# Patient Record
Sex: MALE | Race: WHITE | NOT HISPANIC OR LATINO | Employment: OTHER | URBAN - METROPOLITAN AREA
[De-identification: names, ages, dates, MRNs, and addresses within clinical notes are randomized per-mention and may not be internally consistent; named-entity substitution may affect disease eponyms.]

---

## 2017-01-04 ENCOUNTER — HOSPITAL ENCOUNTER (OUTPATIENT)
Dept: LAB | Facility: MEDICAL CENTER | Age: 72
End: 2017-01-04
Attending: FAMILY MEDICINE
Payer: MEDICARE

## 2017-01-04 LAB
INR PPP: 1.13 (ref 0.87–1.13)
PROTHROMBIN TIME: 14.9 SEC (ref 12–14.6)

## 2017-01-04 PROCEDURE — 36415 COLL VENOUS BLD VENIPUNCTURE: CPT

## 2017-01-04 PROCEDURE — 85610 PROTHROMBIN TIME: CPT

## 2017-01-18 ENCOUNTER — HOSPITAL ENCOUNTER (OUTPATIENT)
Dept: LAB | Facility: MEDICAL CENTER | Age: 72
End: 2017-01-18
Attending: FAMILY MEDICINE
Payer: MEDICARE

## 2017-01-18 LAB
ALBUMIN SERPL BCP-MCNC: 4 G/DL (ref 3.2–4.9)
ALBUMIN/GLOB SERPL: 1.1 G/DL
ALP SERPL-CCNC: 111 U/L (ref 30–99)
ALT SERPL-CCNC: 11 U/L (ref 2–50)
ANION GAP SERPL CALC-SCNC: 7 MMOL/L (ref 0–11.9)
APPEARANCE UR: CLEAR
AST SERPL-CCNC: 17 U/L (ref 12–45)
BASOPHILS # BLD AUTO: 0.04 K/UL (ref 0–0.12)
BASOPHILS NFR BLD AUTO: 0.7 % (ref 0–1.8)
BILIRUB SERPL-MCNC: 0.4 MG/DL (ref 0.1–1.5)
BILIRUB UR QL STRIP.AUTO: NEGATIVE
BUN SERPL-MCNC: 14 MG/DL (ref 8–22)
CALCIUM SERPL-MCNC: 9.3 MG/DL (ref 8.5–10.5)
CHLORIDE SERPL-SCNC: 102 MMOL/L (ref 96–112)
CHOLEST SERPL-MCNC: 140 MG/DL (ref 100–199)
CO2 SERPL-SCNC: 26 MMOL/L (ref 20–33)
COLOR UR AUTO: COLORLESS
CREAT SERPL-MCNC: 0.68 MG/DL (ref 0.5–1.4)
EOSINOPHIL # BLD: 0.3 K/UL (ref 0–0.51)
EOSINOPHIL NFR BLD AUTO: 5 % (ref 0–6.9)
ERYTHROCYTE [DISTWIDTH] IN BLOOD BY AUTOMATED COUNT: 49.8 FL (ref 35.9–50)
FERRITIN SERPL-MCNC: 121.6 NG/ML (ref 22–322)
GLOBULIN SER CALC-MCNC: 3.8 G/DL (ref 1.9–3.5)
GLUCOSE SERPL-MCNC: 87 MG/DL (ref 65–99)
GLUCOSE UR STRIP.AUTO-MCNC: NEGATIVE MG/DL
HCT VFR BLD AUTO: 38.9 % (ref 42–52)
HDLC SERPL-MCNC: 79 MG/DL
HGB BLD-MCNC: 12.5 G/DL (ref 14–18)
IMM GRANULOCYTES # BLD AUTO: 0.02 K/UL (ref 0–0.11)
IMM GRANULOCYTES NFR BLD AUTO: 0.3 % (ref 0–0.9)
INR PPP: 2.07 (ref 0.87–1.13)
IRON SATN MFR SERPL: 10 % (ref 15–55)
IRON SERPL-MCNC: 28 UG/DL (ref 50–180)
KETONES UR STRIP.AUTO-MCNC: NEGATIVE MG/DL
LDLC SERPL CALC-MCNC: 49 MG/DL
LEUKOCYTE ESTERASE UR QL STRIP.AUTO: NEGATIVE
LYMPHOCYTES # BLD: 1.62 K/UL (ref 1–4.8)
LYMPHOCYTES NFR BLD AUTO: 27 % (ref 22–41)
MCH RBC QN AUTO: 30.3 PG (ref 27–33)
MCHC RBC AUTO-ENTMCNC: 32.1 G/DL (ref 33.7–35.3)
MCV RBC AUTO: 94.4 FL (ref 81.4–97.8)
MICRO URNS: NORMAL
MONOCYTES # BLD: 0.7 K/UL (ref 0–0.85)
MONOCYTES NFR BLD AUTO: 11.7 % (ref 0–13.4)
NEUTROPHILS # BLD: 3.32 K/UL (ref 1.82–7.42)
NEUTROPHILS NFR BLD AUTO: 55.3 % (ref 44–72)
NITRITE UR QL STRIP.AUTO: NEGATIVE
NRBC # BLD AUTO: 0 K/UL
NRBC BLD-RTO: 0 /100 WBC
PH UR: 6 [PH]
PLATELET # BLD AUTO: 366 K/UL (ref 164–446)
PMV BLD AUTO: 8.6 FL (ref 9–12.9)
POTASSIUM SERPL-SCNC: 4.5 MMOL/L (ref 3.6–5.5)
PROT SERPL-MCNC: 7.8 G/DL (ref 6–8.2)
PROT UR QL STRIP: NEGATIVE MG/DL
PROTHROMBIN TIME: 23.9 SEC (ref 12–14.6)
PSA SERPL DL<=0.01 NG/ML-MCNC: 0.64 NG/ML (ref 0–4)
RBC # BLD AUTO: 4.12 M/UL (ref 4.7–6.1)
RBC UR QL AUTO: NEGATIVE
SODIUM SERPL-SCNC: 135 MMOL/L (ref 135–145)
SP GR UR STRIP.AUTO: 1
TIBC SERPL-MCNC: 274 UG/DL (ref 250–450)
TRIGL SERPL-MCNC: 59 MG/DL (ref 0–149)
VIT B12 SERPL-MCNC: 172 PG/ML (ref 211–911)
WBC # BLD AUTO: 6 K/UL (ref 4.8–10.8)

## 2017-01-18 PROCEDURE — 84153 ASSAY OF PSA TOTAL: CPT | Mod: GA

## 2017-01-18 PROCEDURE — 80061 LIPID PANEL: CPT | Mod: GA

## 2017-01-18 PROCEDURE — 83550 IRON BINDING TEST: CPT

## 2017-01-18 PROCEDURE — 85610 PROTHROMBIN TIME: CPT

## 2017-01-18 PROCEDURE — 80053 COMPREHEN METABOLIC PANEL: CPT

## 2017-01-18 PROCEDURE — 81003 URINALYSIS AUTO W/O SCOPE: CPT

## 2017-01-18 PROCEDURE — 82607 VITAMIN B-12: CPT

## 2017-01-18 PROCEDURE — 83540 ASSAY OF IRON: CPT

## 2017-01-18 PROCEDURE — 36415 COLL VENOUS BLD VENIPUNCTURE: CPT

## 2017-01-18 PROCEDURE — 85025 COMPLETE CBC W/AUTO DIFF WBC: CPT

## 2017-01-18 PROCEDURE — 82728 ASSAY OF FERRITIN: CPT

## 2017-04-05 ENCOUNTER — HOSPITAL ENCOUNTER (OUTPATIENT)
Dept: HOSPITAL 8 - CVU | Age: 72
End: 2017-04-05
Attending: INTERNAL MEDICINE
Payer: MEDICARE

## 2017-04-05 DIAGNOSIS — I35.0: ICD-10-CM

## 2017-04-05 DIAGNOSIS — I82.813: Primary | ICD-10-CM

## 2017-04-05 PROCEDURE — 93922 UPR/L XTREMITY ART 2 LEVELS: CPT

## 2017-04-13 ENCOUNTER — HOSPITAL ENCOUNTER (OUTPATIENT)
Facility: MEDICAL CENTER | Age: 72
End: 2017-04-13
Attending: FAMILY MEDICINE
Payer: MEDICARE

## 2017-04-13 PROCEDURE — 87070 CULTURE OTHR SPECIMN AEROBIC: CPT

## 2017-04-16 LAB
BACTERIA WND AEROBE CULT: NORMAL
SIGNIFICANT IND 70042: NORMAL
SITE SITE: NORMAL
SOURCE SOURCE: NORMAL

## 2017-04-17 ENCOUNTER — HOSPITAL ENCOUNTER (OUTPATIENT)
Dept: LAB | Facility: MEDICAL CENTER | Age: 72
End: 2017-04-17
Attending: FAMILY MEDICINE
Payer: MEDICARE

## 2017-04-17 LAB
BASOPHILS # BLD AUTO: 0.7 % (ref 0–1.8)
BASOPHILS # BLD: 0.05 K/UL (ref 0–0.12)
EOSINOPHIL # BLD AUTO: 0.29 K/UL (ref 0–0.51)
EOSINOPHIL NFR BLD: 3.9 % (ref 0–6.9)
ERYTHROCYTE [DISTWIDTH] IN BLOOD BY AUTOMATED COUNT: 53 FL (ref 35.9–50)
FERRITIN SERPL-MCNC: 132.5 NG/ML (ref 22–322)
HCT VFR BLD AUTO: 38.8 % (ref 42–52)
HGB BLD-MCNC: 12.5 G/DL (ref 14–18)
IMM GRANULOCYTES # BLD AUTO: 0.03 K/UL (ref 0–0.11)
IMM GRANULOCYTES NFR BLD AUTO: 0.4 % (ref 0–0.9)
INR PPP: 2.53 (ref 0.87–1.13)
IRON SATN MFR SERPL: 11 % (ref 15–55)
IRON SERPL-MCNC: 27 UG/DL (ref 50–180)
LYMPHOCYTES # BLD AUTO: 1.33 K/UL (ref 1–4.8)
LYMPHOCYTES NFR BLD: 17.7 % (ref 22–41)
MCH RBC QN AUTO: 30.9 PG (ref 27–33)
MCHC RBC AUTO-ENTMCNC: 32.2 G/DL (ref 33.7–35.3)
MCV RBC AUTO: 96 FL (ref 81.4–97.8)
MONOCYTES # BLD AUTO: 0.71 K/UL (ref 0–0.85)
MONOCYTES NFR BLD AUTO: 9.4 % (ref 0–13.4)
NEUTROPHILS # BLD AUTO: 5.11 K/UL (ref 1.82–7.42)
NEUTROPHILS NFR BLD: 67.9 % (ref 44–72)
NRBC # BLD AUTO: 0 K/UL
NRBC BLD AUTO-RTO: 0 /100 WBC
PLATELET # BLD AUTO: 324 K/UL (ref 164–446)
PMV BLD AUTO: 9 FL (ref 9–12.9)
PROTHROMBIN TIME: 28 SEC (ref 12–14.6)
RBC # BLD AUTO: 4.04 M/UL (ref 4.7–6.1)
TIBC SERPL-MCNC: 256 UG/DL (ref 250–450)
VIT B12 SERPL-MCNC: 529 PG/ML (ref 211–911)
WBC # BLD AUTO: 7.5 K/UL (ref 4.8–10.8)

## 2017-04-17 PROCEDURE — 36415 COLL VENOUS BLD VENIPUNCTURE: CPT

## 2017-04-17 PROCEDURE — 85610 PROTHROMBIN TIME: CPT

## 2017-05-15 ENCOUNTER — HOSPITAL ENCOUNTER (OUTPATIENT)
Dept: LAB | Facility: MEDICAL CENTER | Age: 72
End: 2017-05-15
Attending: FAMILY MEDICINE
Payer: MEDICARE

## 2017-05-15 LAB
INR PPP: 1.19 (ref 0.87–1.13)
PROTHROMBIN TIME: 15.5 SEC (ref 12–14.6)

## 2017-05-15 PROCEDURE — 85610 PROTHROMBIN TIME: CPT

## 2017-05-15 PROCEDURE — 36415 COLL VENOUS BLD VENIPUNCTURE: CPT

## 2017-06-05 ENCOUNTER — HOSPITAL ENCOUNTER (OUTPATIENT)
Dept: LAB | Facility: MEDICAL CENTER | Age: 72
End: 2017-06-05
Attending: FAMILY MEDICINE
Payer: MEDICARE

## 2017-06-05 LAB
INR PPP: 2.46 (ref 0.87–1.13)
PROTHROMBIN TIME: 27.4 SEC (ref 12–14.6)

## 2017-06-05 PROCEDURE — 36415 COLL VENOUS BLD VENIPUNCTURE: CPT

## 2017-06-05 PROCEDURE — 85610 PROTHROMBIN TIME: CPT

## 2017-06-13 ENCOUNTER — HOSPITAL ENCOUNTER (OUTPATIENT)
Dept: LAB | Facility: MEDICAL CENTER | Age: 72
End: 2017-06-13
Attending: FAMILY MEDICINE
Payer: MEDICARE

## 2017-06-13 LAB
INR PPP: 2.03 (ref 0.87–1.13)
PROTHROMBIN TIME: 23.6 SEC (ref 12–14.6)

## 2017-06-13 PROCEDURE — 36415 COLL VENOUS BLD VENIPUNCTURE: CPT

## 2017-06-13 PROCEDURE — 85610 PROTHROMBIN TIME: CPT

## 2017-07-10 ENCOUNTER — HOSPITAL ENCOUNTER (INPATIENT)
Dept: HOSPITAL 8 - ORIP | Age: 72
LOS: 8 days | Discharge: NURSING, REHAB OR LONG-TERM CARE | DRG: 468 | End: 2017-07-18
Attending: ORTHOPAEDIC SURGERY | Admitting: ORTHOPAEDIC SURGERY
Payer: MEDICARE

## 2017-07-10 VITALS — DIASTOLIC BLOOD PRESSURE: 70 MMHG | SYSTOLIC BLOOD PRESSURE: 116 MMHG

## 2017-07-10 VITALS — WEIGHT: 195.55 LBS | BODY MASS INDEX: 25.92 KG/M2 | HEIGHT: 73 IN

## 2017-07-10 VITALS — SYSTOLIC BLOOD PRESSURE: 130 MMHG | DIASTOLIC BLOOD PRESSURE: 80 MMHG

## 2017-07-10 DIAGNOSIS — B95.7: ICD-10-CM

## 2017-07-10 DIAGNOSIS — I05.0: ICD-10-CM

## 2017-07-10 DIAGNOSIS — Z88.6: ICD-10-CM

## 2017-07-10 DIAGNOSIS — I10: ICD-10-CM

## 2017-07-10 DIAGNOSIS — Z86.718: ICD-10-CM

## 2017-07-10 DIAGNOSIS — T84.54XA: Primary | ICD-10-CM

## 2017-07-10 DIAGNOSIS — Z86.14: ICD-10-CM

## 2017-07-10 DIAGNOSIS — B96.89: ICD-10-CM

## 2017-07-10 DIAGNOSIS — J44.9: ICD-10-CM

## 2017-07-10 DIAGNOSIS — I35.0: ICD-10-CM
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
## 2017-07-10 PROCEDURE — 87176 TISSUE HOMOGENIZATION CULTR: CPT

## 2017-07-10 PROCEDURE — 87077 CULTURE AEROBIC IDENTIFY: CPT

## 2017-07-10 PROCEDURE — 77001 FLUOROGUIDE FOR VEIN DEVICE: CPT

## 2017-07-10 PROCEDURE — 86140 C-REACTIVE PROTEIN: CPT

## 2017-07-10 PROCEDURE — 87070 CULTURE OTHR SPECIMN AEROBIC: CPT

## 2017-07-10 PROCEDURE — 36415 COLL VENOUS BLD VENIPUNCTURE: CPT

## 2017-07-10 PROCEDURE — C1751 CATH, INF, PER/CENT/MIDLINE: HCPCS

## 2017-07-10 PROCEDURE — 80048 BASIC METABOLIC PNL TOTAL CA: CPT

## 2017-07-10 PROCEDURE — 85014 HEMATOCRIT: CPT

## 2017-07-10 PROCEDURE — 0SPD0JZ REMOVAL OF SYNTHETIC SUBSTITUTE FROM LEFT KNEE JOINT, OPEN APPROACH: ICD-10-PCS | Performed by: ORTHOPAEDIC SURGERY

## 2017-07-10 PROCEDURE — 87040 BLOOD CULTURE FOR BACTERIA: CPT

## 2017-07-10 PROCEDURE — 87075 CULTR BACTERIA EXCEPT BLOOD: CPT

## 2017-07-10 PROCEDURE — 85025 COMPLETE CBC W/AUTO DIFF WBC: CPT

## 2017-07-10 PROCEDURE — 85730 THROMBOPLASTIN TIME PARTIAL: CPT

## 2017-07-10 PROCEDURE — 85018 HEMOGLOBIN: CPT

## 2017-07-10 PROCEDURE — C1713 ANCHOR/SCREW BN/BN,TIS/BN: HCPCS

## 2017-07-10 PROCEDURE — 0SRD0J9 REPLACEMENT OF LEFT KNEE JOINT WITH SYNTHETIC SUBSTITUTE, CEMENTED, OPEN APPROACH: ICD-10-PCS | Performed by: ORTHOPAEDIC SURGERY

## 2017-07-10 PROCEDURE — 85610 PROTHROMBIN TIME: CPT

## 2017-07-10 PROCEDURE — 82550 ASSAY OF CK (CPK): CPT

## 2017-07-10 PROCEDURE — 87205 SMEAR GRAM STAIN: CPT

## 2017-07-10 PROCEDURE — 87186 SC STD MICRODIL/AGAR DIL: CPT

## 2017-07-10 PROCEDURE — 80202 ASSAY OF VANCOMYCIN: CPT

## 2017-07-10 PROCEDURE — 82962 GLUCOSE BLOOD TEST: CPT

## 2017-07-10 PROCEDURE — 76937 US GUIDE VASCULAR ACCESS: CPT

## 2017-07-10 PROCEDURE — 80053 COMPREHEN METABOLIC PANEL: CPT

## 2017-07-10 PROCEDURE — 85651 RBC SED RATE NONAUTOMATED: CPT

## 2017-07-10 PROCEDURE — 36569 INSJ PICC 5 YR+ W/O IMAGING: CPT

## 2017-07-10 RX ADMIN — OXYCODONE HYDROCHLORIDE PRN MG: 5 TABLET ORAL at 22:41

## 2017-07-10 RX ADMIN — MORPHINE SULFATE PRN MG: 10 INJECTION INTRAVENOUS at 22:15

## 2017-07-10 RX ADMIN — DIAZEPAM PRN MG: 5 TABLET ORAL at 22:41

## 2017-07-10 RX ADMIN — FENTANYL CITRATE PRN MCG: 50 INJECTION INTRAMUSCULAR; INTRAVENOUS at 20:21

## 2017-07-10 RX ADMIN — FENTANYL CITRATE PRN MCG: 50 INJECTION INTRAMUSCULAR; INTRAVENOUS at 20:59

## 2017-07-10 RX ADMIN — INSULIN LISPRO SCH NOTE: 100 INJECTION, SOLUTION INTRAVENOUS; SUBCUTANEOUS at 23:30

## 2017-07-10 RX ADMIN — SCOPOLAMINE SCH PATCH: 1 PATCH, EXTENDED RELEASE TRANSDERMAL at 22:32

## 2017-07-10 RX ADMIN — FENTANYL CITRATE PRN MCG: 50 INJECTION INTRAMUSCULAR; INTRAVENOUS at 21:16

## 2017-07-10 RX ADMIN — DOCUSATE SODIUM SCH MG: 100 CAPSULE, LIQUID FILLED ORAL at 21:00

## 2017-07-10 RX ADMIN — FENTANYL CITRATE PRN MCG: 50 INJECTION INTRAMUSCULAR; INTRAVENOUS at 20:26

## 2017-07-10 RX ADMIN — ACETAMINOPHEN SCH MG: 160 SOLUTION ORAL at 22:30

## 2017-07-11 VITALS — SYSTOLIC BLOOD PRESSURE: 99 MMHG | DIASTOLIC BLOOD PRESSURE: 61 MMHG

## 2017-07-11 VITALS — DIASTOLIC BLOOD PRESSURE: 80 MMHG | SYSTOLIC BLOOD PRESSURE: 134 MMHG

## 2017-07-11 VITALS — SYSTOLIC BLOOD PRESSURE: 121 MMHG | DIASTOLIC BLOOD PRESSURE: 70 MMHG

## 2017-07-11 VITALS — SYSTOLIC BLOOD PRESSURE: 111 MMHG | DIASTOLIC BLOOD PRESSURE: 65 MMHG

## 2017-07-11 VITALS — DIASTOLIC BLOOD PRESSURE: 75 MMHG | SYSTOLIC BLOOD PRESSURE: 114 MMHG

## 2017-07-11 RX ADMIN — INSULIN LISPRO SCH NOTE: 100 INJECTION, SOLUTION INTRAVENOUS; SUBCUTANEOUS at 06:30

## 2017-07-11 RX ADMIN — MORPHINE SULFATE PRN MG: 10 INJECTION INTRAVENOUS at 05:15

## 2017-07-11 RX ADMIN — BENAZEPRIL HYDROCHLORIDE SCH MG: 20 TABLET, COATED ORAL at 09:22

## 2017-07-11 RX ADMIN — OXYCODONE HYDROCHLORIDE PRN MG: 5 TABLET ORAL at 12:21

## 2017-07-11 RX ADMIN — AMPICILLIN SODIUM AND SULBACTAM SODIUM SCH MLS/HR: 2; 1 INJECTION, POWDER, FOR SOLUTION INTRAMUSCULAR; INTRAVENOUS at 00:47

## 2017-07-11 RX ADMIN — VANCOMYCIN HYDROCHLORIDE SCH MLS/HR: 1 INJECTION, POWDER, LYOPHILIZED, FOR SOLUTION INTRAVENOUS at 02:49

## 2017-07-11 RX ADMIN — AMPICILLIN SODIUM AND SULBACTAM SODIUM SCH MLS/HR: 2; 1 INJECTION, POWDER, FOR SOLUTION INTRAMUSCULAR; INTRAVENOUS at 10:28

## 2017-07-11 RX ADMIN — OXYCODONE HYDROCHLORIDE PRN MG: 5 TABLET ORAL at 02:49

## 2017-07-11 RX ADMIN — WARFARIN SODIUM SCH MG: 7.5 TABLET ORAL at 00:50

## 2017-07-11 RX ADMIN — Medication SCH TAB: at 09:29

## 2017-07-11 RX ADMIN — DEXTROSE, SODIUM CHLORIDE, AND POTASSIUM CHLORIDE SCH MLS/HR: 5; .45; .15 INJECTION INTRAVENOUS at 07:51

## 2017-07-11 RX ADMIN — DEXTROSE, SODIUM CHLORIDE, AND POTASSIUM CHLORIDE SCH MLS/HR: 5; .45; .15 INJECTION INTRAVENOUS at 16:18

## 2017-07-11 RX ADMIN — MEPERIDINE HYDROCHLORIDE PRN MG: 25 INJECTION, SOLUTION INTRAMUSCULAR; INTRAVENOUS; SUBCUTANEOUS at 17:18

## 2017-07-11 RX ADMIN — MEPERIDINE HYDROCHLORIDE PRN MG: 25 INJECTION, SOLUTION INTRAMUSCULAR; INTRAVENOUS; SUBCUTANEOUS at 12:21

## 2017-07-11 RX ADMIN — DEXTROSE, SODIUM CHLORIDE, AND POTASSIUM CHLORIDE SCH MLS/HR: 5; .45; .15 INJECTION INTRAVENOUS at 00:47

## 2017-07-11 RX ADMIN — INSULIN LISPRO SCH NOTE: 100 INJECTION, SOLUTION INTRAVENOUS; SUBCUTANEOUS at 05:30

## 2017-07-11 RX ADMIN — MORPHINE SULFATE PRN MG: 10 INJECTION INTRAVENOUS at 01:07

## 2017-07-11 RX ADMIN — TAMSULOSIN HYDROCHLORIDE SCH MG: 0.4 CAPSULE ORAL at 09:30

## 2017-07-11 RX ADMIN — INSULIN LISPRO SCH NOTE: 100 INJECTION, SOLUTION INTRAVENOUS; SUBCUTANEOUS at 01:30

## 2017-07-11 RX ADMIN — ACETAMINOPHEN SCH MG: 160 SOLUTION ORAL at 22:30

## 2017-07-11 RX ADMIN — AMLODIPINE BESYLATE SCH MG: 5 TABLET ORAL at 09:22

## 2017-07-11 RX ADMIN — INSULIN LISPRO SCH NOTE: 100 INJECTION, SOLUTION INTRAVENOUS; SUBCUTANEOUS at 03:30

## 2017-07-11 RX ADMIN — AMPICILLIN SODIUM AND SULBACTAM SODIUM SCH MLS/HR: 2; 1 INJECTION, POWDER, FOR SOLUTION INTRAMUSCULAR; INTRAVENOUS at 18:29

## 2017-07-11 RX ADMIN — INSULIN LISPRO SCH NOTE: 100 INJECTION, SOLUTION INTRAVENOUS; SUBCUTANEOUS at 04:30

## 2017-07-11 RX ADMIN — DEXTROSE, SODIUM CHLORIDE, AND POTASSIUM CHLORIDE SCH MLS/HR: 5; .45; .15 INJECTION INTRAVENOUS at 00:01

## 2017-07-11 RX ADMIN — OXYCODONE HYDROCHLORIDE PRN MG: 5 TABLET ORAL at 16:29

## 2017-07-11 RX ADMIN — ACETAMINOPHEN SCH MG: 160 SOLUTION ORAL at 01:13

## 2017-07-11 RX ADMIN — ACETAMINOPHEN SCH MG: 160 SOLUTION ORAL at 10:33

## 2017-07-11 RX ADMIN — ALLOPURINOL SCH MG: 300 TABLET ORAL at 09:29

## 2017-07-11 RX ADMIN — ACETAMINOPHEN SCH MG: 160 SOLUTION ORAL at 03:55

## 2017-07-11 RX ADMIN — GABAPENTIN SCH MG: 300 CAPSULE ORAL at 20:06

## 2017-07-11 RX ADMIN — MEPERIDINE HYDROCHLORIDE PRN MG: 25 INJECTION, SOLUTION INTRAMUSCULAR; INTRAVENOUS; SUBCUTANEOUS at 00:05

## 2017-07-11 RX ADMIN — DOCUSATE SODIUM SCH MG: 100 CAPSULE, LIQUID FILLED ORAL at 20:06

## 2017-07-11 RX ADMIN — ACETAMINOPHEN SCH MG: 160 SOLUTION ORAL at 16:19

## 2017-07-11 RX ADMIN — AMPICILLIN SODIUM AND SULBACTAM SODIUM SCH MLS/HR: 2; 1 INJECTION, POWDER, FOR SOLUTION INTRAMUSCULAR; INTRAVENOUS at 00:30

## 2017-07-11 RX ADMIN — WARFARIN SODIUM SCH MG: 7.5 TABLET ORAL at 18:29

## 2017-07-11 RX ADMIN — MORPHINE SULFATE PRN MG: 10 INJECTION INTRAVENOUS at 09:29

## 2017-07-11 RX ADMIN — MEPERIDINE HYDROCHLORIDE PRN MG: 25 INJECTION, SOLUTION INTRAMUSCULAR; INTRAVENOUS; SUBCUTANEOUS at 04:00

## 2017-07-11 RX ADMIN — NICOTINE SCH PATCH: 21 PATCH, EXTENDED RELEASE TRANSDERMAL at 20:06

## 2017-07-11 RX ADMIN — OXYCODONE HYDROCHLORIDE PRN MG: 5 TABLET ORAL at 07:55

## 2017-07-11 RX ADMIN — INSULIN LISPRO SCH NOTE: 100 INJECTION, SOLUTION INTRAVENOUS; SUBCUTANEOUS at 07:30

## 2017-07-11 RX ADMIN — CYCLOBENZAPRINE HYDROCHLORIDE PRN MG: 10 TABLET, FILM COATED ORAL at 01:13

## 2017-07-11 RX ADMIN — VANCOMYCIN HYDROCHLORIDE SCH MLS/HR: 1 INJECTION, POWDER, LYOPHILIZED, FOR SOLUTION INTRAVENOUS at 15:16

## 2017-07-11 RX ADMIN — DOCUSATE SODIUM SCH MG: 100 CAPSULE, LIQUID FILLED ORAL at 09:29

## 2017-07-11 RX ADMIN — GABAPENTIN SCH MG: 300 CAPSULE ORAL at 00:47

## 2017-07-11 RX ADMIN — OXYCODONE HYDROCHLORIDE PRN MG: 5 TABLET ORAL at 20:39

## 2017-07-11 RX ADMIN — INSULIN LISPRO SCH NOTE: 100 INJECTION, SOLUTION INTRAVENOUS; SUBCUTANEOUS at 02:30

## 2017-07-11 RX ADMIN — INSULIN LISPRO SCH NOTE: 100 INJECTION, SOLUTION INTRAVENOUS; SUBCUTANEOUS at 00:30

## 2017-07-12 VITALS — DIASTOLIC BLOOD PRESSURE: 65 MMHG | SYSTOLIC BLOOD PRESSURE: 123 MMHG

## 2017-07-12 VITALS — SYSTOLIC BLOOD PRESSURE: 125 MMHG | DIASTOLIC BLOOD PRESSURE: 75 MMHG

## 2017-07-12 VITALS — DIASTOLIC BLOOD PRESSURE: 62 MMHG | SYSTOLIC BLOOD PRESSURE: 109 MMHG

## 2017-07-12 VITALS — SYSTOLIC BLOOD PRESSURE: 133 MMHG | DIASTOLIC BLOOD PRESSURE: 76 MMHG

## 2017-07-12 LAB — BUN SERPL-MCNC: 10 MG/DL (ref 7–18)

## 2017-07-12 RX ADMIN — DOCUSATE SODIUM SCH MG: 100 CAPSULE, LIQUID FILLED ORAL at 19:59

## 2017-07-12 RX ADMIN — AMPICILLIN SODIUM AND SULBACTAM SODIUM SCH MLS/HR: 2; 1 INJECTION, POWDER, FOR SOLUTION INTRAMUSCULAR; INTRAVENOUS at 02:44

## 2017-07-12 RX ADMIN — ACETAMINOPHEN SCH MG: 160 SOLUTION ORAL at 04:30

## 2017-07-12 RX ADMIN — VANCOMYCIN HYDROCHLORIDE SCH MLS/HR: 1 INJECTION, POWDER, LYOPHILIZED, FOR SOLUTION INTRAVENOUS at 17:26

## 2017-07-12 RX ADMIN — NICOTINE SCH PATCH: 21 PATCH, EXTENDED RELEASE TRANSDERMAL at 19:59

## 2017-07-12 RX ADMIN — OXYCODONE HYDROCHLORIDE PRN MG: 5 TABLET ORAL at 17:46

## 2017-07-12 RX ADMIN — BENAZEPRIL HYDROCHLORIDE SCH MG: 20 TABLET, COATED ORAL at 09:40

## 2017-07-12 RX ADMIN — AMLODIPINE BESYLATE SCH MG: 5 TABLET ORAL at 09:40

## 2017-07-12 RX ADMIN — AMPICILLIN SODIUM AND SULBACTAM SODIUM SCH MLS/HR: 2; 1 INJECTION, POWDER, FOR SOLUTION INTRAMUSCULAR; INTRAVENOUS at 19:59

## 2017-07-12 RX ADMIN — OXYCODONE HYDROCHLORIDE PRN MG: 5 TABLET ORAL at 09:40

## 2017-07-12 RX ADMIN — GABAPENTIN SCH MG: 300 CAPSULE ORAL at 19:59

## 2017-07-12 RX ADMIN — GABAPENTIN SCH MG: 300 CAPSULE ORAL at 09:40

## 2017-07-12 RX ADMIN — OXYCODONE HYDROCHLORIDE PRN MG: 5 TABLET ORAL at 00:59

## 2017-07-12 RX ADMIN — DOCUSATE SODIUM SCH MG: 100 CAPSULE, LIQUID FILLED ORAL at 09:40

## 2017-07-12 RX ADMIN — ACETAMINOPHEN SCH MG: 160 SOLUTION ORAL at 09:39

## 2017-07-12 RX ADMIN — ALLOPURINOL SCH MG: 300 TABLET ORAL at 09:41

## 2017-07-12 RX ADMIN — DEXTROSE, SODIUM CHLORIDE, AND POTASSIUM CHLORIDE SCH MLS/HR: 5; .45; .15 INJECTION INTRAVENOUS at 00:01

## 2017-07-12 RX ADMIN — AMPICILLIN SODIUM AND SULBACTAM SODIUM SCH MLS/HR: 2; 1 INJECTION, POWDER, FOR SOLUTION INTRAMUSCULAR; INTRAVENOUS at 11:02

## 2017-07-12 RX ADMIN — VANCOMYCIN HYDROCHLORIDE SCH MLS/HR: 1 INJECTION, POWDER, LYOPHILIZED, FOR SOLUTION INTRAVENOUS at 04:10

## 2017-07-12 RX ADMIN — OXYCODONE HYDROCHLORIDE PRN MG: 5 TABLET ORAL at 21:47

## 2017-07-12 RX ADMIN — DEXTROSE, SODIUM CHLORIDE, AND POTASSIUM CHLORIDE SCH MLS/HR: 5; .45; .15 INJECTION INTRAVENOUS at 17:27

## 2017-07-12 RX ADMIN — WARFARIN SODIUM SCH MG: 7.5 TABLET ORAL at 17:46

## 2017-07-12 RX ADMIN — OXYCODONE HYDROCHLORIDE PRN MG: 5 TABLET ORAL at 13:55

## 2017-07-12 RX ADMIN — DEXTROSE, SODIUM CHLORIDE, AND POTASSIUM CHLORIDE SCH MLS/HR: 5; .45; .15 INJECTION INTRAVENOUS at 05:00

## 2017-07-12 RX ADMIN — TAMSULOSIN HYDROCHLORIDE SCH MG: 0.4 CAPSULE ORAL at 09:41

## 2017-07-12 RX ADMIN — Medication SCH TAB: at 09:41

## 2017-07-12 RX ADMIN — ACETAMINOPHEN SCH MG: 160 SOLUTION ORAL at 17:46

## 2017-07-12 RX ADMIN — OXYCODONE HYDROCHLORIDE PRN MG: 5 TABLET ORAL at 05:07

## 2017-07-13 VITALS — DIASTOLIC BLOOD PRESSURE: 64 MMHG | SYSTOLIC BLOOD PRESSURE: 102 MMHG

## 2017-07-13 VITALS — DIASTOLIC BLOOD PRESSURE: 61 MMHG | SYSTOLIC BLOOD PRESSURE: 101 MMHG

## 2017-07-13 VITALS — DIASTOLIC BLOOD PRESSURE: 67 MMHG | SYSTOLIC BLOOD PRESSURE: 117 MMHG

## 2017-07-13 VITALS — DIASTOLIC BLOOD PRESSURE: 69 MMHG | SYSTOLIC BLOOD PRESSURE: 120 MMHG

## 2017-07-13 RX ADMIN — ACETAMINOPHEN SCH MG: 160 SOLUTION ORAL at 23:41

## 2017-07-13 RX ADMIN — ALLOPURINOL SCH MG: 300 TABLET ORAL at 10:33

## 2017-07-13 RX ADMIN — AMPICILLIN SODIUM AND SULBACTAM SODIUM SCH MLS/HR: 2; 1 INJECTION, POWDER, FOR SOLUTION INTRAMUSCULAR; INTRAVENOUS at 11:31

## 2017-07-13 RX ADMIN — OXYCODONE HYDROCHLORIDE PRN MG: 5 TABLET ORAL at 06:18

## 2017-07-13 RX ADMIN — ACETAMINOPHEN SCH MG: 160 SOLUTION ORAL at 16:30

## 2017-07-13 RX ADMIN — GABAPENTIN SCH MG: 300 CAPSULE ORAL at 20:07

## 2017-07-13 RX ADMIN — DOCUSATE SODIUM SCH MG: 100 CAPSULE, LIQUID FILLED ORAL at 20:07

## 2017-07-13 RX ADMIN — OXYCODONE HYDROCHLORIDE PRN MG: 5 TABLET ORAL at 02:06

## 2017-07-13 RX ADMIN — Medication SCH TAB: at 10:32

## 2017-07-13 RX ADMIN — DIAZEPAM PRN MG: 5 TABLET ORAL at 10:32

## 2017-07-13 RX ADMIN — DEXTROSE, SODIUM CHLORIDE, AND POTASSIUM CHLORIDE SCH MLS/HR: 5; .45; .15 INJECTION INTRAVENOUS at 20:17

## 2017-07-13 RX ADMIN — BENAZEPRIL HYDROCHLORIDE SCH MG: 20 TABLET, COATED ORAL at 10:09

## 2017-07-13 RX ADMIN — AMPICILLIN SODIUM AND SULBACTAM SODIUM SCH MLS/HR: 2; 1 INJECTION, POWDER, FOR SOLUTION INTRAMUSCULAR; INTRAVENOUS at 03:28

## 2017-07-13 RX ADMIN — ACETAMINOPHEN SCH MG: 160 SOLUTION ORAL at 11:46

## 2017-07-13 RX ADMIN — NICOTINE SCH PATCH: 21 PATCH, EXTENDED RELEASE TRANSDERMAL at 20:18

## 2017-07-13 RX ADMIN — DEXTROSE, SODIUM CHLORIDE, AND POTASSIUM CHLORIDE SCH MLS/HR: 5; .45; .15 INJECTION INTRAVENOUS at 16:30

## 2017-07-13 RX ADMIN — VANCOMYCIN HYDROCHLORIDE SCH MLS/HR: 1 INJECTION, POWDER, LYOPHILIZED, FOR SOLUTION INTRAVENOUS at 04:41

## 2017-07-13 RX ADMIN — DOCUSATE SODIUM SCH MG: 100 CAPSULE, LIQUID FILLED ORAL at 10:32

## 2017-07-13 RX ADMIN — ACETAMINOPHEN SCH MG: 160 SOLUTION ORAL at 00:26

## 2017-07-13 RX ADMIN — OXYCODONE HYDROCHLORIDE PRN MG: 5 TABLET ORAL at 20:07

## 2017-07-13 RX ADMIN — OXYCODONE HYDROCHLORIDE PRN MG: 5 TABLET ORAL at 10:32

## 2017-07-13 RX ADMIN — NICOTINE SCH PATCH: 21 PATCH, EXTENDED RELEASE TRANSDERMAL at 20:13

## 2017-07-13 RX ADMIN — OXYCODONE HYDROCHLORIDE PRN MG: 5 TABLET ORAL at 14:57

## 2017-07-13 RX ADMIN — AMPICILLIN SODIUM AND SULBACTAM SODIUM SCH MLS/HR: 2; 1 INJECTION, POWDER, FOR SOLUTION INTRAMUSCULAR; INTRAVENOUS at 20:07

## 2017-07-13 RX ADMIN — WARFARIN SODIUM SCH MG: 7.5 TABLET ORAL at 17:53

## 2017-07-13 RX ADMIN — GABAPENTIN SCH MG: 300 CAPSULE ORAL at 10:33

## 2017-07-13 RX ADMIN — DEXTROSE, SODIUM CHLORIDE, AND POTASSIUM CHLORIDE SCH MLS/HR: 5; .45; .15 INJECTION INTRAVENOUS at 00:01

## 2017-07-13 RX ADMIN — VANCOMYCIN HYDROCHLORIDE SCH MLS/HR: 1 INJECTION, POWDER, LYOPHILIZED, FOR SOLUTION INTRAVENOUS at 14:57

## 2017-07-13 RX ADMIN — SCOPOLAMINE SCH PATCH: 1 PATCH, EXTENDED RELEASE TRANSDERMAL at 16:30

## 2017-07-13 RX ADMIN — ACETAMINOPHEN SCH MG: 160 SOLUTION ORAL at 06:18

## 2017-07-13 RX ADMIN — DEXTROSE, SODIUM CHLORIDE, AND POTASSIUM CHLORIDE SCH MLS/HR: 5; .45; .15 INJECTION INTRAVENOUS at 07:33

## 2017-07-13 RX ADMIN — AMLODIPINE BESYLATE SCH MG: 5 TABLET ORAL at 09:00

## 2017-07-13 RX ADMIN — TAMSULOSIN HYDROCHLORIDE SCH MG: 0.4 CAPSULE ORAL at 10:33

## 2017-07-14 VITALS — DIASTOLIC BLOOD PRESSURE: 70 MMHG | SYSTOLIC BLOOD PRESSURE: 120 MMHG

## 2017-07-14 VITALS — SYSTOLIC BLOOD PRESSURE: 107 MMHG | DIASTOLIC BLOOD PRESSURE: 64 MMHG

## 2017-07-14 VITALS — SYSTOLIC BLOOD PRESSURE: 104 MMHG | DIASTOLIC BLOOD PRESSURE: 60 MMHG

## 2017-07-14 VITALS — DIASTOLIC BLOOD PRESSURE: 72 MMHG | SYSTOLIC BLOOD PRESSURE: 113 MMHG

## 2017-07-14 RX ADMIN — ACETAMINOPHEN SCH MG: 160 SOLUTION ORAL at 05:21

## 2017-07-14 RX ADMIN — WARFARIN SODIUM SCH MG: 7.5 TABLET ORAL at 17:38

## 2017-07-14 RX ADMIN — DOCUSATE SODIUM SCH MG: 100 CAPSULE, LIQUID FILLED ORAL at 08:08

## 2017-07-14 RX ADMIN — OXYCODONE HYDROCHLORIDE PRN MG: 5 TABLET ORAL at 04:08

## 2017-07-14 RX ADMIN — GABAPENTIN SCH MG: 300 CAPSULE ORAL at 20:33

## 2017-07-14 RX ADMIN — TAMSULOSIN HYDROCHLORIDE SCH MG: 0.4 CAPSULE ORAL at 08:08

## 2017-07-14 RX ADMIN — OXYCODONE HYDROCHLORIDE PRN MG: 5 TABLET ORAL at 08:09

## 2017-07-14 RX ADMIN — ALLOPURINOL SCH MG: 300 TABLET ORAL at 08:09

## 2017-07-14 RX ADMIN — NICOTINE SCH PATCH: 21 PATCH, EXTENDED RELEASE TRANSDERMAL at 12:52

## 2017-07-14 RX ADMIN — CYCLOBENZAPRINE HYDROCHLORIDE PRN MG: 10 TABLET, FILM COATED ORAL at 00:20

## 2017-07-14 RX ADMIN — VANCOMYCIN HYDROCHLORIDE SCH MLS/HR: 1 INJECTION, POWDER, LYOPHILIZED, FOR SOLUTION INTRAVENOUS at 15:18

## 2017-07-14 RX ADMIN — AMPICILLIN SODIUM AND SULBACTAM SODIUM SCH MLS/HR: 2; 1 INJECTION, POWDER, FOR SOLUTION INTRAMUSCULAR; INTRAVENOUS at 03:06

## 2017-07-14 RX ADMIN — AMLODIPINE BESYLATE SCH MG: 5 TABLET ORAL at 08:09

## 2017-07-14 RX ADMIN — OXYCODONE HYDROCHLORIDE PRN MG: 5 TABLET ORAL at 00:16

## 2017-07-14 RX ADMIN — AMPICILLIN SODIUM AND SULBACTAM SODIUM SCH MLS/HR: 2; 1 INJECTION, POWDER, FOR SOLUTION INTRAMUSCULAR; INTRAVENOUS at 11:09

## 2017-07-14 RX ADMIN — OXYCODONE HYDROCHLORIDE PRN MG: 5 TABLET ORAL at 21:53

## 2017-07-14 RX ADMIN — AMPICILLIN SODIUM AND SULBACTAM SODIUM SCH MLS/HR: 2; 1 INJECTION, POWDER, FOR SOLUTION INTRAMUSCULAR; INTRAVENOUS at 20:33

## 2017-07-14 RX ADMIN — ACETAMINOPHEN SCH MG: 160 SOLUTION ORAL at 12:52

## 2017-07-14 RX ADMIN — OXYCODONE HYDROCHLORIDE PRN MG: 5 TABLET ORAL at 12:52

## 2017-07-14 RX ADMIN — DIAZEPAM PRN MG: 5 TABLET ORAL at 00:16

## 2017-07-14 RX ADMIN — BENAZEPRIL HYDROCHLORIDE SCH MG: 20 TABLET, COATED ORAL at 08:09

## 2017-07-14 RX ADMIN — Medication SCH TAB: at 08:09

## 2017-07-14 RX ADMIN — ACETAMINOPHEN SCH MG: 160 SOLUTION ORAL at 18:43

## 2017-07-14 RX ADMIN — GABAPENTIN SCH MG: 300 CAPSULE ORAL at 08:09

## 2017-07-14 RX ADMIN — DOCUSATE SODIUM SCH MG: 100 CAPSULE, LIQUID FILLED ORAL at 20:33

## 2017-07-14 RX ADMIN — VANCOMYCIN HYDROCHLORIDE SCH MLS/HR: 1 INJECTION, POWDER, LYOPHILIZED, FOR SOLUTION INTRAVENOUS at 03:47

## 2017-07-14 RX ADMIN — OXYCODONE HYDROCHLORIDE PRN MG: 5 TABLET ORAL at 17:37

## 2017-07-15 VITALS — SYSTOLIC BLOOD PRESSURE: 108 MMHG | DIASTOLIC BLOOD PRESSURE: 61 MMHG

## 2017-07-15 VITALS — SYSTOLIC BLOOD PRESSURE: 127 MMHG | DIASTOLIC BLOOD PRESSURE: 72 MMHG

## 2017-07-15 VITALS — SYSTOLIC BLOOD PRESSURE: 119 MMHG | DIASTOLIC BLOOD PRESSURE: 69 MMHG

## 2017-07-15 VITALS — SYSTOLIC BLOOD PRESSURE: 126 MMHG | DIASTOLIC BLOOD PRESSURE: 73 MMHG

## 2017-07-15 PROCEDURE — B548ZZA ULTRASONOGRAPHY OF SUPERIOR VENA CAVA, GUIDANCE: ICD-10-PCS | Performed by: RADIOLOGY

## 2017-07-15 PROCEDURE — 02HV33Z INSERTION OF INFUSION DEVICE INTO SUPERIOR VENA CAVA, PERCUTANEOUS APPROACH: ICD-10-PCS | Performed by: RADIOLOGY

## 2017-07-15 PROCEDURE — B5181ZA FLUOROSCOPY OF SUPERIOR VENA CAVA USING LOW OSMOLAR CONTRAST, GUIDANCE: ICD-10-PCS | Performed by: RADIOLOGY

## 2017-07-15 RX ADMIN — OXYCODONE HYDROCHLORIDE PRN MG: 5 TABLET ORAL at 06:15

## 2017-07-15 RX ADMIN — WARFARIN SODIUM SCH MG: 7.5 TABLET ORAL at 17:34

## 2017-07-15 RX ADMIN — TAMSULOSIN HYDROCHLORIDE SCH MG: 0.4 CAPSULE ORAL at 10:13

## 2017-07-15 RX ADMIN — OXYCODONE HYDROCHLORIDE PRN MG: 5 TABLET ORAL at 02:01

## 2017-07-15 RX ADMIN — BENAZEPRIL HYDROCHLORIDE SCH MG: 20 TABLET, COATED ORAL at 10:13

## 2017-07-15 RX ADMIN — OXYCODONE HYDROCHLORIDE PRN MG: 5 TABLET ORAL at 22:57

## 2017-07-15 RX ADMIN — ALLOPURINOL SCH MG: 300 TABLET ORAL at 10:13

## 2017-07-15 RX ADMIN — DAPTOMYCIN SCH MLS/HR: 500 INJECTION, POWDER, LYOPHILIZED, FOR SOLUTION INTRAVENOUS at 14:29

## 2017-07-15 RX ADMIN — ACETAMINOPHEN SCH MG: 160 SOLUTION ORAL at 02:00

## 2017-07-15 RX ADMIN — NICOTINE SCH PATCH: 21 PATCH, EXTENDED RELEASE TRANSDERMAL at 14:29

## 2017-07-15 RX ADMIN — VANCOMYCIN HYDROCHLORIDE SCH MLS/HR: 1 INJECTION, POWDER, LYOPHILIZED, FOR SOLUTION INTRAVENOUS at 03:30

## 2017-07-15 RX ADMIN — ACETAMINOPHEN SCH MG: 160 SOLUTION ORAL at 17:34

## 2017-07-15 RX ADMIN — DOCUSATE SODIUM SCH MG: 100 CAPSULE, LIQUID FILLED ORAL at 10:12

## 2017-07-15 RX ADMIN — Medication SCH TAB: at 10:13

## 2017-07-15 RX ADMIN — AMPICILLIN SODIUM AND SULBACTAM SODIUM SCH MLS/HR: 2; 1 INJECTION, POWDER, FOR SOLUTION INTRAMUSCULAR; INTRAVENOUS at 10:11

## 2017-07-15 RX ADMIN — GABAPENTIN SCH MG: 300 CAPSULE ORAL at 22:02

## 2017-07-15 RX ADMIN — ACETAMINOPHEN SCH MG: 160 SOLUTION ORAL at 14:00

## 2017-07-15 RX ADMIN — OXYCODONE HYDROCHLORIDE PRN MG: 5 TABLET ORAL at 18:25

## 2017-07-15 RX ADMIN — AMLODIPINE BESYLATE SCH MG: 5 TABLET ORAL at 10:13

## 2017-07-15 RX ADMIN — ACETAMINOPHEN SCH MG: 160 SOLUTION ORAL at 08:00

## 2017-07-15 RX ADMIN — AMPICILLIN SODIUM AND SULBACTAM SODIUM SCH MLS/HR: 2; 1 INJECTION, POWDER, FOR SOLUTION INTRAMUSCULAR; INTRAVENOUS at 02:00

## 2017-07-15 RX ADMIN — OXYCODONE HYDROCHLORIDE PRN MG: 5 TABLET ORAL at 10:13

## 2017-07-15 RX ADMIN — GABAPENTIN SCH MG: 300 CAPSULE ORAL at 10:13

## 2017-07-15 RX ADMIN — DOCUSATE SODIUM SCH MG: 100 CAPSULE, LIQUID FILLED ORAL at 22:02

## 2017-07-15 RX ADMIN — OXYCODONE HYDROCHLORIDE PRN MG: 5 TABLET ORAL at 14:29

## 2017-07-16 VITALS — SYSTOLIC BLOOD PRESSURE: 111 MMHG | DIASTOLIC BLOOD PRESSURE: 56 MMHG

## 2017-07-16 VITALS — SYSTOLIC BLOOD PRESSURE: 105 MMHG | DIASTOLIC BLOOD PRESSURE: 64 MMHG

## 2017-07-16 VITALS — SYSTOLIC BLOOD PRESSURE: 118 MMHG | DIASTOLIC BLOOD PRESSURE: 73 MMHG

## 2017-07-16 RX ADMIN — OXYCODONE HYDROCHLORIDE PRN MG: 5 TABLET ORAL at 06:59

## 2017-07-16 RX ADMIN — ACETAMINOPHEN SCH MG: 160 SOLUTION ORAL at 14:37

## 2017-07-16 RX ADMIN — GABAPENTIN SCH MG: 300 CAPSULE ORAL at 20:04

## 2017-07-16 RX ADMIN — BENAZEPRIL HYDROCHLORIDE SCH MG: 20 TABLET, COATED ORAL at 08:13

## 2017-07-16 RX ADMIN — GABAPENTIN SCH MG: 300 CAPSULE ORAL at 08:13

## 2017-07-16 RX ADMIN — OXYCODONE HYDROCHLORIDE PRN MG: 5 TABLET ORAL at 15:54

## 2017-07-16 RX ADMIN — DAPTOMYCIN SCH MLS/HR: 500 INJECTION, POWDER, LYOPHILIZED, FOR SOLUTION INTRAVENOUS at 14:37

## 2017-07-16 RX ADMIN — TAMSULOSIN HYDROCHLORIDE SCH MG: 0.4 CAPSULE ORAL at 08:12

## 2017-07-16 RX ADMIN — OXYCODONE HYDROCHLORIDE PRN MG: 5 TABLET ORAL at 03:02

## 2017-07-16 RX ADMIN — SCOPOLAMINE SCH PATCH: 1 PATCH, EXTENDED RELEASE TRANSDERMAL at 14:38

## 2017-07-16 RX ADMIN — OXYCODONE HYDROCHLORIDE PRN MG: 5 TABLET ORAL at 11:18

## 2017-07-16 RX ADMIN — ACETAMINOPHEN SCH MG: 160 SOLUTION ORAL at 06:59

## 2017-07-16 RX ADMIN — ACETAMINOPHEN SCH MG: 160 SOLUTION ORAL at 01:03

## 2017-07-16 RX ADMIN — BENAZEPRIL HYDROCHLORIDE SCH MG: 20 TABLET, COATED ORAL at 08:15

## 2017-07-16 RX ADMIN — NICOTINE SCH PATCH: 21 PATCH, EXTENDED RELEASE TRANSDERMAL at 14:37

## 2017-07-16 RX ADMIN — DOCUSATE SODIUM SCH MG: 100 CAPSULE, LIQUID FILLED ORAL at 08:12

## 2017-07-16 RX ADMIN — ALLOPURINOL SCH MG: 300 TABLET ORAL at 08:13

## 2017-07-16 RX ADMIN — DOCUSATE SODIUM SCH MG: 100 CAPSULE, LIQUID FILLED ORAL at 20:04

## 2017-07-16 RX ADMIN — AMLODIPINE BESYLATE SCH MG: 5 TABLET ORAL at 08:13

## 2017-07-16 RX ADMIN — WARFARIN SODIUM SCH MG: 7.5 TABLET ORAL at 17:36

## 2017-07-16 RX ADMIN — ACETAMINOPHEN SCH MG: 160 SOLUTION ORAL at 20:04

## 2017-07-16 RX ADMIN — Medication SCH TAB: at 08:13

## 2017-07-16 RX ADMIN — OXYCODONE HYDROCHLORIDE PRN MG: 5 TABLET ORAL at 20:04

## 2017-07-17 VITALS — DIASTOLIC BLOOD PRESSURE: 60 MMHG | SYSTOLIC BLOOD PRESSURE: 105 MMHG

## 2017-07-17 VITALS — SYSTOLIC BLOOD PRESSURE: 106 MMHG | DIASTOLIC BLOOD PRESSURE: 62 MMHG

## 2017-07-17 VITALS — SYSTOLIC BLOOD PRESSURE: 108 MMHG | DIASTOLIC BLOOD PRESSURE: 61 MMHG

## 2017-07-17 VITALS — DIASTOLIC BLOOD PRESSURE: 58 MMHG | SYSTOLIC BLOOD PRESSURE: 104 MMHG

## 2017-07-17 LAB
AST SERPL-CCNC: 11 U/L (ref 15–37)
BUN SERPL-MCNC: 9 MG/DL (ref 7–18)
CRP SERPL-MCNC: 18 MG/DL (ref 0.02–0.49)

## 2017-07-17 RX ADMIN — OXYCODONE HYDROCHLORIDE PRN MG: 5 TABLET ORAL at 09:17

## 2017-07-17 RX ADMIN — ACETAMINOPHEN SCH MG: 160 SOLUTION ORAL at 02:08

## 2017-07-17 RX ADMIN — OXYCODONE HYDROCHLORIDE PRN MG: 5 TABLET ORAL at 00:22

## 2017-07-17 RX ADMIN — ACETAMINOPHEN SCH MG: 160 SOLUTION ORAL at 21:00

## 2017-07-17 RX ADMIN — DOCUSATE SODIUM SCH MG: 100 CAPSULE, LIQUID FILLED ORAL at 09:18

## 2017-07-17 RX ADMIN — OXYCODONE HYDROCHLORIDE PRN MG: 5 TABLET ORAL at 17:36

## 2017-07-17 RX ADMIN — ACETAMINOPHEN SCH MG: 160 SOLUTION ORAL at 09:18

## 2017-07-17 RX ADMIN — AMLODIPINE BESYLATE SCH MG: 5 TABLET ORAL at 09:07

## 2017-07-17 RX ADMIN — DAPTOMYCIN SCH MLS/HR: 500 INJECTION, POWDER, LYOPHILIZED, FOR SOLUTION INTRAVENOUS at 16:01

## 2017-07-17 RX ADMIN — Medication SCH TAB: at 09:18

## 2017-07-17 RX ADMIN — TAMSULOSIN HYDROCHLORIDE SCH MG: 0.4 CAPSULE ORAL at 09:17

## 2017-07-17 RX ADMIN — OXYCODONE HYDROCHLORIDE PRN MG: 5 TABLET ORAL at 21:38

## 2017-07-17 RX ADMIN — NICOTINE SCH PATCH: 21 PATCH, EXTENDED RELEASE TRANSDERMAL at 14:32

## 2017-07-17 RX ADMIN — ACETAMINOPHEN SCH MG: 160 SOLUTION ORAL at 14:49

## 2017-07-17 RX ADMIN — BENAZEPRIL HYDROCHLORIDE SCH MG: 20 TABLET, COATED ORAL at 09:07

## 2017-07-17 RX ADMIN — OXYCODONE HYDROCHLORIDE PRN MG: 5 TABLET ORAL at 13:29

## 2017-07-17 RX ADMIN — ALLOPURINOL SCH MG: 300 TABLET ORAL at 09:18

## 2017-07-17 RX ADMIN — DOCUSATE SODIUM SCH MG: 100 CAPSULE, LIQUID FILLED ORAL at 21:38

## 2017-07-17 RX ADMIN — GABAPENTIN SCH MG: 300 CAPSULE ORAL at 09:18

## 2017-07-17 RX ADMIN — OXYCODONE HYDROCHLORIDE PRN MG: 5 TABLET ORAL at 04:52

## 2017-07-17 RX ADMIN — Medication SCH NOTE: at 09:06

## 2017-07-17 RX ADMIN — GABAPENTIN SCH MG: 300 CAPSULE ORAL at 21:38

## 2017-07-18 VITALS — DIASTOLIC BLOOD PRESSURE: 63 MMHG | SYSTOLIC BLOOD PRESSURE: 106 MMHG

## 2017-07-18 VITALS — SYSTOLIC BLOOD PRESSURE: 106 MMHG | DIASTOLIC BLOOD PRESSURE: 66 MMHG

## 2017-07-18 VITALS — DIASTOLIC BLOOD PRESSURE: 64 MMHG | SYSTOLIC BLOOD PRESSURE: 104 MMHG

## 2017-07-18 RX ADMIN — OXYCODONE HYDROCHLORIDE PRN MG: 5 TABLET ORAL at 14:29

## 2017-07-18 RX ADMIN — ACETAMINOPHEN SCH MG: 160 SOLUTION ORAL at 15:17

## 2017-07-18 RX ADMIN — GABAPENTIN SCH MG: 300 CAPSULE ORAL at 10:46

## 2017-07-18 RX ADMIN — TAMSULOSIN HYDROCHLORIDE SCH MG: 0.4 CAPSULE ORAL at 10:46

## 2017-07-18 RX ADMIN — ACETAMINOPHEN SCH MG: 160 SOLUTION ORAL at 03:00

## 2017-07-18 RX ADMIN — Medication SCH NOTE: at 10:39

## 2017-07-18 RX ADMIN — NICOTINE SCH PATCH: 21 PATCH, EXTENDED RELEASE TRANSDERMAL at 14:54

## 2017-07-18 RX ADMIN — OXYCODONE HYDROCHLORIDE PRN MG: 5 TABLET ORAL at 01:46

## 2017-07-18 RX ADMIN — ALLOPURINOL SCH MG: 300 TABLET ORAL at 10:46

## 2017-07-18 RX ADMIN — DOCUSATE SODIUM SCH MG: 100 CAPSULE, LIQUID FILLED ORAL at 10:46

## 2017-07-18 RX ADMIN — OXYCODONE HYDROCHLORIDE PRN MG: 5 TABLET ORAL at 09:56

## 2017-07-18 RX ADMIN — DAPTOMYCIN SCH MLS/HR: 500 INJECTION, POWDER, LYOPHILIZED, FOR SOLUTION INTRAVENOUS at 14:54

## 2017-07-18 RX ADMIN — AMLODIPINE BESYLATE SCH MG: 5 TABLET ORAL at 10:38

## 2017-07-18 RX ADMIN — ACETAMINOPHEN SCH MG: 160 SOLUTION ORAL at 10:39

## 2017-07-18 RX ADMIN — BENAZEPRIL HYDROCHLORIDE SCH MG: 20 TABLET, COATED ORAL at 10:38

## 2017-07-18 RX ADMIN — Medication SCH TAB: at 10:46

## 2017-09-12 ENCOUNTER — HOSPITAL ENCOUNTER (OUTPATIENT)
Dept: LAB | Facility: MEDICAL CENTER | Age: 72
End: 2017-09-12
Attending: FAMILY MEDICINE
Payer: MEDICARE

## 2017-09-12 LAB
INR PPP: 1.3 (ref 0.87–1.13)
PROTHROMBIN TIME: 16.6 SEC (ref 12–14.6)

## 2017-09-12 PROCEDURE — 85610 PROTHROMBIN TIME: CPT

## 2017-09-12 PROCEDURE — 36415 COLL VENOUS BLD VENIPUNCTURE: CPT

## 2017-09-19 ENCOUNTER — HOSPITAL ENCOUNTER (OUTPATIENT)
Dept: LAB | Facility: MEDICAL CENTER | Age: 72
End: 2017-09-19
Attending: FAMILY MEDICINE
Payer: MEDICARE

## 2017-09-19 LAB
INR PPP: 2.05 (ref 0.87–1.13)
PROTHROMBIN TIME: 23.8 SEC (ref 12–14.6)

## 2017-09-19 PROCEDURE — 85610 PROTHROMBIN TIME: CPT

## 2017-09-19 PROCEDURE — 36415 COLL VENOUS BLD VENIPUNCTURE: CPT

## 2017-11-15 ENCOUNTER — TELEPHONE (OUTPATIENT)
Dept: VASCULAR LAB | Facility: MEDICAL CENTER | Age: 72
End: 2017-11-15

## 2017-11-15 NOTE — TELEPHONE ENCOUNTER
[facility]     for pt to call clinic and establish care.    Sina Alcantara, PharmD

## 2017-12-04 ENCOUNTER — ANTICOAGULATION VISIT (OUTPATIENT)
Dept: MEDICAL GROUP | Facility: PHYSICIAN GROUP | Age: 72
End: 2017-12-04
Payer: MEDICARE

## 2017-12-04 VITALS
DIASTOLIC BLOOD PRESSURE: 56 MMHG | BODY MASS INDEX: 24.73 KG/M2 | SYSTOLIC BLOOD PRESSURE: 112 MMHG | HEART RATE: 132 BPM | WEIGHT: 185 LBS

## 2017-12-04 DIAGNOSIS — I48.91 ATRIAL FIBRILLATION, UNSPECIFIED TYPE (HCC): ICD-10-CM

## 2017-12-04 PROBLEM — I26.99 PULMONARY EMBOLISM (HCC): Status: ACTIVE | Noted: 2017-12-04

## 2017-12-04 PROBLEM — I82.409 DEEP VEIN THROMBOSIS (HCC): Status: ACTIVE | Noted: 2017-12-04

## 2017-12-04 LAB — INR PPP: 6.7 (ref 2–3.5)

## 2017-12-04 PROCEDURE — 85610 PROTHROMBIN TIME: CPT | Performed by: FAMILY MEDICINE

## 2017-12-04 PROCEDURE — 99211 OFF/OP EST MAY X REQ PHY/QHP: CPT | Performed by: FAMILY MEDICINE

## 2017-12-04 NOTE — PROGRESS NOTES
Anticoagulation Summary  As of 12/4/2017    INR goal:   2.0-3.0   TTR:   --   Today's INR:   6.7!   Maintenance plan:   7.5 mg (5 mg x 1.5) every day   Weekly total:   52.5 mg   Plan last modified:   Rai Ferrell, PharmD (12/4/2017)   Next INR check:   12/11/2017   Target end date:   Indefinite    Indications    Deep vein thrombosis (CMS-HCC) [I82.409] [I82.409]  Pulmonary embolism (CMS-HCC) [I26.99] [I26.99]  Atrial fibrillation (CMS-HCC) [I48.91] [I48.91]             Anticoagulation Episode Summary     INR check location:   [facility]    Preferred lab:       Send INR reminders to:       Comments:         Anticoagulation Care Providers     Provider Role Specialty Phone number    Graciela Andersen M.D. Responsible Family Medicine [phone]    [facility] Responsible  [phone]        Anticoagulation Patient Findings      Tj Bauman referred to the [facility] from Dr. Andersen (see media). I was not able to determine the reason he was on Warfarin from the referral. Per the patient, he got a DVT/PE about 10 years ago. Does not appear to be provoked. He looks like he might have afib from his vitals today and he does have Afib on his problem list.  He did have a problem with ETOH but reports he is not drinking.      CHADSVAS=at least 2 but poor historian.     INR  Supra -therapeutic.      Pt gave verbal consent  to leave a VM with detailed medical information regarding INR and dose of warfarin.    Pt tolerating medication well, no s/s of bleeding.     Med rec done with pt, he will look at all of his home meds and let us know what he is on next time.     Pt education done (s/s of bleeding, when to f/u with clinic vs ER, foods with vitamin K, etc)    Follow up appointment in 4 days     Hold two days then decrease weekly warfarin dose as noted    Rai Ferrell, PharmD

## 2017-12-06 ENCOUNTER — TELEPHONE (OUTPATIENT)
Dept: VASCULAR LAB | Facility: MEDICAL CENTER | Age: 72
End: 2017-12-06

## 2017-12-06 NOTE — TELEPHONE ENCOUNTER
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  Based on that conversation, pending any further recommendations from PCP, we will continue with indefinite anticoagulation    Michael J. Bloch, MD  [facility]    CC: Graciela Andersen

## 2017-12-15 ENCOUNTER — HOSPITAL ENCOUNTER (OUTPATIENT)
Dept: LAB | Facility: MEDICAL CENTER | Age: 72
End: 2017-12-15
Attending: INTERNAL MEDICINE
Payer: MEDICARE

## 2017-12-15 DIAGNOSIS — Z79.01 CHRONIC ANTICOAGULATION: ICD-10-CM

## 2017-12-15 LAB
INR PPP: 1.42 (ref 0.87–1.13)
PROTHROMBIN TIME: 17 SEC (ref 12–14.6)

## 2017-12-15 PROCEDURE — 85610 PROTHROMBIN TIME: CPT

## 2017-12-15 PROCEDURE — 36415 COLL VENOUS BLD VENIPUNCTURE: CPT

## 2017-12-18 ENCOUNTER — ANTICOAGULATION VISIT (OUTPATIENT)
Dept: MEDICAL GROUP | Facility: PHYSICIAN GROUP | Age: 72
End: 2017-12-18
Payer: MEDICARE

## 2017-12-18 VITALS
SYSTOLIC BLOOD PRESSURE: 130 MMHG | WEIGHT: 185 LBS | HEART RATE: 75 BPM | DIASTOLIC BLOOD PRESSURE: 70 MMHG | BODY MASS INDEX: 24.73 KG/M2

## 2017-12-18 DIAGNOSIS — I48.91 ATRIAL FIBRILLATION, UNSPECIFIED TYPE (HCC): ICD-10-CM

## 2017-12-18 LAB — INR PPP: 1.5 (ref 2–3.5)

## 2017-12-18 PROCEDURE — 85610 PROTHROMBIN TIME: CPT | Performed by: FAMILY MEDICINE

## 2017-12-18 PROCEDURE — 99211 OFF/OP EST MAY X REQ PHY/QHP: CPT | Performed by: FAMILY MEDICINE

## 2017-12-18 NOTE — PROGRESS NOTES
OP Anticoagulation Service Note    Date: 12/18/2017  Vitals:    12/18/17 0957   BP: 130/70   Pulse: 75   Weight: 83.9 kg (185 lb)       Anticoagulation Summary  As of 12/18/2017    INR goal:   2.0-3.0   TTR:   5.8 % (4 d)   Today's INR:   1.5!   Maintenance plan:   7.5 mg (5 mg x 1.5) on Mon, Wed; 5 mg (5 mg x 1) all other days   Weekly total:   40 mg   Plan last modified:   Rai Ferrell, PharmD (12/18/2017)   Next INR check:   12/26/2017   Target end date:   Indefinite    Indications    Deep vein thrombosis (CMS-HCC) [I82.409] [I82.409]  Pulmonary embolism (CMS-HCC) [I26.99] [I26.99]             Anticoagulation Episode Summary     INR check location:   [facility]    Preferred lab:       Send INR reminders to:       Comments:         Anticoagulation Care Providers     Provider Role Specialty Phone number    Graciela Andersen M.D. Responsible Family Medicine [phone]    [facility] Responsible  [phone]        Anticoagulation Patient Findings      HPI:   Tj Bauman seen in clinic today, they are here today for a INR check on anticoagulation therapy with warfarin because they have a PMH DVT/PE    The reason for today's visit is to prevent morbidity and mortality from a  Blood clot and to reduce the risk of bleeding while on a anticoagulant.     Reason for today's visit (per our collaborative practice policy) is because their last INR was 1.42 on 12/15.  And 6.7 on 12/4. He held for two days then decreased the dose to 5mg once daily.     Additional education provided today regarding reducing bleed risk and dietary constraints: Yes, diet    Any upcoming  procedures: none    Confirmed warfarin dosing regimen  Interval Changes with foods rich in vitamin K:No  Interval Changes in ETOH:  No  Interval Changes in smoking status: No  Interval Changes in medication: not sure what meds he is on, I asked him to bring them in or a list.   Cost restriction: No    S/S of bleeding or  bruising:  No  Signs/symptoms  thrombosis since the last appt: No  Bleed risk is: moderate,     3 vitals included with today's appt:Yes  (BP, HR, weight, ht, RR)     Assessment:   INR  sub-therapeutic.     Possible reason(s) INR is not in range today:   his diet is not consistent, and he is a poor historian, he has also had a problem with ETOH in the past, his DVT was years ago so no Lovenox today and his bleed risk would be too high as he recently had a INR of 6.7.  He also has poor f/u and missed a appt after our last f/u and was a week late on getting his INR done.       Intervention required today to prevent:   Recurrence of thrombosis     Plan:  Increase weekly warfarin dose as noted    Follow up:  Follow up appointment in 1 week(s) because  Via the lab  They have a TTR of 5.8% which is not at target (TTR target/goal is 100%) and requires close follow up to prevent a adverse event (the lower the TTR the higher risk of clots, strokes, or bleeding).     Other info:  Pt educated to contact our clinic with any changes in medications or s/s of bleeding or thrombosis    CHEST guidelines recommend frequent INR monitoring at regular intervals (a few days up to a max of 12 weeks) to ensure they are on the proper dose of warfarin and not having any complications from therapy.  INRs can dramatically change over a short time period due to diet, medications, and medical conditions.

## 2017-12-26 ENCOUNTER — HOSPITAL ENCOUNTER (OUTPATIENT)
Dept: LAB | Facility: MEDICAL CENTER | Age: 72
End: 2017-12-26
Attending: INTERNAL MEDICINE
Payer: MEDICARE

## 2017-12-26 DIAGNOSIS — Z79.01 CHRONIC ANTICOAGULATION: ICD-10-CM

## 2017-12-26 LAB
INR PPP: 1.29 (ref 0.87–1.13)
PROTHROMBIN TIME: 15.8 SEC (ref 12–14.6)

## 2017-12-26 PROCEDURE — 36415 COLL VENOUS BLD VENIPUNCTURE: CPT

## 2017-12-26 PROCEDURE — 85610 PROTHROMBIN TIME: CPT

## 2017-12-28 ENCOUNTER — ANTICOAGULATION MONITORING (OUTPATIENT)
Dept: VASCULAR LAB | Facility: MEDICAL CENTER | Age: 72
End: 2017-12-28

## 2017-12-28 NOTE — PROGRESS NOTES
Anticoagulation Summary  As of 12/28/2017    INR goal:   2.0-3.0   TTR:   2.0 % (1.7 wk)   Today's INR:   1.29! (12/26/2017)   Maintenance plan:   7.5 mg (5 mg x 1.5) every day   Weekly total:   52.5 mg   Plan last modified:   Rai Ferrell, PharmD (12/28/2017)   Next INR check:   1/2/2018   Target end date:   Indefinite    Indications    Deep vein thrombosis (CMS-HCC) [I82.409] [I82.409]  Pulmonary embolism (CMS-HCC) [I26.99] [I26.99]             Anticoagulation Episode Summary     INR check location:   [facility]    Preferred lab:       Send INR reminders to:       Comments:         Anticoagulation Care Providers     Provider Role Specialty Phone number    Graciela Andersen M.D. Responsible Family Medicine [phone]    [facility] Responsible  [phone]        Anticoagulation Patient Findings        Spoke to patient on the phone.   INR  sub-therapeutic.   Denies signs/symptoms of bleeding and/or thrombosis.   Denies changes to diet or medications.   Follow up appointment in 4 days via the lab     Increase weekly warfarin dose as noted      Rai Ferrell, PharmD

## 2018-01-02 ENCOUNTER — HOSPITAL ENCOUNTER (OUTPATIENT)
Dept: LAB | Facility: MEDICAL CENTER | Age: 73
End: 2018-01-02
Attending: FAMILY MEDICINE
Payer: MEDICARE

## 2018-01-02 DIAGNOSIS — Z79.01 CHRONIC ANTICOAGULATION: ICD-10-CM

## 2018-01-02 LAB
INR PPP: 1.72 (ref 0.87–1.13)
PROTHROMBIN TIME: 19.8 SEC (ref 12–14.6)

## 2018-01-02 PROCEDURE — 85610 PROTHROMBIN TIME: CPT

## 2018-01-02 PROCEDURE — 36415 COLL VENOUS BLD VENIPUNCTURE: CPT

## 2018-01-03 ENCOUNTER — ANTICOAGULATION MONITORING (OUTPATIENT)
Dept: VASCULAR LAB | Facility: MEDICAL CENTER | Age: 73
End: 2018-01-03

## 2018-01-08 ENCOUNTER — ANTICOAGULATION VISIT (OUTPATIENT)
Dept: MEDICAL GROUP | Facility: PHYSICIAN GROUP | Age: 73
End: 2018-01-08
Payer: MEDICARE

## 2018-01-08 VITALS
DIASTOLIC BLOOD PRESSURE: 95 MMHG | SYSTOLIC BLOOD PRESSURE: 119 MMHG | HEART RATE: 93 BPM | WEIGHT: 185 LBS | BODY MASS INDEX: 24.73 KG/M2

## 2018-01-08 DIAGNOSIS — I48.91 ATRIAL FIBRILLATION, UNSPECIFIED TYPE (HCC): ICD-10-CM

## 2018-01-08 LAB — INR PPP: 1.8 (ref 2–3.5)

## 2018-01-08 PROCEDURE — 85610 PROTHROMBIN TIME: CPT | Performed by: FAMILY MEDICINE

## 2018-01-08 PROCEDURE — 99211 OFF/OP EST MAY X REQ PHY/QHP: CPT | Performed by: FAMILY MEDICINE

## 2018-01-08 NOTE — PROGRESS NOTES
OP Anticoagulation Service Note    Date: 1/8/2018  Vitals:    01/08/18 1036   BP: 119/95   Pulse: 93   Weight: 83.9 kg (185 lb)       Anticoagulation Summary  As of 1/8/2018    INR goal:   2.0-3.0   TTR:   1.0 % (3.6 wk)   Today's INR:   1.8!   Maintenance plan:   10 mg (5 mg x 2) on Mon, Wed, Fri; 7.5 mg (5 mg x 1.5) all other days   Weekly total:   60 mg   Plan last modified:   Rai Ferrell, PharmD (1/8/2018)   Next INR check:   1/22/2018   Target end date:   Indefinite    Indications    Deep vein thrombosis (CMS-HCC) [I82.409] [I82.409]  Pulmonary embolism (CMS-HCC) [I26.99] [I26.99]  Atrial fibrillation (CMS-HCC) [I48.91] [I48.91]             Anticoagulation Episode Summary     INR check location:   [facility]    Preferred lab:       Send INR reminders to:       Comments:         Anticoagulation Care Providers     Provider Role Specialty Phone number    Graciela Andersen M.D. Responsible Family Medicine [phone]    [facility] Responsible  [phone]        Anticoagulation Patient Findings      HPI:   Tj J CARLOS Bauman seen in clinic today, they are here today for a INR check on anticoagulation therapy with warfarin because they have a PMH of DVTs and PEs and Afib    The reason for today's visit is to prevent morbidity and mortality from a  Blood clot and stroke and to reduce the risk of bleeding while on a anticoagulant.     Reason for today's visit (per our collaborative practice policy) is because their last INR was 1.72 on 1/2/2017.  Intervention at the last visit: Bolus that day then continued with normal weekly dose of warfarin.     Additional education provided today regarding reducing bleed risk and dietary constraints: No    Any upcoming  procedures: none    Confirmed warfarin dosing regimen  Interval Changes with foods rich in vitamin K:No  Interval Changes in ETOH:  No  Interval Changes in smoking status: No  Interval Changes in medication: yes, flomax    Cost  restriction: No    S/S of bleeding or bruising:  No  Signs/symptoms  thrombosis since the last appt: No  Bleed risk is: moderate,     3 vitals included with today's appt:yes      Assessment:   INR  sub-therapeutic.     Possible reason(s) INR is not in range today:   Missed dose, new med not likely to change INR.   He has some memory problems and is not always able to remember what dose he is taking.     Intervention required today to prevent:    They are at a increased risk of clots because INR is below goal.    They have a TTR of <10% which is not at target (TTR target/goal is 100%) and requires close follow up to prevent a adverse event (the lower the TTR the higher risk of clots, strokes, or bleeding).       Plan:  Increase weekly warfarin dose as noted    Follow up:  Follow up appointment in 2 week(s) per pt, he does not want to use the lab.       Other info:  Pt educated to contact our clinic with any changes in medications or s/s of bleeding or thrombosis    CHEST guidelines recommend frequent INR monitoring at regular intervals (a few days up to a max of 12 weeks) to ensure they are on the proper dose of warfarin and not having any complications from therapy.  INRs can dramatically change over a short time period due to diet, medications, and medical conditions.

## 2018-01-22 ENCOUNTER — ANTICOAGULATION VISIT (OUTPATIENT)
Dept: MEDICAL GROUP | Facility: PHYSICIAN GROUP | Age: 73
End: 2018-01-22
Payer: MEDICARE

## 2018-01-22 DIAGNOSIS — I48.91 ATRIAL FIBRILLATION, UNSPECIFIED TYPE (HCC): ICD-10-CM

## 2018-01-22 LAB — INR PPP: 1.6 (ref 2–3.5)

## 2018-01-22 PROCEDURE — 85610 PROTHROMBIN TIME: CPT | Performed by: FAMILY MEDICINE

## 2018-01-22 PROCEDURE — 99211 OFF/OP EST MAY X REQ PHY/QHP: CPT | Performed by: FAMILY MEDICINE

## 2018-01-22 NOTE — PROGRESS NOTES
OP Anticoagulation Service Note    Date: 1/22/2018  There were no vitals filed for this visit.    Anticoagulation Summary  As of 1/22/2018    INR goal:   2.0-3.0   TTR:   0.6 % (1.3 mo)   Today's INR:   1.6!   Maintenance plan:   7.5 mg (5 mg x 1.5) on Tue, Thu; 10 mg (5 mg x 2) all other days   Weekly total:   65 mg   Plan last modified:   Rai Ferrell, PharmD (1/22/2018)   Next INR check:   2/5/2018   Target end date:   Indefinite    Indications    Deep vein thrombosis (CMS-HCC) [I82.409] [I82.409]  Pulmonary embolism (CMS-HCC) [I26.99] [I26.99]  Atrial fibrillation (CMS-HCC) [I48.91] [I48.91]             Anticoagulation Episode Summary     INR check location:   [facility]    Preferred lab:       Send INR reminders to:       Comments:         Anticoagulation Care Providers     Provider Role Specialty Phone number    Graciela Andersen M.D. Responsible Family Medicine [phone]    [facility] Responsible  [phone]        Anticoagulation Patient Findings      HPI:   Tj Bauman seen in clinic today, they are here today for a INR check on anticoagulation therapy with warfarin because they have a PMH of DVTs and PE    The reason for today's visit is to prevent morbidity and mortality from a clot and to reduce the risk of bleeding while on a anticoagulant.     Reason for today's visit (per our collaborative practice policy) is because their last INR was 1.8  on 1/8/2018.  Intervention at the last visit:increased the dose of warfarin     Additional education provided today regarding reducing bleed risk and dietary constraints: Yes, about vegetables    Any upcoming  procedures: none    Confirmed warfarin dosing regimen  Interval Changes with foods rich in vitamin K:No  Interval Changes in ETOH:  No  Interval Changes in smoking status: No  Interval Changes in medication: No   Cost restriction: No    S/S of bleeding or bruising:  No  Signs/symptoms  thrombosis since the last appt:  No  Bleed risk is: moderate,     3 vitals included with today's appt:No  Declined     Assessment:   INR  sub-therapeutic.     Possible reason(s) INR is not in range today:   Unknown etiology, he is having some memory problems, he does realize that his memory is poor but uses tools to help him remember his dose.     Intervention required today to prevent:    They are at a increased risk of clots because INR is below goal.      They have a TTR of 0.6 which is not at target (TTR target/goal is 100%) and requires close follow up to prevent a adverse event (the lower the TTR the higher risk of clots, strokes, or bleeding).       Plan:  Increase weekly warfarin dose as noted      Follow up:  Follow up appointment in 2 week(s) per pt       Other info:  Pt educated to contact our clinic with any changes in medications or s/s of bleeding or thrombosis    CHEST guidelines recommend frequent INR monitoring at regular intervals (a few days up to a max of 12 weeks) to ensure they are on the proper dose of warfarin and not having any complications from therapy.  INRs can dramatically change over a short time period due to diet, medications, and medical conditions.

## 2018-02-05 ENCOUNTER — ANTICOAGULATION VISIT (OUTPATIENT)
Dept: MEDICAL GROUP | Facility: PHYSICIAN GROUP | Age: 73
End: 2018-02-05
Payer: MEDICARE

## 2018-02-05 DIAGNOSIS — I48.91 ATRIAL FIBRILLATION, UNSPECIFIED TYPE (HCC): ICD-10-CM

## 2018-02-05 LAB — INR PPP: 1.4 (ref 2–3.5)

## 2018-02-05 PROCEDURE — 85610 PROTHROMBIN TIME: CPT | Performed by: FAMILY MEDICINE

## 2018-02-05 NOTE — PROGRESS NOTES
OP Anticoagulation Service Note    Date: 2/5/2018  There were no vitals filed for this visit.    Anticoagulation Summary  As of 2/5/2018    INR goal:   2.0-3.0   TTR:   0.5 % (1.8 mo)   Today's INR:   1.4!   Maintenance plan:   7.5 mg (5 mg x 1.5) on Tue, Thu; 10 mg (5 mg x 2) all other days   Weekly total:   65 mg   Plan last modified:   Rai Ferrell, PharmD (1/22/2018)   Next INR check:   2/12/2018   Target end date:   Indefinite    Indications    Deep vein thrombosis (CMS-HCC) [I82.409] [I82.409]  Pulmonary embolism (CMS-HCC) [I26.99] [I26.99]  Atrial fibrillation (CMS-HCC) [I48.91] [I48.91]             Anticoagulation Episode Summary     INR check location:   [facility]    Preferred lab:       Send INR reminders to:       Comments:         Anticoagulation Care Providers     Provider Role Specialty Phone number    Graciela Andersen M.D. Responsible Family Medicine [phone]    [facility] Responsible  [phone]        Anticoagulation Patient Findings      HPI:   Tj Bauman seen in clinic today, they are here today for a INR check on anticoagulation therapy with warfarin because they have a PMH of DVT and PE    The reason for today's visit is to prevent morbidity and mortality from a  Blood clot and to reduce the risk of bleeding while on a anticoagulant.     Reason for today's visit (per our collaborative practice policy) is because their last INR was 1.4 on 2/5/2017.  Intervention at the last visit: increased the dose    Additional education provided today regarding reducing bleed risk and dietary constraints: Yes about following the dosing instructions that we are giving him as he is not.     Any upcoming  procedures: none    Confirmed warfarin dosing regimen  Interval Changes with foods rich in vitamin K:No  Interval Changes in ETOH:  No  Interval Changes in smoking status: No  Interval Changes in medication: No   Cost restriction: No    S/S of bleeding or bruising:   No  Signs/symptoms  thrombosis since the last appt: No  Bleed risk is: moderate,     3 vitals included with today's appt:  None today     Assessment:   INR  sub-therapeutic.     Possible reason(s) INR is not in range today:   He is not using the dosing calendar. We talked a lot about using the dosing calendar and ways to make sure he is taking the correct dose.     Intervention required today to prevent:    They are at a increased risk of clots because INR is below goal.      They have a TTR of 0.5 which is not at target (TTR target/goal is 100%) and requires close follow up to prevent a adverse event (the lower the TTR the higher risk of clots, strokes, or bleeding).       Plan:  Increase weekly warfarin dose as noted      Follow up:  Follow up appointment in 1 week(s)       Other info:  Pt educated to contact our clinic with any changes in medications or s/s of bleeding or thrombosis    CHEST guidelines recommend frequent INR monitoring at regular intervals (a few days up to a max of 12 weeks) to ensure they are on the proper dose of warfarin and not having any complications from therapy.  INRs can dramatically change over a short time period due to diet, medications, and medical conditions.

## 2018-02-12 ENCOUNTER — ANTICOAGULATION VISIT (OUTPATIENT)
Dept: MEDICAL GROUP | Facility: PHYSICIAN GROUP | Age: 73
End: 2018-02-12
Payer: MEDICARE

## 2018-02-12 DIAGNOSIS — I48.91 ATRIAL FIBRILLATION, UNSPECIFIED TYPE (HCC): ICD-10-CM

## 2018-02-12 LAB — INR PPP: 1.4 (ref 2–3.5)

## 2018-02-12 PROCEDURE — 85610 PROTHROMBIN TIME: CPT | Performed by: FAMILY MEDICINE

## 2018-02-12 NOTE — PROGRESS NOTES
OP Anticoagulation Service Note    Date: 2/12/2018  There were no vitals filed for this visit.    Anticoagulation Summary  As of 2/12/2018    INR goal:   2.0-3.0   TTR:   0.4 % (2 mo)   Today's INR:   1.4!   Maintenance plan:   10 mg (5 mg x 2) every day   Weekly total:   70 mg   Plan last modified:   Rai Ferrell, PharmD (2/12/2018)   Next INR check:   2/26/2018   Target end date:   Indefinite    Indications    Deep vein thrombosis (CMS-HCC) [I82.409] [I82.409]  Pulmonary embolism (CMS-HCC) [I26.99] [I26.99]  Atrial fibrillation (CMS-HCC) [I48.91] [I48.91]             Anticoagulation Episode Summary     INR check location:   [facility]    Preferred lab:       Send INR reminders to:       Comments:         Anticoagulation Care Providers     Provider Role Specialty Phone number    Graciela Andersen M.D. Responsible Family Medicine [phone]    [facility] Responsible  [phone]        Anticoagulation Patient Findings      HPI:   Tj Bauman seen in clinic today, they are here today for a INR check on anticoagulation therapy with warfarin because they have a PMH of DVTs and PEs    The reason for today's visit is to prevent morbidity and mortality from a blood clot  and to reduce the risk of bleeding while on a anticoagulant.     Reason for today's visit (per our collaborative practice policy) is because their last INR was 1.4  on  1/5/2018.   Intervention at the last visit:  I increased the dose of warfarin for him.     Additional education provided today regarding reducing bleed risk and dietary constraints:  Yes about keeping the amount of vegetables the same per week and eat a similar serving size     Any upcoming procedures:   none    Confirmed warfarin dosing regimen  Interval Changes with foods rich in vitamin K: No  Interval Changes in ETOH:   No  Interval Changes in smoking status:  No  Interval Changes in medication:  No   Cost restriction:  No    S/S of bleeding or  bruising:  No  Signs/symptoms  thrombosis since the last appt:  No  Bleed risk is:  moderate,     3 vitals included with today's appt : none today   (BP, HR, weight, ht, RR)     Assessment:   INR  sub-therapeutic.     Possible reason(s) for INR today:   he is taking the correct dose. He was having a hard time remembering the dose, so last week we talked about ways to use the calendar. His INR have been very labile and we will not use Lovenox today as he is concerned about bleeding with this higher dose.      Intervention required today to prevent:    They are at a increased risk of clots because INR is below goal.      They have a TTR of 0.4  which is not at target (TTR target/goal is 100%) and requires close follow up to prevent a adverse event (the lower the TTR the higher risk of clots, strokes, or bleeding).       Plan:  Continue weekly warfarin dose as noted      Follow up:  Follow up appointment in 1 week(s)  Via the lab as we are closed Monday of next week.       Other info:  Pt educated to contact our clinic with any changes in medications or s/s of bleeding or thrombosis    CHEST guidelines recommend frequent INR monitoring at regular intervals (a few days up to a max of 12 weeks) to ensure they are on the proper dose of warfarin and not having any complications from therapy.  INRs can dramatically change over a short time period due to diet, medications, and medical conditions.

## 2018-02-20 ENCOUNTER — HOSPITAL ENCOUNTER (OUTPATIENT)
Dept: LAB | Facility: MEDICAL CENTER | Age: 73
End: 2018-02-20
Attending: INTERNAL MEDICINE
Payer: MEDICARE

## 2018-02-20 LAB
INR PPP: 2.15 (ref 0.87–1.13)
PROTHROMBIN TIME: 23.7 SEC (ref 12–14.6)

## 2018-02-20 PROCEDURE — 36415 COLL VENOUS BLD VENIPUNCTURE: CPT

## 2018-02-20 PROCEDURE — 85610 PROTHROMBIN TIME: CPT

## 2018-02-21 ENCOUNTER — ANTICOAGULATION MONITORING (OUTPATIENT)
Dept: VASCULAR LAB | Facility: MEDICAL CENTER | Age: 73
End: 2018-02-21

## 2018-02-21 DIAGNOSIS — I27.82 CHRONIC SEPTIC PULMONARY EMBOLISM WITH ACUTE COR PULMONALE (HCC): ICD-10-CM

## 2018-02-21 DIAGNOSIS — I82.401 ACUTE DEEP VEIN THROMBOSIS (DVT) OF RIGHT LOWER EXTREMITY, UNSPECIFIED VEIN (HCC): ICD-10-CM

## 2018-02-21 DIAGNOSIS — I26.01 CHRONIC SEPTIC PULMONARY EMBOLISM WITH ACUTE COR PULMONALE (HCC): ICD-10-CM

## 2018-02-21 DIAGNOSIS — I48.0 PAROXYSMAL ATRIAL FIBRILLATION (HCC): ICD-10-CM

## 2018-02-21 NOTE — PROGRESS NOTES
Anticoagulation Summary  As of 2/21/2018    INR goal:   2.0-3.0   TTR:   2.8 % (2.3 mo)   Today's INR:   2.15 (2/20/2018)   Maintenance plan:   10 mg (5 mg x 2) every day   Weekly total:   70 mg   No change documented:   Marvin Jarvis Med Ass't   Plan last modified:   Rai Ferrell, PharmD (2/12/2018)   Next INR check:   2/27/2018   Target end date:   Indefinite    Indications    Deep vein thrombosis (CMS-HCC) [I82.409] [I82.409]  Pulmonary embolism (CMS-HCC) [I26.99] [I26.99]  Atrial fibrillation (CMS-HCC) [I48.91] [I48.91]             Anticoagulation Episode Summary     INR check location:   [facility]    Preferred lab:       Send INR reminders to:       Comments:         Anticoagulation Care Providers     Provider Role Specialty Phone number    Graciela Andersen M.D. Responsible Family Medicine [phone]    [facility] Responsible  [phone]        Anticoagulation Patient Findings  Patient Findings     Negatives:   Signs/symptoms of thrombosis, Signs/symptoms of bleeding, Laboratory test error suspected, Change in health, Change in alcohol use, Change in activity, Upcoming invasive procedure, Emergency department visit, Upcoming dental procedure, Missed doses, Extra doses, Change in medications, Change in diet/appetite, Hospital admission, Bruising, Other complaints        Spoke with patient to report a therapeutic INR.  Pt instructed to continue with current warfarin dosing regimen. Pt denies any s/s of bleeding, bruising, clotting or any changes to diet or medication.  Will follow up in 1 week.    Marvin Jarvis, Med Ass't    I have reviewed and am in agreement with the above stated plan.  Alexander Gonzalez, PharmD

## 2018-02-22 ENCOUNTER — OFFICE VISIT (OUTPATIENT)
Dept: CARDIOLOGY | Facility: MEDICAL CENTER | Age: 73
End: 2018-02-22
Payer: MEDICARE

## 2018-02-22 VITALS
HEIGHT: 73 IN | SYSTOLIC BLOOD PRESSURE: 110 MMHG | OXYGEN SATURATION: 94 % | BODY MASS INDEX: 24.78 KG/M2 | DIASTOLIC BLOOD PRESSURE: 70 MMHG | RESPIRATION RATE: 14 BRPM | WEIGHT: 187 LBS | HEART RATE: 100 BPM

## 2018-02-22 DIAGNOSIS — R01.1 SYSTOLIC MURMUR: ICD-10-CM

## 2018-02-22 LAB — EKG IMPRESSION: NORMAL

## 2018-02-22 PROCEDURE — 93000 ELECTROCARDIOGRAM COMPLETE: CPT | Performed by: INTERNAL MEDICINE

## 2018-02-22 PROCEDURE — 99204 OFFICE O/P NEW MOD 45 MIN: CPT | Performed by: INTERNAL MEDICINE

## 2018-02-22 RX ORDER — TAMSULOSIN HYDROCHLORIDE 0.4 MG/1
CAPSULE ORAL DAILY
Refills: 11 | COMMUNITY
Start: 2018-02-05

## 2018-02-22 RX ORDER — WARFARIN SODIUM 5 MG/1
TABLET ORAL
Refills: 0 | COMMUNITY
Start: 2018-01-04 | End: 2018-03-12

## 2018-02-22 RX ORDER — GABAPENTIN 300 MG/1
CAPSULE ORAL
Refills: 1 | COMMUNITY
Start: 2017-11-25 | End: 2019-04-29

## 2018-02-22 RX ORDER — DOXYCYCLINE HYCLATE 100 MG
100 TABLET ORAL 2 TIMES DAILY
Refills: 2 | COMMUNITY
Start: 2018-01-24 | End: 2019-04-29

## 2018-02-22 RX ORDER — MORPHINE SULFATE 30 MG/1
30 TABLET, FILM COATED, EXTENDED RELEASE ORAL PRN
Refills: 0 | COMMUNITY
Start: 2018-01-26 | End: 2019-04-29

## 2018-02-22 RX ORDER — PNV NO.95/FERROUS FUM/FOLIC AC 28MG-0.8MG
1 TABLET ORAL 2 TIMES DAILY
COMMUNITY

## 2018-02-22 RX ORDER — CYCLOBENZAPRINE HCL 10 MG
10 TABLET ORAL PRN
Refills: 4 | COMMUNITY
Start: 2017-11-26 | End: 2019-04-29

## 2018-02-22 ASSESSMENT — ENCOUNTER SYMPTOMS
CHILLS: 0
MEMORY LOSS: 1
MYALGIAS: 0
SHORTNESS OF BREATH: 0
INSOMNIA: 0
PALPITATIONS: 0
CLAUDICATION: 0
ABDOMINAL PAIN: 0
NECK PAIN: 1
PND: 0
DIZZINESS: 1
LOSS OF CONSCIOUSNESS: 0
BACK PAIN: 1
BLURRED VISION: 0
FEVER: 0
ORTHOPNEA: 0
FALLS: 1
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
## 2018-02-26 ENCOUNTER — ANTICOAGULATION VISIT (OUTPATIENT)
Dept: MEDICAL GROUP | Facility: PHYSICIAN GROUP | Age: 73
End: 2018-02-26
Payer: MEDICARE

## 2018-02-26 DIAGNOSIS — I82.401 ACUTE DEEP VEIN THROMBOSIS (DVT) OF RIGHT LOWER EXTREMITY, UNSPECIFIED VEIN (HCC): ICD-10-CM

## 2018-02-26 DIAGNOSIS — I26.01 CHRONIC SEPTIC PULMONARY EMBOLISM WITH ACUTE COR PULMONALE (HCC): ICD-10-CM

## 2018-02-26 DIAGNOSIS — I27.82 CHRONIC SEPTIC PULMONARY EMBOLISM WITH ACUTE COR PULMONALE (HCC): ICD-10-CM

## 2018-02-26 DIAGNOSIS — I48.0 PAROXYSMAL ATRIAL FIBRILLATION (HCC): ICD-10-CM

## 2018-02-26 LAB — INR PPP: 3.5 (ref 2–3.5)

## 2018-02-26 PROCEDURE — 85610 PROTHROMBIN TIME: CPT | Performed by: FAMILY MEDICINE

## 2018-02-26 NOTE — PROGRESS NOTES
OP Anticoagulation Service Note    Date: 2/26/2018  There were no vitals filed for this visit.    Anticoagulation Summary  As of 2/26/2018    INR goal:   2.0-3.0   TTR:   7.5 % (2.5 mo)   Today's INR:   3.5!   Maintenance plan:   5 mg (5 mg x 1) on Mon; 10 mg (5 mg x 2) all other days   Weekly total:   65 mg   Plan last modified:   Rai Ferrell, PharmD (2/26/2018)   Next INR check:   3/5/2018   Target end date:   Indefinite    Indications    Deep vein thrombosis (CMS-HCC) [I82.409] [I82.409]  Pulmonary embolism (CMS-HCC) [I26.99] [I26.99]  Atrial fibrillation (CMS-HCC) [I48.91] [I48.91]             Anticoagulation Episode Summary     [facility] check location[facility]   [facility]    Preferred lab:       Send INR reminders to:       Comments:         Anticoagulation Care Providers     Provider Role Specialty Phone number    Graciela Andersen M.D. Responsible Family Medicine [phone]    [facility] Responsible  [phone]        Anticoagulation Patient Findings    HPI:   Tj Bauman seen in clinic today, they are here today for a INR check on anticoagulation therapy with warfarin because they have a history of PEs and DVTs     The reason for today's visit is to prevent morbidity and mortality from another blood clot  and to reduce the risk of bleeding while on a anticoagulant.     Reason for today's visit (per our collaborative practice policy) is because their last INR was 2.15  on  2/20//2018.   Intervention at the last visit:  None needed at that time he continued to keep the dose at 10 mg once daily.     Additional education provided today regarding reducing bleed risk and dietary constraints:  About bleeding and elevated INRs    Any upcoming procedures:   None at this time.     Confirmed warfarin dosing regimen  Interval Changes with foods rich in vitamin K: No  Interval Changes in ETOH:   No  Interval Changes in smoking status:  No  Interval Changes in medication:  No   Cost  restriction:  No    S/S of bleeding or bruising:  No  Signs/symptoms  thrombosis since the last appt:  No  Bleed risk is:  moderate,     3 vitals included with today's appt :No he declined vitals today.   (BP, HR, weight, ht, RR)     Assessment:   INR  supra-therapeutic.     Possible reason(s) for INR today:   the weekly dose must to too much as he did make any changes to his diet over the last week and has been working hard to make sure his dose is correct.     Intervention required today to prevent:         They have a TTR of 7.5  which is not at target (TTR target/goal is 100%) and requires close follow up to prevent a adverse event (the lower the TTR the higher risk of clots, strokes, or bleeding).       Plan:  Decrease weekly warfarin dose as noted      Follow up:  Follow up appointment in 1 week(s)       Other info:  Pt educated to contact our clinic with any changes in medications or s/s of bleeding or thrombosis    CHEST guidelines recommend frequent INR monitoring at regular intervals (a few days up to a max of 12 weeks) to ensure they are on the proper dose of warfarin and not having any complications from therapy.  INRs can dramatically change over a short time period due to diet, medications, and medical conditions.

## 2018-03-05 ENCOUNTER — HOSPITAL ENCOUNTER (INPATIENT)
Dept: HOSPITAL 8 - ED | Age: 73
LOS: 3 days | Discharge: HOME | DRG: 871 | End: 2018-03-08
Attending: INTERNAL MEDICINE | Admitting: INTERNAL MEDICINE
Payer: MEDICARE

## 2018-03-05 VITALS — SYSTOLIC BLOOD PRESSURE: 142 MMHG | DIASTOLIC BLOOD PRESSURE: 89 MMHG

## 2018-03-05 VITALS — BODY MASS INDEX: 24.98 KG/M2 | WEIGHT: 188.5 LBS | HEIGHT: 73 IN

## 2018-03-05 DIAGNOSIS — F03.90: ICD-10-CM

## 2018-03-05 DIAGNOSIS — A41.9: Primary | ICD-10-CM

## 2018-03-05 DIAGNOSIS — Z86.711: ICD-10-CM

## 2018-03-05 DIAGNOSIS — J98.11: ICD-10-CM

## 2018-03-05 DIAGNOSIS — F10.239: ICD-10-CM

## 2018-03-05 DIAGNOSIS — Z72.0: ICD-10-CM

## 2018-03-05 DIAGNOSIS — F11.20: ICD-10-CM

## 2018-03-05 DIAGNOSIS — M47.812: ICD-10-CM

## 2018-03-05 DIAGNOSIS — G89.29: ICD-10-CM

## 2018-03-05 DIAGNOSIS — I10: ICD-10-CM

## 2018-03-05 DIAGNOSIS — G92: ICD-10-CM

## 2018-03-05 DIAGNOSIS — D68.9: ICD-10-CM

## 2018-03-05 DIAGNOSIS — E87.1: ICD-10-CM

## 2018-03-05 DIAGNOSIS — R74.8: ICD-10-CM

## 2018-03-05 DIAGNOSIS — Z88.6: ICD-10-CM

## 2018-03-05 DIAGNOSIS — J69.0: ICD-10-CM

## 2018-03-05 DIAGNOSIS — Z79.01: ICD-10-CM

## 2018-03-05 DIAGNOSIS — Z86.718: ICD-10-CM

## 2018-03-05 DIAGNOSIS — J15.9: ICD-10-CM

## 2018-03-05 LAB
ALBUMIN SERPL-MCNC: 4.2 G/DL (ref 3.4–5)
ALP SERPL-CCNC: 143 U/L (ref 45–117)
ALT SERPL-CCNC: 15 U/L (ref 12–78)
ANION GAP SERPL CALC-SCNC: 9 MMOL/L (ref 5–15)
BASOPHILS # BLD AUTO: 0.09 X10^3/UL (ref 0–0.1)
BASOPHILS NFR BLD AUTO: 1 % (ref 0–1)
BILIRUB SERPL-MCNC: 0.7 MG/DL (ref 0.2–1)
CALCIUM SERPL-MCNC: 8.8 MG/DL (ref 8.5–10.1)
CHLORIDE SERPL-SCNC: 100 MMOL/L (ref 98–107)
CREAT SERPL-MCNC: 1.2 MG/DL (ref 0.7–1.3)
CULTURE INDICATED?: NO
EOSINOPHIL # BLD AUTO: 0.09 X10^3/UL (ref 0–0.4)
EOSINOPHIL NFR BLD AUTO: 1 % (ref 1–7)
ERYTHROCYTE [DISTWIDTH] IN BLOOD BY AUTOMATED COUNT: 15.4 % (ref 9.4–14.8)
INR PPP: 4.7 (ref 0.93–1.1)
INR PPP: 6.5 (ref 2–3.5)
LYMPHOCYTES # BLD AUTO: 1.14 X10^3/UL (ref 1–3.4)
LYMPHOCYTES NFR BLD AUTO: 12 % (ref 22–44)
MCH RBC QN AUTO: 30.3 PG (ref 27.5–34.5)
MCHC RBC AUTO-ENTMCNC: 33.1 G/DL (ref 33.2–36.2)
MCV RBC AUTO: 91.8 FL (ref 81–97)
MD: NO
MICROSCOPIC: (no result)
MONOCYTES # BLD AUTO: 0.74 X10^3/UL (ref 0.2–0.8)
MONOCYTES NFR BLD AUTO: 8 % (ref 2–9)
NEUTROPHILS # BLD AUTO: 7.63 X10^3/UL (ref 1.8–6.8)
NEUTROPHILS NFR BLD AUTO: 79 % (ref 42–75)
O2 FLOW: 11 L/MIN
PLATELET # BLD AUTO: 380 X10^3/UL (ref 130–400)
PMV BLD AUTO: 6.6 FL (ref 7.4–10.4)
PROT SERPL-MCNC: 8.1 G/DL (ref 6.4–8.2)
PROTHROMBIN TIME: 47.4 SECONDS (ref 9.6–11.5)
RBC # BLD AUTO: 3.87 X10^6/UL (ref 4.38–5.82)
T4 FREE SERPL-MCNC: 1.14 NG/DL (ref 0.76–1.46)
TROPONIN I SERPL-MCNC: 0.02 NG/ML (ref 0–0.04)
TROPONIN I SERPL-MCNC: < 0.015 NG/ML (ref 0–0.04)
TSH SERPL-ACNC: 1.53 MIU/L (ref 0.36–3.74)

## 2018-03-05 PROCEDURE — 84100 ASSAY OF PHOSPHORUS: CPT

## 2018-03-05 PROCEDURE — 96376 TX/PRO/DX INJ SAME DRUG ADON: CPT

## 2018-03-05 PROCEDURE — 83605 ASSAY OF LACTIC ACID: CPT

## 2018-03-05 PROCEDURE — 80053 COMPREHEN METABOLIC PANEL: CPT

## 2018-03-05 PROCEDURE — 80307 DRUG TEST PRSMV CHEM ANLYZR: CPT

## 2018-03-05 PROCEDURE — 87040 BLOOD CULTURE FOR BACTERIA: CPT

## 2018-03-05 PROCEDURE — 81003 URINALYSIS AUTO W/O SCOPE: CPT

## 2018-03-05 PROCEDURE — 85025 COMPLETE CBC W/AUTO DIFF WBC: CPT

## 2018-03-05 PROCEDURE — 85610 PROTHROMBIN TIME: CPT

## 2018-03-05 PROCEDURE — 83735 ASSAY OF MAGNESIUM: CPT

## 2018-03-05 PROCEDURE — 84439 ASSAY OF FREE THYROXINE: CPT

## 2018-03-05 PROCEDURE — 84145 PROCALCITONIN (PCT): CPT

## 2018-03-05 PROCEDURE — 96365 THER/PROPH/DIAG IV INF INIT: CPT

## 2018-03-05 PROCEDURE — 93005 ELECTROCARDIOGRAM TRACING: CPT

## 2018-03-05 PROCEDURE — 71045 X-RAY EXAM CHEST 1 VIEW: CPT

## 2018-03-05 PROCEDURE — 82607 VITAMIN B-12: CPT

## 2018-03-05 PROCEDURE — 96361 HYDRATE IV INFUSION ADD-ON: CPT

## 2018-03-05 PROCEDURE — 70450 CT HEAD/BRAIN W/O DYE: CPT

## 2018-03-05 PROCEDURE — 82140 ASSAY OF AMMONIA: CPT

## 2018-03-05 PROCEDURE — 82962 GLUCOSE BLOOD TEST: CPT

## 2018-03-05 PROCEDURE — 36600 WITHDRAWAL OF ARTERIAL BLOOD: CPT

## 2018-03-05 PROCEDURE — 96375 TX/PRO/DX INJ NEW DRUG ADDON: CPT

## 2018-03-05 PROCEDURE — 93306 TTE W/DOPPLER COMPLETE: CPT

## 2018-03-05 PROCEDURE — 36415 COLL VENOUS BLD VENIPUNCTURE: CPT

## 2018-03-05 PROCEDURE — 84443 ASSAY THYROID STIM HORMONE: CPT

## 2018-03-05 PROCEDURE — 84484 ASSAY OF TROPONIN QUANT: CPT

## 2018-03-05 PROCEDURE — 82803 BLOOD GASES ANY COMBINATION: CPT

## 2018-03-05 PROCEDURE — 87081 CULTURE SCREEN ONLY: CPT

## 2018-03-05 RX ADMIN — LEVETIRACETAM SCH MLS/HR: 100 INJECTION, SOLUTION, CONCENTRATE INTRAVENOUS at 20:15

## 2018-03-05 RX ADMIN — VANCOMYCIN HYDROCHLORIDE SCH MLS/HR: 1 INJECTION, POWDER, LYOPHILIZED, FOR SOLUTION INTRAVENOUS at 18:42

## 2018-03-05 RX ADMIN — LORAZEPAM PRN MG: 2 INJECTION INTRAMUSCULAR; INTRAVENOUS at 15:54

## 2018-03-05 RX ADMIN — LORAZEPAM PRN MG: 2 INJECTION INTRAMUSCULAR; INTRAVENOUS at 19:39

## 2018-03-05 RX ADMIN — SODIUM CHLORIDE SCH MLS/HR: 0.9 INJECTION, SOLUTION INTRAVENOUS at 15:52

## 2018-03-05 RX ADMIN — TAZOBACTAM SODIUM AND PIPERACILLIN SODIUM SCH MLS/HR: 500; 4 INJECTION, SOLUTION INTRAVENOUS at 23:09

## 2018-03-05 RX ADMIN — LORAZEPAM PRN MG: 2 INJECTION INTRAMUSCULAR; INTRAVENOUS at 16:29

## 2018-03-05 RX ADMIN — NICOTINE SCH PATCH: 21 PATCH, EXTENDED RELEASE TRANSDERMAL at 18:37

## 2018-03-05 RX ADMIN — DOXYCYCLINE HYCLATE SCH MG: 100 TABLET, FILM COATED ORAL at 19:39

## 2018-03-05 RX ADMIN — POTASSIUM CHLORIDE SCH MLS/HR: 2 INJECTION, SOLUTION, CONCENTRATE INTRAVENOUS at 17:11

## 2018-03-05 RX ADMIN — LORAZEPAM PRN MG: 2 INJECTION INTRAMUSCULAR; INTRAVENOUS at 22:51

## 2018-03-05 RX ADMIN — TAZOBACTAM SODIUM AND PIPERACILLIN SODIUM SCH MLS/HR: 500; 4 INJECTION, SOLUTION INTRAVENOUS at 15:55

## 2018-03-06 LAB
ALBUMIN SERPL-MCNC: 3.4 G/DL (ref 3.4–5)
ALP SERPL-CCNC: 105 U/L (ref 45–117)
ALT SERPL-CCNC: 14 U/L (ref 12–78)
ANION GAP SERPL CALC-SCNC: 7 MMOL/L (ref 5–15)
BASOPHILS # BLD AUTO: 0.05 X10^3/UL (ref 0–0.1)
BASOPHILS NFR BLD AUTO: 0 % (ref 0–1)
BILIRUB SERPL-MCNC: 0.9 MG/DL (ref 0.2–1)
CALCIUM SERPL-MCNC: 8.6 MG/DL (ref 8.5–10.1)
CHLORIDE SERPL-SCNC: 110 MMOL/L (ref 98–107)
CREAT SERPL-MCNC: 1.21 MG/DL (ref 0.7–1.3)
EOSINOPHIL # BLD AUTO: 0.24 X10^3/UL (ref 0–0.4)
EOSINOPHIL NFR BLD AUTO: 2 % (ref 1–7)
ERYTHROCYTE [DISTWIDTH] IN BLOOD BY AUTOMATED COUNT: 15.4 % (ref 9.4–14.8)
INR PPP: 5.36 (ref 0.93–1.1)
LYMPHOCYTES # BLD AUTO: 0.3 X10^3/UL (ref 1–3.4)
LYMPHOCYTES NFR BLD AUTO: 3 % (ref 22–44)
MCH RBC QN AUTO: 30.1 PG (ref 27.5–34.5)
MCHC RBC AUTO-ENTMCNC: 32.4 G/DL (ref 33.2–36.2)
MCV RBC AUTO: 93.1 FL (ref 81–97)
MD: (no result)
MONOCYTES # BLD AUTO: 0.51 X10^3/UL (ref 0.2–0.8)
MONOCYTES NFR BLD AUTO: 4 % (ref 2–9)
NEUTROPHILS # BLD AUTO: 11.2 X10^3/UL (ref 1.8–6.8)
NEUTROPHILS NFR BLD AUTO: 91 % (ref 42–75)
O2 FLOW: 2 L/MIN
PLATELET # BLD AUTO: 353 X10^3/UL (ref 130–400)
PMV BLD AUTO: 6.7 FL (ref 7.4–10.4)
PROT SERPL-MCNC: 6.7 G/DL (ref 6.4–8.2)
PROTHROMBIN TIME: 53.9 SECONDS (ref 9.6–11.5)
RBC # BLD AUTO: 3.69 X10^6/UL (ref 4.38–5.82)

## 2018-03-06 RX ADMIN — NICOTINE SCH PATCH: 21 PATCH, EXTENDED RELEASE TRANSDERMAL at 15:00

## 2018-03-06 RX ADMIN — TAMSULOSIN HYDROCHLORIDE SCH MG: 0.4 CAPSULE ORAL at 09:00

## 2018-03-06 RX ADMIN — LORAZEPAM PRN MG: 2 INJECTION INTRAMUSCULAR; INTRAVENOUS at 00:16

## 2018-03-06 RX ADMIN — SODIUM CHLORIDE SCH MLS/HR: 0.9 INJECTION, SOLUTION INTRAVENOUS at 09:22

## 2018-03-06 RX ADMIN — TAZOBACTAM SODIUM AND PIPERACILLIN SODIUM SCH MLS/HR: 500; 4 INJECTION, SOLUTION INTRAVENOUS at 04:03

## 2018-03-06 RX ADMIN — LORAZEPAM PRN MG: 2 INJECTION INTRAMUSCULAR; INTRAVENOUS at 02:07

## 2018-03-06 RX ADMIN — DOXYCYCLINE HYCLATE SCH MG: 100 TABLET, FILM COATED ORAL at 09:00

## 2018-03-06 RX ADMIN — Medication SCH NOTE: at 09:00

## 2018-03-06 RX ADMIN — TAZOBACTAM SODIUM AND PIPERACILLIN SODIUM SCH MLS/HR: 500; 4 INJECTION, SOLUTION INTRAVENOUS at 22:13

## 2018-03-06 RX ADMIN — AMLODIPINE BESYLATE SCH MG: 5 TABLET ORAL at 09:00

## 2018-03-06 RX ADMIN — LEVETIRACETAM SCH MLS/HR: 100 INJECTION, SOLUTION, CONCENTRATE INTRAVENOUS at 19:46

## 2018-03-06 RX ADMIN — TAZOBACTAM SODIUM AND PIPERACILLIN SODIUM SCH MLS/HR: 500; 4 INJECTION, SOLUTION INTRAVENOUS at 10:33

## 2018-03-06 RX ADMIN — LEVETIRACETAM SCH MLS/HR: 100 INJECTION, SOLUTION, CONCENTRATE INTRAVENOUS at 09:22

## 2018-03-06 RX ADMIN — SODIUM CHLORIDE SCH MLS/HR: 0.9 INJECTION, SOLUTION INTRAVENOUS at 19:47

## 2018-03-06 RX ADMIN — VANCOMYCIN HYDROCHLORIDE SCH MLS/HR: 1 INJECTION, POWDER, LYOPHILIZED, FOR SOLUTION INTRAVENOUS at 14:39

## 2018-03-06 RX ADMIN — DOXYCYCLINE HYCLATE SCH MG: 100 TABLET, FILM COATED ORAL at 19:49

## 2018-03-06 RX ADMIN — LORAZEPAM PRN MG: 2 INJECTION INTRAMUSCULAR; INTRAVENOUS at 04:05

## 2018-03-06 RX ADMIN — TAZOBACTAM SODIUM AND PIPERACILLIN SODIUM SCH MLS/HR: 500; 4 INJECTION, SOLUTION INTRAVENOUS at 16:45

## 2018-03-06 RX ADMIN — POTASSIUM CHLORIDE SCH MLS/HR: 2 INJECTION, SOLUTION, CONCENTRATE INTRAVENOUS at 14:59

## 2018-03-07 VITALS — SYSTOLIC BLOOD PRESSURE: 110 MMHG | DIASTOLIC BLOOD PRESSURE: 64 MMHG

## 2018-03-07 VITALS — SYSTOLIC BLOOD PRESSURE: 98 MMHG | DIASTOLIC BLOOD PRESSURE: 62 MMHG

## 2018-03-07 VITALS — DIASTOLIC BLOOD PRESSURE: 64 MMHG | SYSTOLIC BLOOD PRESSURE: 103 MMHG

## 2018-03-07 LAB
ALBUMIN SERPL-MCNC: 2.9 G/DL (ref 3.4–5)
ALP SERPL-CCNC: 82 U/L (ref 45–117)
ALT SERPL-CCNC: 11 U/L (ref 12–78)
ANION GAP SERPL CALC-SCNC: 7 MMOL/L (ref 5–15)
BASOPHILS # BLD AUTO: 0.04 X10^3/UL (ref 0–0.1)
BASOPHILS NFR BLD AUTO: 0 % (ref 0–1)
BILIRUB SERPL-MCNC: 1.2 MG/DL (ref 0.2–1)
CALCIUM SERPL-MCNC: 8.3 MG/DL (ref 8.5–10.1)
CHLORIDE SERPL-SCNC: 111 MMOL/L (ref 98–107)
CREAT SERPL-MCNC: 0.99 MG/DL (ref 0.7–1.3)
EOSINOPHIL # BLD AUTO: 0.67 X10^3/UL (ref 0–0.4)
EOSINOPHIL NFR BLD AUTO: 6 % (ref 1–7)
ERYTHROCYTE [DISTWIDTH] IN BLOOD BY AUTOMATED COUNT: 15.4 % (ref 9.4–14.8)
INR PPP: 4.44 (ref 0.93–1.1)
LYMPHOCYTES # BLD AUTO: 0.66 X10^3/UL (ref 1–3.4)
LYMPHOCYTES NFR BLD AUTO: 6 % (ref 22–44)
MCH RBC QN AUTO: 31 PG (ref 27.5–34.5)
MCHC RBC AUTO-ENTMCNC: 33.2 G/DL (ref 33.2–36.2)
MCV RBC AUTO: 93.4 FL (ref 81–97)
MD: NO
MONOCYTES # BLD AUTO: 0.35 X10^3/UL (ref 0.2–0.8)
MONOCYTES NFR BLD AUTO: 3 % (ref 2–9)
NEUTROPHILS # BLD AUTO: 9.56 X10^3/UL (ref 1.8–6.8)
NEUTROPHILS NFR BLD AUTO: 85 % (ref 42–75)
PLATELET # BLD AUTO: 318 X10^3/UL (ref 130–400)
PMV BLD AUTO: 6.9 FL (ref 7.4–10.4)
PROT SERPL-MCNC: 6.3 G/DL (ref 6.4–8.2)
PROTHROMBIN TIME: 44.8 SECONDS (ref 9.6–11.5)
RBC # BLD AUTO: 3.4 X10^6/UL (ref 4.38–5.82)

## 2018-03-07 RX ADMIN — AMLODIPINE BESYLATE SCH MG: 5 TABLET ORAL at 08:34

## 2018-03-07 RX ADMIN — SODIUM CHLORIDE SCH MLS/HR: 0.9 INJECTION, SOLUTION INTRAVENOUS at 05:38

## 2018-03-07 RX ADMIN — LEVETIRACETAM SCH MLS/HR: 100 INJECTION, SOLUTION, CONCENTRATE INTRAVENOUS at 08:33

## 2018-03-07 RX ADMIN — TAMSULOSIN HYDROCHLORIDE SCH MG: 0.4 CAPSULE ORAL at 08:34

## 2018-03-07 RX ADMIN — LEVETIRACETAM SCH MLS/HR: 100 INJECTION, SOLUTION, CONCENTRATE INTRAVENOUS at 22:15

## 2018-03-07 RX ADMIN — TAZOBACTAM SODIUM AND PIPERACILLIN SODIUM SCH MLS/HR: 500; 4 INJECTION, SOLUTION INTRAVENOUS at 16:55

## 2018-03-07 RX ADMIN — TAZOBACTAM SODIUM AND PIPERACILLIN SODIUM SCH MLS/HR: 500; 4 INJECTION, SOLUTION INTRAVENOUS at 23:11

## 2018-03-07 RX ADMIN — Medication SCH NOTE: at 08:15

## 2018-03-07 RX ADMIN — DOXYCYCLINE HYCLATE SCH MG: 100 TABLET, FILM COATED ORAL at 21:29

## 2018-03-07 RX ADMIN — TAZOBACTAM SODIUM AND PIPERACILLIN SODIUM SCH MLS/HR: 500; 4 INJECTION, SOLUTION INTRAVENOUS at 10:46

## 2018-03-07 RX ADMIN — TAZOBACTAM SODIUM AND PIPERACILLIN SODIUM SCH MLS/HR: 500; 4 INJECTION, SOLUTION INTRAVENOUS at 04:40

## 2018-03-07 RX ADMIN — DOXYCYCLINE HYCLATE SCH MG: 100 TABLET, FILM COATED ORAL at 08:33

## 2018-03-07 RX ADMIN — NICOTINE SCH PATCH: 21 PATCH, EXTENDED RELEASE TRANSDERMAL at 16:55

## 2018-03-08 VITALS — SYSTOLIC BLOOD PRESSURE: 118 MMHG | DIASTOLIC BLOOD PRESSURE: 65 MMHG

## 2018-03-08 VITALS — SYSTOLIC BLOOD PRESSURE: 105 MMHG | DIASTOLIC BLOOD PRESSURE: 58 MMHG

## 2018-03-08 LAB
ALBUMIN SERPL-MCNC: 2.8 G/DL (ref 3.4–5)
ALP SERPL-CCNC: 74 U/L (ref 45–117)
ALT SERPL-CCNC: 12 U/L (ref 12–78)
ANION GAP SERPL CALC-SCNC: 7 MMOL/L (ref 5–15)
BASOPHILS # BLD AUTO: 0.04 X10^3/UL (ref 0–0.1)
BASOPHILS NFR BLD AUTO: 1 % (ref 0–1)
BILIRUB SERPL-MCNC: 0.6 MG/DL (ref 0.2–1)
CALCIUM SERPL-MCNC: 8.4 MG/DL (ref 8.5–10.1)
CHLORIDE SERPL-SCNC: 110 MMOL/L (ref 98–107)
CREAT SERPL-MCNC: 1.05 MG/DL (ref 0.7–1.3)
EOSINOPHIL # BLD AUTO: 0.78 X10^3/UL (ref 0–0.4)
EOSINOPHIL NFR BLD AUTO: 12 % (ref 1–7)
ERYTHROCYTE [DISTWIDTH] IN BLOOD BY AUTOMATED COUNT: 15.6 % (ref 9.4–14.8)
INR PPP: 2.62 (ref 0.93–1.1)
LYMPHOCYTES # BLD AUTO: 1.22 X10^3/UL (ref 1–3.4)
LYMPHOCYTES NFR BLD AUTO: 18 % (ref 22–44)
MCH RBC QN AUTO: 30.3 PG (ref 27.5–34.5)
MCHC RBC AUTO-ENTMCNC: 32.8 G/DL (ref 33.2–36.2)
MCV RBC AUTO: 92.4 FL (ref 81–97)
MD: NO
MONOCYTES # BLD AUTO: 0.46 X10^3/UL (ref 0.2–0.8)
MONOCYTES NFR BLD AUTO: 7 % (ref 2–9)
NEUTROPHILS # BLD AUTO: 4.17 X10^3/UL (ref 1.8–6.8)
NEUTROPHILS NFR BLD AUTO: 63 % (ref 42–75)
PLATELET # BLD AUTO: 332 X10^3/UL (ref 130–400)
PMV BLD AUTO: 7.1 FL (ref 7.4–10.4)
PROT SERPL-MCNC: 6.2 G/DL (ref 6.4–8.2)
PROTHROMBIN TIME: 26.7 SECONDS (ref 9.6–11.5)
RBC # BLD AUTO: 3.13 X10^6/UL (ref 4.38–5.82)

## 2018-03-08 RX ADMIN — TAMSULOSIN HYDROCHLORIDE SCH MG: 0.4 CAPSULE ORAL at 09:00

## 2018-03-08 RX ADMIN — DOXYCYCLINE HYCLATE SCH MG: 100 TABLET, FILM COATED ORAL at 09:00

## 2018-03-08 RX ADMIN — TAZOBACTAM SODIUM AND PIPERACILLIN SODIUM SCH MLS/HR: 500; 4 INJECTION, SOLUTION INTRAVENOUS at 05:09

## 2018-03-08 RX ADMIN — AMLODIPINE BESYLATE SCH MG: 5 TABLET ORAL at 09:00

## 2018-03-08 RX ADMIN — Medication SCH NOTE: at 09:00

## 2018-03-08 RX ADMIN — TAZOBACTAM SODIUM AND PIPERACILLIN SODIUM SCH MLS/HR: 500; 4 INJECTION, SOLUTION INTRAVENOUS at 11:00

## 2018-03-12 ENCOUNTER — ANTICOAGULATION VISIT (OUTPATIENT)
Dept: MEDICAL GROUP | Facility: PHYSICIAN GROUP | Age: 73
End: 2018-03-12
Payer: MEDICARE

## 2018-03-12 DIAGNOSIS — I26.01 CHRONIC SEPTIC PULMONARY EMBOLISM WITH ACUTE COR PULMONALE (HCC): ICD-10-CM

## 2018-03-12 DIAGNOSIS — I27.82 CHRONIC SEPTIC PULMONARY EMBOLISM WITH ACUTE COR PULMONALE (HCC): ICD-10-CM

## 2018-03-12 DIAGNOSIS — I82.401 ACUTE DEEP VEIN THROMBOSIS (DVT) OF RIGHT LOWER EXTREMITY, UNSPECIFIED VEIN (HCC): ICD-10-CM

## 2018-03-12 DIAGNOSIS — I48.0 PAROXYSMAL ATRIAL FIBRILLATION (HCC): ICD-10-CM

## 2018-03-12 LAB — INR PPP: 2.4 (ref 2–3.5)

## 2018-03-12 PROCEDURE — 99211 OFF/OP EST MAY X REQ PHY/QHP: CPT | Performed by: FAMILY MEDICINE

## 2018-03-12 PROCEDURE — 85610 PROTHROMBIN TIME: CPT | Performed by: FAMILY MEDICINE

## 2018-03-12 NOTE — PROGRESS NOTES
OP Anticoagulation Service Note    Date: 3/12/2018  There were no vitals filed for this visit.    Anticoagulation Summary  As of 3/12/2018    INR goal:   2.0-3.0   TTR:   7.5 % (2.9 mo)   Today's INR:   2.4   Maintenance plan:   5 mg (5 mg x 1) every day   Weekly total:   35 mg   Plan last modified:   Rai Ferrell, PharmD (3/12/2018)   Next INR check:   3/19/2018   Target end date:   Indefinite    Indications    Deep vein thrombosis (CMS-HCC) [I82.409] [I82.409]  Pulmonary embolism (CMS-HCC) [I26.99] [I26.99]  Atrial fibrillation (CMS-HCC) [I48.91] [I48.91]             Anticoagulation Episode Summary     [facility] check location[facility]   [facility]    Preferred lab:       Send INR reminders to:       Comments:         Anticoagulation Care Providers     Provider Role Specialty Phone number    Graciela Andersen M.D. Responsible Family Medicine [phone]    [facility] Responsible  [phone]        Anticoagulation Patient Findings      HPI:   Tj Bauman seen in clinic today, they are here today for a INR check on anticoagulation therapy with warfarin because they have a PMH of atrial fibrillation and DVTs and PE    The reason for today's visit is to prevent morbidity and mortality from a stroke or another blood clot  and to reduce the risk of bleeding while on a anticoagulant.     Reason for today's visit (per our collaborative practice policy) is because their last INR was 6.5  on  3/5/2018.   Intervention at the last visit:  Decreased the dose    Additional education provided today regarding reducing bleed risk and dietary constraints:  About diet, about Eliquis and his low TTR.     Any upcoming procedures:   none    Confirmed warfarin dosing regimen  Interval Changes with foods rich in vitamin K: No  Interval Changes in ETOH:   No  Interval Changes in smoking status:  No  Interval Changes in medication:  No   Cost restriction:  No    S/S of bleeding or bruising:  No  Signs/symptoms   thrombosis since the last appt:  No  Bleed risk is:  moderate,     3 vitals included with today's appt :No, declined today   (BP, HR, weight, ht, RR)     Assessment:   INR  therapeutic.     No change in dose needed today, they will need to continue with the same dose and diet to prevent adverse events while on a anticoagulant.     They have a TTR of 7.5  which is not at target (TTR target/goal is 100%) and requires close follow up to prevent a adverse event (the lower the TTR the higher risk of clots, strokes, or bleeding).     I sent in a Rx for Eliquis to the pharmacy. With his low TTR of 7.5% I recommended that he use another agent. He does not have a consistent diet and this is causing his INR to become too labile with warfarin. If the copay is reasonable he will get the rx and we will start it next week.        Plan:  Continue weekly warfarin dose as noted. He will not start Eliquis until next week on Monday assuming there are no issues with his copay or insurance.       Follow up:  Follow up appointment in 1 week(s)       Other info:  Pt educated to contact our clinic with any changes in medications or s/s of bleeding or thrombosis    CHEST guidelines recommend frequent INR monitoring at regular intervals (a few days up to a max of 12 weeks) to ensure they are on the proper dose of warfarin and not having any complications from therapy.  INRs can dramatically change over a short time period due to diet, medications, and medical conditions.

## 2018-03-19 ENCOUNTER — APPOINTMENT (OUTPATIENT)
Dept: MEDICAL GROUP | Facility: PHYSICIAN GROUP | Age: 73
End: 2018-03-19
Payer: MEDICARE

## 2018-04-02 ENCOUNTER — ANTICOAGULATION VISIT (OUTPATIENT)
Dept: MEDICAL GROUP | Facility: PHYSICIAN GROUP | Age: 73
End: 2018-04-02
Payer: MEDICARE

## 2018-04-02 DIAGNOSIS — I82.401 ACUTE DEEP VEIN THROMBOSIS (DVT) OF RIGHT LOWER EXTREMITY, UNSPECIFIED VEIN (HCC): ICD-10-CM

## 2018-04-02 DIAGNOSIS — I27.82 CHRONIC SEPTIC PULMONARY EMBOLISM WITH ACUTE COR PULMONALE (HCC): ICD-10-CM

## 2018-04-02 DIAGNOSIS — I26.01 CHRONIC SEPTIC PULMONARY EMBOLISM WITH ACUTE COR PULMONALE (HCC): ICD-10-CM

## 2018-04-02 DIAGNOSIS — I48.0 PAROXYSMAL ATRIAL FIBRILLATION (HCC): ICD-10-CM

## 2018-04-02 LAB — INR PPP: 1.4 (ref 2–3.5)

## 2018-04-02 PROCEDURE — 85610 PROTHROMBIN TIME: CPT | Performed by: FAMILY MEDICINE

## 2018-04-02 PROCEDURE — 99211 OFF/OP EST MAY X REQ PHY/QHP: CPT | Performed by: FAMILY MEDICINE

## 2018-04-02 NOTE — PROGRESS NOTES
OP Anticoagulation Service Note    Date: 4/2/2018  There were no vitals filed for this visit.    Anticoagulation Summary  As of 4/2/2018    INR goal:   2.0-3.0   TTR:   13.8 % (3.6 mo)   Today's INR:   1.4!   Maintenance plan:   10 mg (5 mg x 2) on Mon, Wed; 7.5 mg (5 mg x 1.5) all other days   Weekly total:   57.5 mg   Plan last modified:   Rai Ferrell, PharmD (4/2/2018)   Next INR check:   4/9/2018   Target end date:   Indefinite    Indications    Deep vein thrombosis (CMS-HCC) [I82.409] [I82.409]  Pulmonary embolism (CMS-HCC) [I26.99] [I26.99]  Atrial fibrillation (CMS-HCC) [I48.91] [I48.91]             Anticoagulation Episode Summary     INR check location:   [facility]    Preferred lab:       Send INR reminders to:       Comments:         Anticoagulation Care Providers     Provider Role Specialty Phone number    Graciela Andersen M.D. Responsible Family Medicine [phone]    [facility] Responsible  [phone]        Anticoagulation Patient Findings      HPI:   Tj Bauman seen in clinic today, they are here today for a INR check on anticoagulation therapy with warfarin because they have a pmh of DVTs and PEs    The reason for today's visit is to prevent morbidity and mortality from a blood clot  and to reduce the risk of bleeding while on a anticoagulant.     Reason for today's visit (per our collaborative practice policy) is because their last INR was 2.4  on  3/12/2018.   Intervention at the last visit:  None needed     Additional education provided today regarding reducing bleed risk and dietary constraints:  About diet    Any upcoming procedures:   none    Confirmed warfarin dosing regimen  Interval Changes with foods rich in vitamin K: No  Interval Changes in ETOH:   No  Interval Changes in smoking status:  No  Interval Changes in medication:  No   Cost restriction:  No    S/S of bleeding or bruising:  No  Signs/symptoms  thrombosis since the last appt:  No  Bleed  risk is:  moderate,     3 vitals included with today's appt :Yes  (BP, HR, weight, ht, RR)     Assessment:   INR  sub-therapeutic.     Possible reason(s) for INR today:   not sure, his INR is very labile, he missed a appt so he started to take 1.5 tablets daily. He cannot afford a DOAC and does not want to use lovenox today.     Intervention required today to prevent:    They are at a increased risk of clots because INR is below goal.      They have a TTR of 13.8  which is not at target (TTR target/goal is 100%) and requires close follow up to prevent a adverse event (the lower the TTR the higher risk of clots, strokes, or bleeding).       Plan:  Increase weekly warfarin dose as noted      Follow up:  Follow up appointment in 1 week(s)     Other info:  Pt educated to contact our clinic with any changes in medications or s/s of bleeding or thrombosis    CHEST guidelines recommend frequent INR monitoring at regular intervals (a few days up to a max of 12 weeks) to ensure they are on the proper dose of warfarin and not having any complications from therapy.  INRs can dramatically change over a short time period due to diet, medications, and medical conditions.

## 2018-04-09 ENCOUNTER — ANTICOAGULATION VISIT (OUTPATIENT)
Dept: MEDICAL GROUP | Facility: PHYSICIAN GROUP | Age: 73
End: 2018-04-09
Payer: MEDICARE

## 2018-04-09 DIAGNOSIS — I82.401 ACUTE DEEP VEIN THROMBOSIS (DVT) OF RIGHT LOWER EXTREMITY, UNSPECIFIED VEIN (HCC): ICD-10-CM

## 2018-04-09 LAB — INR PPP: 2 (ref 2–3.5)

## 2018-04-09 PROCEDURE — 85610 PROTHROMBIN TIME: CPT | Performed by: FAMILY MEDICINE

## 2018-04-09 NOTE — PROGRESS NOTES
OP Anticoagulation Service Note    Date: 4/9/2018  There were no vitals filed for this visit.    Anticoagulation Summary  As of 4/9/2018    INR goal:   2.0-3.0   TTR:   12.9 % (3.9 mo)   Today's INR:   2.0   Maintenance plan:   10 mg (5 mg x 2) on Mon, Wed, Fri; 7.5 mg (5 mg x 1.5) all other days   Weekly total:   60 mg   Plan last modified:   Rai Ferrell, PharmD (4/9/2018)   Next INR check:   4/16/2018   Target end date:   Indefinite    Indications    Deep vein thrombosis (CMS-HCC) [I82.409] [I82.409]  Pulmonary embolism (CMS-HCC) [I26.99] [I26.99]  Atrial fibrillation (CMS-HCC) [I48.91] [I48.91]             Anticoagulation Episode Summary     INR check location:   [facility]    Preferred lab:       Send INR reminders to:       Comments:         Anticoagulation Care Providers     Provider Role Specialty Phone number    Graciela Andersen M.D. Responsible Family Medicine [phone]    [facility] Responsible  [phone]        Anticoagulation Patient Findings      HPI:   Tj Bauman seen in clinic today, they are here today for a INR check on anticoagulation therapy with warfarin because they have a pmh of a DVT and a PE    The reason for today's visit is to prevent morbidity and mortality from a blood clot  and to reduce the risk of bleeding while on a anticoagulant.     Reason for today's visit (per our collaborative practice policy) is because their last INR was 1.4  on  4/2/2018.   Intervention at the last visit:  Increased his weekly dose    Additional education provided today regarding reducing bleed risk and dietary constraints:  About diet and warfarin and his low ttr    Any upcoming procedures:   none    Confirmed warfarin dosing regimen  Interval Changes with foods rich in vitamin K: No  Interval Changes in ETOH:   No  Interval Changes in smoking status:  No  Interval Changes in medication:  No   Cost restriction:  No    S/S of bleeding or bruising:  No  Signs/symptoms   thrombosis since the last appt:  No  Bleed risk is:  moderate,     3 vitals included with today's appt :No, he declined today   (BP, HR, weight, ht, RR)     Assessment:   INR  therapeutic.       No change in dose needed today, they will need to continue with the same dose and diet to prevent adverse events while on a anticoagulant.     They have a TTR of 12.9  which is not at target (TTR target/goal is 100%) and requires close follow up to prevent a adverse event (the lower the TTR the higher risk of clots, strokes, or bleeding).       Plan:  Continue weekly warfarin dose as noted      Follow up:  Follow up appointment in 1 week(s)       Other info:  Pt educated to contact our clinic with any changes in medications or s/s of bleeding or thrombosis    CHEST guidelines recommend frequent INR monitoring at regular intervals (a few days up to a max of 12 weeks) to ensure they are on the proper dose of warfarin and not having any complications from therapy.  INRs can dramatically change over a short time period due to diet, medications, and medical conditions.

## 2018-04-16 ENCOUNTER — ANTICOAGULATION VISIT (OUTPATIENT)
Dept: MEDICAL GROUP | Facility: PHYSICIAN GROUP | Age: 73
End: 2018-04-16
Payer: MEDICARE

## 2018-04-16 DIAGNOSIS — I82.401 ACUTE DEEP VEIN THROMBOSIS (DVT) OF RIGHT LOWER EXTREMITY, UNSPECIFIED VEIN (HCC): ICD-10-CM

## 2018-04-16 LAB — INR PPP: 2.2 (ref 2–3.5)

## 2018-04-16 PROCEDURE — 85610 PROTHROMBIN TIME: CPT | Performed by: FAMILY MEDICINE

## 2018-04-16 NOTE — PROGRESS NOTES
OP Anticoagulation Service Note    Date: 4/16/2018  There were no vitals filed for this visit.    Anticoagulation Summary  As of 4/16/2018    INR goal:   2.0-3.0   TTR:   17.9 % (4.1 mo)   Today's INR:   2.2   Maintenance plan:   10 mg (5 mg x 2) on Mon, Wed, Fri; 7.5 mg (5 mg x 1.5) all other days   Weekly total:   60 mg   Plan last modified:   Rai Ferrell, PharmD (4/9/2018)   Next INR check:   4/30/2018   Target end date:   Indefinite    Indications    Deep vein thrombosis (CMS-HCC) [I82.409] [I82.409]  Pulmonary embolism (CMS-HCC) [I26.99] [I26.99]  Atrial fibrillation (CMS-HCC) [I48.91] [I48.91]             Anticoagulation Episode Summary     INR check location:   [facility]    Preferred lab:       Send INR reminders to:       Comments:         Anticoagulation Care Providers     Provider Role Specialty Phone number    Graciela Andersen M.D. Responsible Family Medicine [phone]    [facility] Responsible  [phone]        Anticoagulation Patient Findings      HPI:   Tj Bauman seen in clinic today, they are here today for a INR check on anticoagulation therapy with warfarin because they have a pmh of DVTs and PE    The reason for today's visit is to prevent morbidity and mortality from a stroke  and to reduce the risk of bleeding while on a anticoagulant.     Reason for today's visit (per our collaborative practice policy) is because their last INR was 2.0  on  3/9/2018.   Intervention at the last visit:  No change     Additional education provided today regarding reducing bleed risk and dietary constraints:  About dosing and warfarin     Any upcoming procedures:   none    Confirmed warfarin dosing regimen  Interval Changes with foods rich in vitamin K: No  Interval Changes in ETOH:   No  Interval Changes in smoking status:  No  Interval Changes in medication:  No   Cost restriction:  No    S/S of bleeding or bruising:  No  Signs/symptoms  thrombosis since the last appt:   No  Bleed risk is:  moderate,     3 vitals included with today's appt :No, declined today  (BP, HR, weight, ht, RR)     Assessment:   INR  therapeutic.     No change in dose needed today, they will need to continue with the same dose and diet to prevent adverse events while on a anticoagulant.     They have a TTR of 17.9  which is not at target (TTR target/goal is 100%) and requires close follow up to prevent a adverse event (the lower the TTR the higher risk of clots, strokes, or bleeding).       Plan:  Continue weekly warfarin dose as noted      Follow up:  Follow up appointment in 2 week(s)       Other info:  Pt educated to contact our clinic with any changes in medications or s/s of bleeding or thrombosis    CHEST guidelines recommend frequent INR monitoring at regular intervals (a few days up to a max of 12 weeks) to ensure they are on the proper dose of warfarin and not having any complications from therapy.  INRs can dramatically change over a short time period due to diet, medications, and medical conditions.

## 2018-04-30 ENCOUNTER — ANTICOAGULATION VISIT (OUTPATIENT)
Dept: MEDICAL GROUP | Facility: PHYSICIAN GROUP | Age: 73
End: 2018-04-30
Payer: MEDICARE

## 2018-04-30 VITALS
WEIGHT: 187 LBS | SYSTOLIC BLOOD PRESSURE: 148 MMHG | BODY MASS INDEX: 24.67 KG/M2 | HEART RATE: 107 BPM | DIASTOLIC BLOOD PRESSURE: 90 MMHG

## 2018-04-30 DIAGNOSIS — Z79.01 CHRONIC ANTICOAGULATION: Primary | ICD-10-CM

## 2018-04-30 DIAGNOSIS — I82.401 ACUTE DEEP VEIN THROMBOSIS (DVT) OF RIGHT LOWER EXTREMITY, UNSPECIFIED VEIN (HCC): ICD-10-CM

## 2018-04-30 LAB — INR PPP: 3 (ref 2–3.5)

## 2018-04-30 PROCEDURE — 85610 PROTHROMBIN TIME: CPT | Performed by: FAMILY MEDICINE

## 2018-04-30 PROCEDURE — 99211 OFF/OP EST MAY X REQ PHY/QHP: CPT | Performed by: FAMILY MEDICINE

## 2018-04-30 NOTE — PROGRESS NOTES
OP Anticoagulation Service Note    Date: 4/30/2018  Vitals:    04/30/18 1143   BP: 148/90   Pulse: (!) 107   Weight: 84.8 kg (187 lb)       Anticoagulation Summary  As of 4/30/2018    INR goal:   2.0-3.0   TTR:   26.2 % (4.6 mo)   Today's INR:   3.0   Warfarin maintenance plan:   10 mg (5 mg x 2) on Mon, Wed, Fri; 7.5 mg (5 mg x 1.5) all other days   Weekly warfarin total:   60 mg   Plan last modified:   Rai Ferrell, PharmD (4/9/2018)   Next INR check:   5/14/2018   Target end date:   Indefinite    Indications    Deep vein thrombosis (CMS-HCC) [I82.409] [I82.409]  Pulmonary embolism (CMS-HCC) [I26.99] [I26.99]  Atrial fibrillation (CMS-HCC) [I48.91] [I48.91]             Anticoagulation Episode Summary     INR check location:   [facility]    Preferred lab:       Send INR reminders to:       Comments:         Anticoagulation Care Providers     Provider Role Specialty Phone number    Graciela Andersen M.D. Responsible Family Medicine [phone]    [facility] Responsible  [phone]        Anticoagulation Patient Findings      HPI:   Tj J CARLOS Bauman seen in clinic today, they are here today for a INR check on anticoagulation therapy with warfarin because they have atrial fib DVT and PE    The reason for today's visit is to prevent morbidity and mortality from a Stroke or blood clot  and to reduce the risk of bleeding while on a anticoagulant.     Reason for today's visit (per our collaborative practice policy) is because their last INR was 2.2  on  4/16/2018.   Intervention at the last visit:  none    Additional education provided today regarding reducing bleed risk and dietary constraints:  About diet    Any upcoming procedures:   none    Confirmed warfarin dosing regimen  Interval Changes with foods rich in vitamin K: No  Interval Changes in ETOH:   No  Interval Changes in smoking status:  No  Interval Changes in medication:  No   Cost restriction:  No    S/S of bleeding or  bruising:  No  Signs/symptoms  thrombosis since the last appt:  No  Bleed risk is:  moderate,     3 vitals included with today's appt :Yes  (BP, HR, weight, ht, RR)     Assessment:   INR  therapeutic.     No change in dose needed today, they will need to continue with the same dose and diet to prevent adverse events while on a anticoagulant. \\    bp is elevated but he is having some leg pain     They have a TTR of 26.2  which is not at target (TTR target/goal is 100%) and requires close follow up to prevent a adverse event (the lower the TTR the higher risk of clots, strokes, or bleeding).       Plan:  Continue weekly warfarin dose as noted      Follow up:  Follow up appointment in 2 week(s)       Other info:  Pt educated to contact our clinic with any changes in medications or s/s of bleeding or thrombosis    CHEST guidelines recommend frequent INR monitoring at regular intervals (a few days up to a max of 12 weeks) to ensure they are on the proper dose of warfarin and not having any complications from therapy.  INRs can dramatically change over a short time period due to diet, medications, and medical conditions.

## 2018-05-14 ENCOUNTER — ANTICOAGULATION VISIT (OUTPATIENT)
Dept: MEDICAL GROUP | Facility: PHYSICIAN GROUP | Age: 73
End: 2018-05-14
Payer: MEDICARE

## 2018-05-14 DIAGNOSIS — Z79.01 CHRONIC ANTICOAGULATION: Primary | ICD-10-CM

## 2018-05-14 LAB — INR PPP: 2.3 (ref 2–3.5)

## 2018-05-14 PROCEDURE — 99999 PR NO CHARGE: CPT | Performed by: FAMILY MEDICINE

## 2018-05-14 PROCEDURE — 85610 PROTHROMBIN TIME: CPT | Performed by: FAMILY MEDICINE

## 2018-05-14 NOTE — PROGRESS NOTES
OP Anticoagulation Service Note    Date: 5/14/2018  There were no vitals filed for this visit.    Anticoagulation Summary  As of 5/14/2018    INR goal:   2.0-3.0   TTR:   33.1 % (5 mo)   Today's INR:   2.3   Warfarin maintenance plan:   10 mg (5 mg x 2) on Mon, Wed, Fri; 7.5 mg (5 mg x 1.5) all other days   Weekly warfarin total:   60 mg   Plan last modified:   Rai Ferrell, PharmD (4/9/2018)   Next INR check:   6/4/2018   Target end date:   Indefinite    Indications    Deep vein thrombosis (CMS-HCC) [I82.409] [I82.409]  Pulmonary embolism (CMS-HCC) [I26.99] [I26.99]  Atrial fibrillation (CMS-HCC) [I48.91] [I48.91]  Chronic anticoagulation [Z79.01]             Anticoagulation Episode Summary     INR check location:   [facility]    Preferred lab:       Send INR reminders to:       Comments:         Anticoagulation Care Providers     Provider Role Specialty Phone number    Graciela Andersen M.D. Responsible Family Medicine [phone]    [facility] Responsible  [phone]        Anticoagulation Patient Findings      HPI:   Tj Bauman seen in clinic today, they are here today for a INR check on anticoagulation therapy with warfarin because they have a PMH of a DVT and PE    The reason for today's visit is to prevent morbidity and mortality from a blood clot  and to reduce the risk of bleeding while on a anticoagulant.     Reason for today's visit (per our collaborative practice policy) is because their last INR was 3.0  on  4/30  Intervention at the last visit:  no    Additional education provided today regarding reducing bleed risk and dietary constraints:  About diet    Any upcoming procedures:   none    Confirmed warfarin dosing regimen  Interval Changes with foods rich in vitamin K: No  Interval Changes in ETOH:   No  Interval Changes in smoking status:  No  Interval Changes in medication:  No   Cost restriction:  No    S/S of bleeding or bruising:  No  Signs/symptoms   thrombosis since the last appt:  No  Bleed risk is:  moderate,     3 vitals included with today's appt :No  (BP, HR, weight, ht, RR)     Assessment:   INR  therapeutic.     No change in dose needed today, they will need to continue with the same dose and diet to prevent adverse events while on a anticoagulant.     They have a TTR of 33.1  which is not at target (TTR target/goal is 100%) and requires close follow up to prevent a adverse event (the lower the TTR the higher risk of clots, strokes, or bleeding).       Plan:  Continue weekly warfarin dose as noted      Follow up:  Follow up appointment in 3 week(s)       Other info:  Pt educated to contact our clinic with any changes in medications or s/s of bleeding or thrombosis    CHEST guidelines recommend frequent INR monitoring at regular intervals (a few days up to a max of 12 weeks) to ensure they are on the proper dose of warfarin and not having any complications from therapy.  INRs can dramatically change over a short time period due to diet, medications, and medical conditions.

## 2018-06-04 ENCOUNTER — ANTICOAGULATION VISIT (OUTPATIENT)
Dept: MEDICAL GROUP | Facility: PHYSICIAN GROUP | Age: 73
End: 2018-06-04
Payer: MEDICARE

## 2018-06-04 DIAGNOSIS — Z79.01 CHRONIC ANTICOAGULATION: ICD-10-CM

## 2018-06-04 LAB — INR PPP: 1.7 (ref 2–3.5)

## 2018-06-04 PROCEDURE — 85610 PROTHROMBIN TIME: CPT | Performed by: FAMILY MEDICINE

## 2018-06-04 PROCEDURE — 99211 OFF/OP EST MAY X REQ PHY/QHP: CPT | Performed by: FAMILY MEDICINE

## 2018-06-04 NOTE — PROGRESS NOTES
OP Anticoagulation Service Note    Date: 6/4/2018  There were no vitals filed for this visit.    Anticoagulation Summary  As of 6/4/2018    INR goal:   2.0-3.0   TTR:   35.1 % (5.7 mo)   Today's INR:   1.7!   Warfarin maintenance plan:   10 mg (5 mg x 2) on Mon, Wed, Fri; 7.5 mg (5 mg x 1.5) all other days   Weekly warfarin total:   60 mg   Plan last modified:   Rai Ferrell, PharmD (4/9/2018)   Next INR check:   6/18/2018   Target end date:   Indefinite    Indications    Deep vein thrombosis (CMS-HCC) [I82.409] [I82.409]  Pulmonary embolism (CMS-HCC) [I26.99] [I26.99]  Atrial fibrillation (CMS-HCC) [I48.91] [I48.91]  Chronic anticoagulation [Z79.01]             Anticoagulation Episode Summary     INR check location:   [facility]    Preferred lab:       Send INR reminders to:       Comments:         Anticoagulation Care Providers     Provider Role Specialty Phone number    Graciela Andersen M.D. Responsible Family Medicine [phone]    [facility] Responsible  [phone]        Anticoagulation Patient Findings      HPI:   Tj Bauman seen in clinic today, they are here today for a INR check on anticoagulation therapy with warfarin because they have atrial fib and a pmh of dvt and PEs    The reason for today's visit is to prevent morbidity and mortality from a blood clot or stroke  and to reduce the risk of bleeding while on a anticoagulant.     Reason for today's visit (per our collaborative practice policy) is because their last INR was 2.3  on  5/14.   Intervention at the last visit:  none    Additional education provided today regarding reducing bleed risk and dietary constraints:  Diet     Any upcoming procedures:   none    Confirmed warfarin dosing regimen  Interval Changes with foods rich in vitamin K: No  Interval Changes in ETOH:   No  Interval Changes in smoking status:  No  Interval Changes in medication:  No   Cost restriction:  No    S/S of bleeding or bruising:   No  Signs/symptoms  thrombosis since the last appt:  No  Bleed risk is:  moderate,     3 vitals included with today's appt :Yes  (BP, HR, weight, ht, RR)     Assessment:   INR  sub-therapeutic.     Possible reason(s) for INR today:   might be diet    Intervention required today to prevent:    They are at a increased risk of clots because INR is below goal.    They have a TTR of 35.1  which is not at target (TTR target/goal is 100%) and requires close follow up to prevent a adverse event (the lower the TTR the higher risk of clots, strokes, or bleeding).       Plan:  10mg tomorrow then continue weekly warfarin dose as noted      Follow up:  Follow up appointment in 2 week(s)       Other info:  Pt educated to contact our clinic with any changes in medications or s/s of bleeding or thrombosis    CHEST guidelines recommend frequent INR monitoring at regular intervals (a few days up to a max of 12 weeks) to ensure they are on the proper dose of warfarin and not having any complications from therapy.  INRs can dramatically change over a short time period due to diet, medications, and medical conditions.

## 2018-06-18 ENCOUNTER — ANTICOAGULATION VISIT (OUTPATIENT)
Dept: MEDICAL GROUP | Facility: PHYSICIAN GROUP | Age: 73
End: 2018-06-18
Payer: MEDICARE

## 2018-06-18 DIAGNOSIS — Z79.01 CHRONIC ANTICOAGULATION: ICD-10-CM

## 2018-06-18 LAB — INR PPP: 3.2 (ref 2–3.5)

## 2018-06-18 PROCEDURE — 99211 OFF/OP EST MAY X REQ PHY/QHP: CPT | Performed by: FAMILY MEDICINE

## 2018-06-18 PROCEDURE — 85610 PROTHROMBIN TIME: CPT | Performed by: FAMILY MEDICINE

## 2018-06-18 NOTE — PROGRESS NOTES
OP Anticoagulation Service Note    Date: 6/18/2018  There were no vitals filed for this visit.    Anticoagulation Summary  As of 6/18/2018    INR goal:   2.0-3.0   TTR:   37.5 % (6.2 mo)   Today's INR:   3.2!   Warfarin maintenance plan:   10 mg (5 mg x 2) on Mon, Wed, Fri; 7.5 mg (5 mg x 1.5) all other days   Weekly warfarin total:   60 mg   Plan last modified:   Rai Ferrell, PharmD (4/9/2018)   Next INR check:   7/2/2018   Target end date:   Indefinite    Indications    Deep vein thrombosis (CMS-HCC) [I82.409] [I82.409]  Pulmonary embolism (CMS-HCC) [I26.99] [I26.99]  Atrial fibrillation (CMS-HCC) [I48.91] [I48.91]  Chronic anticoagulation [Z79.01]             Anticoagulation Episode Summary     INR check location:   [facility]    Preferred lab:       Send INR reminders to:       Comments:         Anticoagulation Care Providers     Provider Role Specialty Phone number    Graciela Andersen M.D. Responsible Family Medicine [phone]    [facility] Responsible  [phone]        Anticoagulation Patient Findings      HPI:   Tj Bauman seen in clinic today, they are here today for a INR check on anticoagulation therapy with warfarin because they have a PMH of a DVT     The reason for today's visit is to prevent morbidity and mortality from a blood clot  and to reduce the risk of bleeding while on a anticoagulant.     Additional education provided today regarding reducing bleed risk and dietary constraints:  About diet    Any upcoming procedures:   none    Confirmed warfarin dosing regimen  Interval Changes with foods rich in vitamin K: No  Interval Changes in ETOH:   No  Interval Changes in smoking status:  No  Interval Changes in medication:  No   Cost restriction:  No    S/S of bleeding or bruising:  No  Signs/symptoms  thrombosis since the last appt:  No  Bleed risk is:  moderate,     3 vitals included with today's appt :No  (BP, HR, weight, ht, RR)     Assessment:   INR   supra-therapeutic.        might be diet, but he does not want to change the dose today     They have a TTR of 37.5  which is not at target (TTR target/goal is 100%) and requires close follow up to prevent a adverse event (the lower the TTR the higher risk of clots, strokes, or bleeding).       Plan:  Continue weekly warfarin dose as noted      Follow up:  Follow up appointment in 2 week(s)       Other info:  Pt educated to contact our clinic with any changes in medications or s/s of bleeding or thrombosis    CHEST guidelines recommend frequent INR monitoring at regular intervals (a few days up to a max of 12 weeks) to ensure they are on the proper dose of warfarin and not having any complications from therapy.  INRs can dramatically change over a short time period due to diet, medications, and medical conditions.

## 2018-07-02 ENCOUNTER — ANTICOAGULATION VISIT (OUTPATIENT)
Dept: MEDICAL GROUP | Facility: PHYSICIAN GROUP | Age: 73
End: 2018-07-02
Payer: MEDICARE

## 2018-07-02 DIAGNOSIS — Z79.01 CHRONIC ANTICOAGULATION: Primary | ICD-10-CM

## 2018-07-02 LAB — INR PPP: 2.7 (ref 2–3.5)

## 2018-07-02 PROCEDURE — 85610 PROTHROMBIN TIME: CPT | Performed by: INTERNAL MEDICINE

## 2018-07-02 PROCEDURE — 99999 PR NO CHARGE: CPT | Performed by: INTERNAL MEDICINE

## 2018-07-02 NOTE — PROGRESS NOTES
OP Anticoagulation Service Note    Date: 7/2/2018  There were no vitals filed for this visit.    Anticoagulation Summary  As of 7/2/2018    INR goal:   2.0-3.0   TTR:   39.1 % (6.7 mo)   Today's INR:   2.7   Warfarin maintenance plan:   10 mg (5 mg x 2) on Mon, Wed, Fri; 7.5 mg (5 mg x 1.5) all other days   Weekly warfarin total:   60 mg   Plan last modified:   Rai Ferrell, PharmD (4/9/2018)   Next INR check:   7/23/2018   Target end date:   Indefinite    Indications    Deep vein thrombosis (CMS-HCC) [I82.409] [I82.409]  Pulmonary embolism (CMS-HCC) [I26.99] [I26.99]  Atrial fibrillation (CMS-HCC) [I48.91] [I48.91]  Chronic anticoagulation [Z79.01]             Anticoagulation Episode Summary     INR check location:   [facility]    Preferred lab:       Send INR reminders to:       Comments:         Anticoagulation Care Providers     Provider Role Specialty Phone number    Graciela Andersen M.D. Responsible Family Medicine [phone]    [facility] Responsible  [phone]        Anticoagulation Patient Findings      HPI:   Tj Bauman seen in clinic today, they are here today for a INR check on anticoagulation therapy with warfarin because they have a PMH of DVT    The reason for today's visit is to prevent morbidity and mortality from a blood clot  and to reduce the risk of bleeding while on a anticoagulant.     Additional education provided today regarding reducing bleed risk and dietary constraints:  About how vitamin K and foods work with warfarin and the bleeding risk on a anticoagulant     Any upcoming procedures:   none    Confirmed warfarin dosing regimen  Interval Changes with foods rich in vitamin K: No  Interval Changes in ETOH:   No  Interval Changes in smoking status:  No  Interval Changes in medication:  No   Cost restriction:  No    S/S of bleeding or bruising:  No  Signs/symptoms  thrombosis since the last appt:  No  Bleed risk is:  moderate,     3 vitals included  with today's appt :  (BP, HR, weight, ht, RR)     Assessment:   INR  therapeutic.        no change is needed today because INR is in range.      They have a TTR of 39.1  which is not at target (TTR target/goal is 100%) and requires close follow up to prevent a adverse event (the lower the TTR the higher risk of clots, strokes, or bleeding).       Plan:  Continue weekly warfarin dose as noted      Follow up:  Follow up appointment in 3 week(s)       Other info:  Pt educated to contact our clinic with any changes in medications or s/s of bleeding or thrombosis    CHEST guidelines recommend frequent INR monitoring at regular intervals (a few days up to a max of 12 weeks) to ensure they are on the proper dose of warfarin and not having any complications from therapy.  INRs can dramatically change over a short time period due to diet, medications, and medical conditions.

## 2018-07-17 ENCOUNTER — APPOINTMENT (RX ONLY)
Dept: URBAN - METROPOLITAN AREA CLINIC 4 | Facility: CLINIC | Age: 73
Setting detail: DERMATOLOGY
End: 2018-07-17

## 2018-07-17 DIAGNOSIS — L90.5 SCAR CONDITIONS AND FIBROSIS OF SKIN: ICD-10-CM

## 2018-07-17 DIAGNOSIS — Z85.828 PERSONAL HISTORY OF OTHER MALIGNANT NEOPLASM OF SKIN: ICD-10-CM

## 2018-07-17 DIAGNOSIS — L82.1 OTHER SEBORRHEIC KERATOSIS: ICD-10-CM

## 2018-07-17 DIAGNOSIS — L57.0 ACTINIC KERATOSIS: ICD-10-CM

## 2018-07-17 DIAGNOSIS — D22 MELANOCYTIC NEVI: ICD-10-CM

## 2018-07-17 DIAGNOSIS — L81.4 OTHER MELANIN HYPERPIGMENTATION: ICD-10-CM

## 2018-07-17 DIAGNOSIS — L57.8 OTHER SKIN CHANGES DUE TO CHRONIC EXPOSURE TO NONIONIZING RADIATION: ICD-10-CM

## 2018-07-17 DIAGNOSIS — D18.0 HEMANGIOMA: ICD-10-CM

## 2018-07-17 DIAGNOSIS — L82.0 INFLAMED SEBORRHEIC KERATOSIS: ICD-10-CM

## 2018-07-17 PROBLEM — D22.5 MELANOCYTIC NEVI OF TRUNK: Status: ACTIVE | Noted: 2018-07-17

## 2018-07-17 PROBLEM — D18.01 HEMANGIOMA OF SKIN AND SUBCUTANEOUS TISSUE: Status: ACTIVE | Noted: 2018-07-17

## 2018-07-17 PROCEDURE — 17000 DESTRUCT PREMALG LESION: CPT | Mod: 59

## 2018-07-17 PROCEDURE — ? OBSERVATION

## 2018-07-17 PROCEDURE — 99213 OFFICE O/P EST LOW 20 MIN: CPT | Mod: 25

## 2018-07-17 PROCEDURE — ? COUNSELING

## 2018-07-17 PROCEDURE — ? LIQUID NITROGEN

## 2018-07-17 PROCEDURE — 17110 DESTRUCTION B9 LES UP TO 14: CPT

## 2018-07-17 PROCEDURE — 17003 DESTRUCT PREMALG LES 2-14: CPT

## 2018-07-17 ASSESSMENT — LOCATION SIMPLE DESCRIPTION DERM
LOCATION SIMPLE: RIGHT ANTERIOR NECK
LOCATION SIMPLE: LEFT UPPER BACK
LOCATION SIMPLE: RIGHT UPPER BACK
LOCATION SIMPLE: RIGHT PRETIBIAL REGION
LOCATION SIMPLE: RIGHT CHEEK
LOCATION SIMPLE: LEFT HAND
LOCATION SIMPLE: RIGHT EAR
LOCATION SIMPLE: RIGHT UPPER ARM
LOCATION SIMPLE: NOSE
LOCATION SIMPLE: RIGHT HAND
LOCATION SIMPLE: LEFT PRETIBIAL REGION
LOCATION SIMPLE: SCALP
LOCATION SIMPLE: LEFT OCCIPITAL SCALP

## 2018-07-17 ASSESSMENT — LOCATION DETAILED DESCRIPTION DERM
LOCATION DETAILED: RIGHT INFERIOR ANTERIOR NECK
LOCATION DETAILED: LEFT RADIAL DORSAL HAND
LOCATION DETAILED: NASAL TIP
LOCATION DETAILED: RIGHT LATERAL MALAR CHEEK
LOCATION DETAILED: RIGHT PROXIMAL PRETIBIAL REGION
LOCATION DETAILED: RIGHT PROXIMAL LATERAL POSTERIOR UPPER ARM
LOCATION DETAILED: LEFT SUPERIOR PARIETAL SCALP
LOCATION DETAILED: RIGHT SUPERIOR UPPER BACK
LOCATION DETAILED: LEFT SUPERIOR OCCIPITAL SCALP
LOCATION DETAILED: NASAL DORSUM
LOCATION DETAILED: LEFT PROXIMAL PRETIBIAL REGION
LOCATION DETAILED: RIGHT SUPERIOR PARIETAL SCALP
LOCATION DETAILED: LEFT SUPERIOR UPPER BACK
LOCATION DETAILED: RIGHT RADIAL DORSAL HAND
LOCATION DETAILED: RIGHT INFERIOR MEDIAL UPPER BACK
LOCATION DETAILED: RIGHT CRUS OF HELIX
LOCATION DETAILED: RIGHT CENTRAL MALAR CHEEK
LOCATION DETAILED: RIGHT INFERIOR CENTRAL MALAR CHEEK

## 2018-07-17 ASSESSMENT — LOCATION ZONE DERM
LOCATION ZONE: EAR
LOCATION ZONE: NOSE
LOCATION ZONE: LEG
LOCATION ZONE: SCALP
LOCATION ZONE: FACE
LOCATION ZONE: HAND
LOCATION ZONE: TRUNK
LOCATION ZONE: ARM
LOCATION ZONE: NECK

## 2018-07-17 NOTE — PROCEDURE: LIQUID NITROGEN
Consent: The patient's consent was obtained including but not limited to risks of crusting, scabbing, blistering, scarring, darker or lighter pigmentary change, recurrence, incomplete removal and infection.
Aperture Size (Optional): C
Render Post-Care Instructions In Note?: no
Number Of Freeze-Thaw Cycles: 2 freeze-thaw cycles
Post-Care Instructions: I reviewed with the patient in detail post-care instructions. Patient is to wear sunprotection, and avoid picking at any of the treated lesions. Pt may apply Vaseline to crusted or scabbing areas.
Detail Level: Simple
Duration Of Freeze Thaw-Cycle (Seconds): 3
Medical Necessity Clause: This procedure was medically necessary because the lesions that were treated were:
Detail Level: Detailed
Medical Necessity Information: It is in your best interest to select a reason for this procedure from the list below. All of these items fulfill various CMS LCD requirements except the new and changing color options.
Number Of Freeze-Thaw Cycles: 1 freeze-thaw cycle

## 2018-07-23 ENCOUNTER — ANTICOAGULATION VISIT (OUTPATIENT)
Dept: MEDICAL GROUP | Facility: PHYSICIAN GROUP | Age: 73
End: 2018-07-23
Payer: MEDICARE

## 2018-07-23 DIAGNOSIS — Z79.01 CHRONIC ANTICOAGULATION: Primary | ICD-10-CM

## 2018-07-23 LAB — INR PPP: 2.8 (ref 2–3.5)

## 2018-07-23 PROCEDURE — 85610 PROTHROMBIN TIME: CPT | Performed by: FAMILY MEDICINE

## 2018-07-23 PROCEDURE — 99999 PR NO CHARGE: CPT | Performed by: FAMILY MEDICINE

## 2018-07-23 NOTE — PROGRESS NOTES
OP Anticoagulation Service Note    Date: 7/23/2018  There were no vitals filed for this visit.    Anticoagulation Summary  As of 7/23/2018    INR goal:   2.0-3.0   TTR:   44.8 % (7.4 mo)   Today's INR:   2.8   Warfarin maintenance plan:   10 mg (5 mg x 2) on Mon, Wed, Fri; 7.5 mg (5 mg x 1.5) all other days   Weekly warfarin total:   60 mg   Plan last modified:   Rai Ferrell, PharmD (4/9/2018)   Next INR check:   8/13/2018   Target end date:   Indefinite    Indications    Deep vein thrombosis (CMS-HCC) [I82.409] [I82.409]  Pulmonary embolism (CMS-HCC) [I26.99] [I26.99]  Atrial fibrillation (CMS-HCC) [I48.91] [I48.91]  Chronic anticoagulation [Z79.01]             Anticoagulation Episode Summary     INR check location:   [facility]    Preferred lab:       Send INR reminders to:       Comments:         Anticoagulation Care Providers     Provider Role Specialty Phone number    Graciela Andersen M.D. Responsible Family Medicine [phone]    [facility] Responsible  [phone]        Anticoagulation Patient Findings      HPI:   Tj Bauman seen in clinic today, they are here today for a INR check on anticoagulation therapy with warfarin because they have a PMH of DVT    The reason for today's visit is to prevent morbidity and mortality from a blood clot  and to reduce the risk of bleeding while on a anticoagulant.     Additional education provided today regarding reducing bleed risk and dietary constraints:  About how vitamin K and foods work with warfarin and the bleeding risk on a anticoagulant     Any upcoming procedures:   none    Confirmed warfarin dosing regimen  Interval Changes with foods rich in vitamin K: No  Interval Changes in ETOH:   No  Interval Changes in smoking status:  No  Interval Changes in medication:  No   Cost restriction:  No    S/S of bleeding or bruising:  No  Signs/symptoms  thrombosis since the last appt:  No  Bleed risk is:  moderate,     3 vitals  included with today's appt : declined bp today   (BP, HR, weight, ht, RR)     Assessment:   INR  therapeutic.        no change is needed today because INR is in range.      They have a TTR of 44.8  which is not at target (TTR target/goal is 100%) and requires close follow up to prevent a adverse event (the lower the TTR the higher risk of clots, strokes, or bleeding).       Plan:  Continue weekly warfarin dose as noted      Follow up:  Follow up appointment in 3 week(s)       Other info:  Pt educated to contact our clinic with any changes in medications or s/s of bleeding or thrombosis    CHEST guidelines recommend frequent INR monitoring at regular intervals (a few days up to a max of 12 weeks) to ensure they are on the proper dose of warfarin and not having any complications from therapy.  INRs can dramatically change over a short time period due to diet, medications, and medical conditions.

## 2018-08-13 ENCOUNTER — ANTICOAGULATION VISIT (OUTPATIENT)
Dept: MEDICAL GROUP | Facility: PHYSICIAN GROUP | Age: 73
End: 2018-08-13
Payer: MEDICARE

## 2018-08-13 DIAGNOSIS — Z79.01 CHRONIC ANTICOAGULATION: ICD-10-CM

## 2018-08-13 LAB — INR PPP: 1.8 (ref 2–3.5)

## 2018-08-13 PROCEDURE — 99211 OFF/OP EST MAY X REQ PHY/QHP: CPT | Performed by: FAMILY MEDICINE

## 2018-08-13 PROCEDURE — 85610 PROTHROMBIN TIME: CPT | Performed by: FAMILY MEDICINE

## 2018-08-13 NOTE — PROGRESS NOTES
OP Anticoagulation Service Note    Date: 8/13/2018  There were no vitals filed for this visit.    Anticoagulation Summary  As of 8/13/2018    INR goal:   2.0-3.0   TTR:   47.9 % (8.1 mo)   Today's INR:   1.8!   Warfarin maintenance plan:   10 mg (5 mg x 2) on Mon, Wed, Fri; 7.5 mg (5 mg x 1.5) all other days   Weekly warfarin total:   60 mg   Plan last modified:   Rai Ferrell, PharmD (4/9/2018)   Next INR check:   8/27/2018   Target end date:   Indefinite    Indications    Deep vein thrombosis (CMS-HCC) [I82.409] [I82.409]  Pulmonary embolism (CMS-HCC) [I26.99] [I26.99]  Atrial fibrillation (CMS-HCC) [I48.91] [I48.91]  Chronic anticoagulation [Z79.01]             Anticoagulation Episode Summary     INR check location:   [facility]    Preferred lab:       Send INR reminders to:       Comments:         Anticoagulation Care Providers     Provider Role Specialty Phone number    Graciela Andersen M.D. Responsible Family Medicine [phone]    [facility] Responsible  [phone]        Anticoagulation Patient Findings      HPI:   Tj Bauman seen in clinic today, they are here today for a INR check on anticoagulation therapy with warfarin because they have a PMH OF A DVT and current atrial fib    The reason for today's visit is to prevent morbidity and mortality from a stroke  and to reduce the risk of bleeding while on a anticoagulant.     Additional education provided today regarding reducing bleed risk and dietary constraints:  About how vitamin K and foods work with warfarin and the bleeding risk on a anticoagulant     Any upcoming procedures:   none    Confirmed warfarin dosing regimen  Interval Changes with foods rich in vitamin K: No  Interval Changes in ETOH:   No  Interval Changes in smoking status:  No  Interval Changes in medication:  No   Cost restriction:  No    S/S of bleeding or bruising:  No  Signs/symptoms  thrombosis since the last appt:  No  Bleed risk is:   moderate,     3 vitals included with today's appt :  (BP, HR, weight, ht, RR)     Assessment:   INR  sub-therapeutic.        a change is needed today because INR is out of range.  He might have missed a dose Friday     They have a TTR of 47.9  which is not at target (TTR target/goal is 100%) and requires close follow up to prevent a adverse event (the lower the TTR the higher risk of clots, strokes, or bleeding).       Plan:  .    Follow up:  Follow up appointment in 2 week(s)       Other info:  Pt educated to contact our clinic with any changes in medications or s/s of bleeding or thrombosis    CHEST guidelines recommend frequent INR monitoring at regular intervals (a few days up to a max of 12 weeks) to ensure they are on the proper dose of warfarin and not having any complications from therapy.  INRs can dramatically change over a short time period due to diet, medications, and medical conditions.

## 2018-08-27 ENCOUNTER — ANTICOAGULATION VISIT (OUTPATIENT)
Dept: MEDICAL GROUP | Facility: PHYSICIAN GROUP | Age: 73
End: 2018-08-27
Payer: MEDICARE

## 2018-08-27 DIAGNOSIS — Z79.01 CHRONIC ANTICOAGULATION: ICD-10-CM

## 2018-08-27 DIAGNOSIS — I48.91 ATRIAL FIBRILLATION, UNSPECIFIED TYPE (HCC): ICD-10-CM

## 2018-08-27 LAB — INR PPP: 3.1 (ref 2–3.5)

## 2018-08-27 PROCEDURE — 99211 OFF/OP EST MAY X REQ PHY/QHP: CPT | Performed by: FAMILY MEDICINE

## 2018-08-27 PROCEDURE — 85610 PROTHROMBIN TIME: CPT | Performed by: FAMILY MEDICINE

## 2018-08-27 NOTE — PROGRESS NOTES
Anticoagulation Summary  As of 8/27/2018    INR goal:   2.0-3.0   TTR:   --   Today's INR:   3.1!   Warfarin maintenance plan:   10 mg (5 mg x 2) on Mon, Wed, Fri; 7.5 mg (5 mg x 1.5) all other days   Weekly warfarin total:   60 mg   Plan last modified:   Rai Ferrell, PharmD (4/9/2018)   Next INR check:   9/10/2018   Target end date:   Indefinite    Indications    Deep vein thrombosis (CMS-HCC) [I82.409] [I82.409]  Pulmonary embolism (CMS-HCC) [I26.99] [I26.99]  Atrial fibrillation (CMS-HCC) [I48.91] [I48.91]  Chronic anticoagulation [Z79.01]             Anticoagulation Episode Summary     INR check location:   [facility]    Preferred lab:       Send INR reminders to:       Comments:         Anticoagulation Care Providers     Provider Role Specialty Phone number    Graciela Andersen M.D. Responsible Family Medicine [phone]    [facility] Responsible  [phone]        Anticoagulation Patient Findings    HPI:  Nolan presents for anticoagulation management. He takes warfarin for a history of atrial fib, DVT, and PE. No current signs of bleeding or thrombosis. No interval medication or diet changes.     A:  Supratherapeutic INR    P:  Nolan has already taken warfarin today. Decrease warfarin dose tomorrow to 5 mg, then resume previously prescribed regimen. Follow up INR in two weeks.    Mao Keen, PharmD

## 2018-09-10 ENCOUNTER — ANTICOAGULATION VISIT (OUTPATIENT)
Dept: MEDICAL GROUP | Facility: PHYSICIAN GROUP | Age: 73
End: 2018-09-10
Payer: MEDICARE

## 2018-09-10 DIAGNOSIS — Z79.01 CHRONIC ANTICOAGULATION: ICD-10-CM

## 2018-09-10 LAB — INR PPP: 1.7 (ref 2–3.5)

## 2018-09-10 PROCEDURE — 85610 PROTHROMBIN TIME: CPT | Performed by: FAMILY MEDICINE

## 2018-09-10 PROCEDURE — 99211 OFF/OP EST MAY X REQ PHY/QHP: CPT | Performed by: FAMILY MEDICINE

## 2018-09-10 NOTE — PROGRESS NOTES
OP Anticoagulation Service Note    Date: 9/10/2018  There were no vitals filed for this visit.    Anticoagulation Summary  As of 9/10/2018    INR goal:   2.0-3.0   TTR:   50.6 % (9 mo)   Today's INR:   1.7!   Warfarin maintenance plan:   10 mg (5 mg x 2) on Mon, Wed, Fri; 7.5 mg (5 mg x 1.5) all other days   Weekly warfarin total:   60 mg   Plan last modified:   Rai Ferrell, PharmD (4/9/2018)   Next INR check:   9/24/2018   Target end date:   Indefinite    Indications    Deep vein thrombosis (CMS-HCC) [I82.409] [I82.409]  Pulmonary embolism (CMS-HCC) [I26.99] [I26.99]  Atrial fibrillation (CMS-HCC) [I48.91] [I48.91]  Chronic anticoagulation [Z79.01]             Anticoagulation Episode Summary     INR check location:   [facility]    Preferred lab:       Send INR reminders to:       Comments:         Anticoagulation Care Providers     Provider Role Specialty Phone number    Graciela Andersen M.D. Responsible Family Medicine [phone]    [facility] Responsible  [phone]        Anticoagulation Patient Findings      HPI:   Tj Bauman seen in clinic today, they are here today for a INR check on anticoagulation therapy with warfarin because they have a PMH of a DVT and PE    The reason for today's visit is to prevent morbidity and mortality from a blood clot  and to reduce the risk of bleeding while on a anticoagulant.     Additional education provided today regarding reducing bleed risk and dietary constraints:  About how vitamin K and foods work with warfarin and the bleeding risk on a anticoagulant     Any upcoming procedures:   none    Confirmed warfarin dosing regimen  Interval Changes with foods rich in vitamin K: No  Interval Changes in ETOH:   No  Interval Changes in smoking status:  No  Interval Changes in medication:  No   Cost restriction:  No    S/S of bleeding or bruising:  No  Signs/symptoms  thrombosis since the last appt:  No  Bleed risk is:  moderate,     3  vitals included with today's appt : declined vitals today   (BP, HR, weight, ht, RR)     Assessment:   INR  sub-therapeutic.     a change is needed today because INR is out of  range.      They have a TTR of 50.6  which is not at target (TTR target/goal is 100%) and requires close follow up to prevent a adverse event (the lower the TTR the higher risk of clots, strokes, or bleeding).       Plan:  10 mg tomorrow then continue weekly warfarin dose as noted      Follow up:  Follow up appointment in 2 week(s)       Other info:  Pt educated to contact our clinic with any changes in medications or s/s of bleeding or thrombosis    CHEST guidelines recommend frequent INR monitoring at regular intervals (a few days up to a max of 12 weeks) to ensure they are on the proper dose of warfarin and not having any complications from therapy.  INRs can dramatically change over a short time period due to diet, medications, and medical conditions.

## 2018-09-24 ENCOUNTER — ANTICOAGULATION VISIT (OUTPATIENT)
Dept: MEDICAL GROUP | Facility: PHYSICIAN GROUP | Age: 73
End: 2018-09-24
Payer: MEDICARE

## 2018-09-24 DIAGNOSIS — Z79.01 CHRONIC ANTICOAGULATION: ICD-10-CM

## 2018-09-24 LAB — INR PPP: 2.8 (ref 2–3.5)

## 2018-09-24 PROCEDURE — 99999 PR NO CHARGE: CPT | Performed by: FAMILY MEDICINE

## 2018-09-24 PROCEDURE — 85610 PROTHROMBIN TIME: CPT | Performed by: FAMILY MEDICINE

## 2018-09-24 NOTE — PROGRESS NOTES
OP Anticoagulation Service Note    Date: 9/24/2018  There were no vitals filed for this visit.    Anticoagulation Summary  As of 9/24/2018    INR goal:   2.0-3.0   TTR:   51.7 % (9.5 mo)   Today's INR:   2.8   Warfarin maintenance plan:   10 mg (5 mg x 2) on Mon, Wed, Fri; 7.5 mg (5 mg x 1.5) all other days   Weekly warfarin total:   60 mg   Plan last modified:   Rai Ferrell, PharmD (4/9/2018)   Next INR check:   10/15/2018   Target end date:   Indefinite    Indications    Deep vein thrombosis (CMS-HCC) [I82.409] [I82.409]  Pulmonary embolism (CMS-HCC) [I26.99] [I26.99]  Atrial fibrillation (CMS-HCC) [I48.91] [I48.91]  Chronic anticoagulation [Z79.01]             Anticoagulation Episode Summary     INR check location:   [facility]    Preferred lab:       Send INR reminders to:       Comments:         Anticoagulation Care Providers     Provider Role Specialty Phone number    Graciela Andersen M.D. Responsible Family Medicine [phone]    [facility] Responsible  [phone]        Anticoagulation Patient Findings      HPI:   Tj Bauman seen in clinic today, they are here today for a INR check on anticoagulation therapy with warfarin because they have a past medical history of a deep vein thrombosis, pulmonary emboli and atrial fibrillation.    The reason for today's visit is to prevent morbidity and mortality from a another blood clot or stroke and to reduce the risk of bleeding while on a anticoagulant.     Additional education provided today regarding reducing bleed risk and dietary constraints:  About how vitamin K and foods work with warfarin and the bleeding risk on a anticoagulant     Any upcoming procedures:   none    Confirmed warfarin dosing regimen  Interval Changes with foods rich in vitamin K: No  Interval Changes in ETOH:   No  Interval Changes in smoking status:  No  Interval Changes in medication:  No   Cost restriction:  No    S/S of bleeding or bruising:   No  Signs/symptoms  thrombosis since the last appt:  No  Bleed risk is:  moderate,     3 vitals included with today's appt :  (BP, HR, weight, ht, RR)     Assessment:   INR  therapeutic.        no change is needed today because INR is in range.      They have a TTR of 51.7  which is not at target (TTR target/goal is 100%) and requires close follow up to prevent a adverse event (the lower the TTR the higher risk of clots, strokes, or bleeding).       Plan:  Continue weekly warfarin dose as noted      Follow up:  Follow up appointment in 3 week(s)       Other info:  Pt educated to contact our clinic with any changes in medications or s/s of bleeding or thrombosis    CHEST guidelines recommend frequent INR monitoring at regular intervals (a few days up to a max of 12 weeks) to ensure they are on the proper dose of warfarin and not having any complications from therapy.  INRs can dramatically change over a short time period due to diet, medications, and medical conditions.

## 2018-10-15 ENCOUNTER — ANTICOAGULATION VISIT (OUTPATIENT)
Dept: MEDICAL GROUP | Facility: PHYSICIAN GROUP | Age: 73
End: 2018-10-15
Payer: MEDICARE

## 2018-10-15 DIAGNOSIS — Z79.01 CHRONIC ANTICOAGULATION: ICD-10-CM

## 2018-10-15 LAB — INR PPP: 3.8 (ref 2–3.5)

## 2018-10-15 PROCEDURE — 85610 PROTHROMBIN TIME: CPT | Performed by: FAMILY MEDICINE

## 2018-10-15 PROCEDURE — 99211 OFF/OP EST MAY X REQ PHY/QHP: CPT | Performed by: FAMILY MEDICINE

## 2018-10-15 NOTE — PROGRESS NOTES
OP Anticoagulation Service Note    Date: 10/15/2018  There were no vitals filed for this visit.    Anticoagulation Summary  As of 10/15/2018    INR goal:   2.0-3.0   TTR:   49.5 % (10.2 mo)   Today's INR:   3.8!   Warfarin maintenance plan:   10 mg (5 mg x 2) on Mon, Wed, Fri; 7.5 mg (5 mg x 1.5) all other days   Weekly warfarin total:   60 mg   Plan last modified:   Rai Ferrell, PharmD (4/9/2018)   Next INR check:   10/29/2018   Target end date:   Indefinite    Indications    Deep vein thrombosis (CMS-HCC) [I82.409] [I82.409]  Pulmonary embolism (CMS-HCC) [I26.99] [I26.99]  Atrial fibrillation (CMS-HCC) [I48.91] [I48.91]  Chronic anticoagulation [Z79.01]             Anticoagulation Episode Summary     INR check location:   [facility]    Preferred lab:       Send INR reminders to:       Comments:         Anticoagulation Care Providers     Provider Role Specialty Phone number    Graciela Andersen M.D. Responsible Family Medicine [phone]    [facility] Responsible  [phone]        Anticoagulation Patient Findings      HPI:   Tj Bauman seen in clinic today, they are here today for a INR check on anticoagulation therapy with warfarin because they have DVT     The reason for today's visit is to prevent morbidity and mortality from a blood clot  and to reduce the risk of bleeding while on a anticoagulant.     Additional education provided today regarding reducing bleed risk and dietary constraints:  About how vitamin K and foods work with warfarin and the bleeding risk on a anticoagulant     Any upcoming procedures:   none    Confirmed warfarin dosing regimen  Interval Changes with foods rich in vitamin K: No  Interval Changes in ETOH:   No  Interval Changes in smoking status:  No  Interval Changes in medication:  No   Cost restriction:  No    S/S of bleeding or bruising:  No  Signs/symptoms  thrombosis since the last appt:  No  Bleed risk is:  moderate,     3 vitals included  with today's appt :  (BP, HR, weight, ht, RR)     Assessment:   INR  supra-therapeutic.        a change is needed today because INR is out of range.      They have a TTR of 49.5  which is not at target (TTR target/goal is 100%) and requires close follow up to prevent a adverse event (the lower the TTR the higher risk of clots, strokes, or bleeding).       Plan:  Hold tomorrow then Continue weekly warfarin dose as noted    Follow up:  Follow up appointment in 3 week(s)       Other info:  Pt educated to contact our clinic with any changes in medications or s/s of bleeding or thrombosis    CHEST guidelines recommend frequent INR monitoring at regular intervals (a few days up to a max of 12 weeks) to ensure they are on the proper dose of warfarin and not having any complications from therapy.  INRs can dramatically change over a short time period due to diet, medications, and medical conditions.

## 2018-10-29 ENCOUNTER — ANTICOAGULATION VISIT (OUTPATIENT)
Dept: MEDICAL GROUP | Facility: PHYSICIAN GROUP | Age: 73
End: 2018-10-29
Payer: MEDICARE

## 2018-10-29 DIAGNOSIS — Z79.01 CHRONIC ANTICOAGULATION: ICD-10-CM

## 2018-10-29 LAB — INR PPP: 2.2 (ref 2–3.5)

## 2018-10-29 PROCEDURE — 99999 PR NO CHARGE: CPT | Performed by: FAMILY MEDICINE

## 2018-10-29 PROCEDURE — 85610 PROTHROMBIN TIME: CPT | Performed by: FAMILY MEDICINE

## 2018-10-29 NOTE — PROGRESS NOTES
OP Anticoagulation Service Note    Date: 10/29/2018  There were no vitals filed for this visit.    Anticoagulation Summary  As of 10/29/2018    INR goal:   2.0-3.0   TTR:   49.5 % (10.6 mo)   Today's INR:   2.2   Warfarin maintenance plan:   10 mg (5 mg x 2) on Mon, Wed, Fri; 7.5 mg (5 mg x 1.5) all other days   Weekly warfarin total:   60 mg   Plan last modified:   Rai Ferrell, PharmD (4/9/2018)   Next INR check:   11/19/2018   Target end date:   Indefinite    Indications    Deep vein thrombosis (CMS-HCC) [I82.409] [I82.409]  Pulmonary embolism (CMS-HCC) [I26.99] [I26.99]  Atrial fibrillation (CMS-HCC) [I48.91] [I48.91]  Chronic anticoagulation [Z79.01]             Anticoagulation Episode Summary     INR check location:   [facility]    Preferred lab:       Send INR reminders to:       Comments:         Anticoagulation Care Providers     Provider Role Specialty Phone number    Graciela Andersen M.D. Responsible Family Medicine [phone]    [facility] Responsible  [phone]        Anticoagulation Patient Findings      HPI:   Tj Bauman seen in clinic today, they are here today for a INR check on anticoagulation therapy with warfarin because they have dvt and pe    The reason for today's visit is to prevent morbidity and mortality from a blood clot  and to reduce the risk of bleeding while on a anticoagulant.     Additional education provided today regarding reducing bleed risk and dietary constraints:  About how vitamin K and foods work with warfarin and the bleeding risk on a anticoagulant     Any upcoming procedures:   none    Confirmed warfarin dosing regimen  Interval Changes with foods rich in vitamin K: No  Interval Changes in ETOH:   No  Interval Changes in smoking status:  No  Interval Changes in medication:  No   Cost restriction:  No    S/S of bleeding or bruising:  No  Signs/symptoms  thrombosis since the last appt:  No  Bleed risk is:  moderate,     3 vitals  included with today's appt :  (BP, HR, weight, ht, RR)     Assessment:   INR  therapeutic.        no change is needed today because INR is in range.      They have a TTR of 49  which is not at target (TTR target/goal is 100%) and requires close follow up to prevent a adverse event (the lower the TTR the higher risk of clots, strokes, or bleeding).       Plan:  Continue weekly warfarin dose as noted      Follow up:  Follow up appointment in 3 week(s)       Other info:  Pt educated to contact our clinic with any changes in medications or s/s of bleeding or thrombosis    CHEST guidelines recommend frequent INR monitoring at regular intervals (a few days up to a max of 12 weeks) to ensure they are on the proper dose of warfarin and not having any complications from therapy.  INRs can dramatically change over a short time period due to diet, medications, and medical conditions.

## 2018-11-19 ENCOUNTER — ANTICOAGULATION VISIT (OUTPATIENT)
Dept: MEDICAL GROUP | Facility: PHYSICIAN GROUP | Age: 73
End: 2018-11-19
Payer: MEDICARE

## 2018-11-19 DIAGNOSIS — Z79.01 CHRONIC ANTICOAGULATION: ICD-10-CM

## 2018-11-19 LAB — INR PPP: 2.1 (ref 2–3.5)

## 2018-11-19 PROCEDURE — 85610 PROTHROMBIN TIME: CPT | Performed by: FAMILY MEDICINE

## 2018-11-19 PROCEDURE — 99999 PR NO CHARGE: CPT | Performed by: FAMILY MEDICINE

## 2018-11-19 NOTE — PROGRESS NOTES
OP Anticoagulation Service Note    Date: 11/19/2018  There were no vitals filed for this visit.    Anticoagulation Summary  As of 11/19/2018    INR goal:   2.0-3.0   TTR:   52.6 % (11.3 mo)   Today's INR:   2.1   Warfarin maintenance plan:   10 mg (5 mg x 2) on Mon, Wed, Fri; 7.5 mg (5 mg x 1.5) all other days   Weekly warfarin total:   60 mg   Plan last modified:   Rai Ferrell, PharmD (4/9/2018)   Next INR check:   12/10/2018   Target end date:   Indefinite    Indications    Deep vein thrombosis (CMS-HCC) [I82.409] [I82.409]  Pulmonary embolism (CMS-HCC) [I26.99] [I26.99]  Atrial fibrillation (CMS-HCC) [I48.91] [I48.91]  Chronic anticoagulation [Z79.01]             Anticoagulation Episode Summary     INR check location:   [facility]    Preferred lab:       Send INR reminders to:       Comments:         Anticoagulation Care Providers     Provider Role Specialty Phone number    Graciela Andersen M.D. Responsible Family Medicine [phone]    [facility] Responsible  [phone]        Anticoagulation Patient Findings      HPI:   Tj Bauman seen in clinic today, they are here today for a INR check on anticoagulation therapy with warfarin because they have DVT     The reason for today's visit is to prevent morbidity and mortality from a blood clot  and to reduce the risk of bleeding while on a anticoagulant.     Additional education provided today regarding reducing bleed risk and dietary constraints:  About how vitamin K and foods work with warfarin and the bleeding risk on a anticoagulant     Any upcoming procedures:   none    Confirmed warfarin dosing regimen  Interval Changes with foods rich in vitamin K: No  Interval Changes in ETOH:   No  Interval Changes in smoking status:  No  Interval Changes in medication:  No   Cost restriction:  No    S/S of bleeding or bruising:  No  Signs/symptoms  thrombosis since the last appt:  No  Bleed risk is:  moderate,     3 vitals included  with today's appt :  (BP, HR, weight, ht, RR)     Assessment:   INR  therapeutic.        no change is needed today because INR is in range.      They have a TTR of 52.6  which is not at target (TTR target/goal is 100%) and requires close follow up to prevent a adverse event (the lower the TTR the higher risk of clots, strokes, or bleeding).       Plan:  Continue weekly warfarin dose as noted      Follow up:  Follow up appointment in 3 week(s)       Other info:  Pt educated to contact our clinic with any changes in medications or s/s of bleeding or thrombosis    CHEST guidelines recommend frequent INR monitoring at regular intervals (a few days up to a max of 12 weeks) to ensure they are on the proper dose of warfarin and not having any complications from therapy.  INRs can dramatically change over a short time period due to diet, medications, and medical conditions.

## 2018-12-04 DIAGNOSIS — I48.91 ATRIAL FIBRILLATION, UNSPECIFIED TYPE (HCC): ICD-10-CM

## 2018-12-10 ENCOUNTER — ANTICOAGULATION VISIT (OUTPATIENT)
Dept: MEDICAL GROUP | Facility: PHYSICIAN GROUP | Age: 73
End: 2018-12-10
Payer: MEDICARE

## 2018-12-10 DIAGNOSIS — Z79.01 CHRONIC ANTICOAGULATION: ICD-10-CM

## 2018-12-10 LAB — INR PPP: 3.7 (ref 2–3.5)

## 2018-12-10 PROCEDURE — 99211 OFF/OP EST MAY X REQ PHY/QHP: CPT | Performed by: FAMILY MEDICINE

## 2018-12-10 PROCEDURE — 85610 PROTHROMBIN TIME: CPT | Performed by: FAMILY MEDICINE

## 2018-12-10 NOTE — PROGRESS NOTES
OP Anticoagulation Service Note    Date: 12/10/2018  There were no vitals filed for this visit.    Anticoagulation Summary  As of 12/10/2018    INR goal:   2.0-3.0   TTR:   52.9 % (1 y)   Today's INR:   3.7!   Warfarin maintenance plan:   10 mg (5 mg x 2) on Mon, Wed, Fri; 7.5 mg (5 mg x 1.5) all other days   Weekly warfarin total:   60 mg   Plan last modified:   Rai Ferrell, PharmD (4/9/2018)   Next INR check:   12/31/2018   Target end date:   Indefinite    Indications    Deep vein thrombosis (CMS-HCC) [I82.409] [I82.409]  Pulmonary embolism (CMS-HCC) [I26.99] [I26.99]  Atrial fibrillation (CMS-HCC) [I48.91] [I48.91]  Chronic anticoagulation [Z79.01]             Anticoagulation Episode Summary     INR check location:   [facility]    Preferred lab:       Send INR reminders to:       Comments:         Anticoagulation Care Providers     Provider Role Specialty Phone number    Graciela Andersen M.D. Responsible Family Medicine [phone]    [facility] Responsible  [phone]        Anticoagulation Patient Findings      HPI:   Tj Bauman seen in clinic today, they are here today for a INR check on anticoagulation therapy with warfarin because they have DVT    The reason for today's visit is to prevent morbidity and mortality from a blood clot  and to reduce the risk of bleeding while on a anticoagulant.     Additional education provided today regarding reducing bleed risk and dietary constraints:  About how vitamin K and foods work with warfarin and the bleeding risk on a anticoagulant     Any upcoming procedures:   none    Confirmed warfarin dosing regimen  Interval Changes with foods rich in vitamin K: No  Interval Changes in ETOH:   No  Interval Changes in smoking status:  No  Interval Changes in medication:  No   Cost restriction:  No    S/S of bleeding or bruising:  No  Signs/symptoms  thrombosis since the last appt:  No  Bleed risk is:  moderate,     3 vitals included with  today's appt :  (BP, HR, weight, ht, RR)     Assessment:   INR  supra-therapeutic.      a change is needed today because INR is out of range.      They have a TTR of 52.9  which is not at target (TTR target/goal is 100%) and requires close follow up to prevent a adverse event (the lower the TTR the higher risk of clots, strokes, or bleeding).       Plan:  Hold tonight then continue weekly warfarin dose as noted      Follow up:  Follow up appointment in 3 week(s)       Other info:  Pt educated to contact our clinic with any changes in medications or s/s of bleeding or thrombosis    CHEST guidelines recommend frequent INR monitoring at regular intervals (a few days up to a max of 12 weeks) to ensure they are on the proper dose of warfarin and not having any complications from therapy.  INRs can dramatically change over a short time period due to diet, medications, and medical conditions.

## 2018-12-31 ENCOUNTER — ANTICOAGULATION VISIT (OUTPATIENT)
Dept: MEDICAL GROUP | Facility: PHYSICIAN GROUP | Age: 73
End: 2018-12-31
Payer: MEDICARE

## 2018-12-31 DIAGNOSIS — Z79.01 CHRONIC ANTICOAGULATION: ICD-10-CM

## 2018-12-31 LAB — INR PPP: 3.4 (ref 2–3.5)

## 2018-12-31 PROCEDURE — 99211 OFF/OP EST MAY X REQ PHY/QHP: CPT | Performed by: FAMILY MEDICINE

## 2018-12-31 PROCEDURE — 85610 PROTHROMBIN TIME: CPT | Performed by: FAMILY MEDICINE

## 2019-01-21 ENCOUNTER — ANTICOAGULATION VISIT (OUTPATIENT)
Dept: MEDICAL GROUP | Facility: PHYSICIAN GROUP | Age: 74
End: 2019-01-21
Payer: MEDICARE

## 2019-01-21 DIAGNOSIS — Z79.01 CHRONIC ANTICOAGULATION: Primary | ICD-10-CM

## 2019-01-21 LAB — INR PPP: 5.7 (ref 2–3.5)

## 2019-01-21 PROCEDURE — 99211 OFF/OP EST MAY X REQ PHY/QHP: CPT | Performed by: FAMILY MEDICINE

## 2019-01-21 PROCEDURE — 85610 PROTHROMBIN TIME: CPT | Performed by: FAMILY MEDICINE

## 2019-01-21 NOTE — PROGRESS NOTES
OP Anticoagulation Service Note    Date: 1/21/2019  There were no vitals filed for this visit.    Anticoagulation Summary  As of 1/21/2019    INR goal:   2.0-3.0   TTR:   47.4 % (1.1 y)   Today's INR:   5.7!   Warfarin maintenance plan:   7.5 mg (5 mg x 1.5) every day   Weekly warfarin total:   52.5 mg   Plan last modified:   Rai Ferrell, PharmD (1/21/2019)   Next INR check:   1/28/2019   Target end date:   Indefinite    Indications    Deep vein thrombosis (CMS-HCC) [I82.409] [I82.409]  Pulmonary embolism (CMS-HCC) [I26.99] [I26.99]  Atrial fibrillation (CMS-HCC) [I48.91] [I48.91]  Chronic anticoagulation [Z79.01]             Anticoagulation Episode Summary     INR check location:   [facility]    Preferred lab:       Send INR reminders to:       Comments:         Anticoagulation Care Providers     Provider Role Specialty Phone number    Graciela Andersen M.D. Responsible Family Medicine [phone]    [facility] Responsible  [phone]        Anticoagulation Patient Findings      HPI:   Tj Bauman seen in clinic today, they are here today for a INR check on anticoagulation therapy with warfarin because they have DVT and PE    The reason for today's visit is to prevent morbidity and mortality from a blood clot  and to reduce the risk of bleeding while on a anticoagulant.     Additional education provided today regarding reducing bleed risk and dietary constraints:  About how vitamin K and foods work with warfarin and the bleeding risk on a anticoagulant     Any upcoming procedures:   none    Confirmed warfarin dosing regimen  Interval Changes with foods rich in vitamin K: No  Interval Changes in ETOH:   No  Interval Changes in smoking status:  No  Interval Changes in medication:  No   Cost restriction:  No    S/S of bleeding or bruising:  No  Signs/symptoms  thrombosis since the last appt:  No  Bleed risk is:  moderate,     3 vitals included with today's appt :  (BP, HR,  weight, ht, RR)     Assessment:   INR  supra-therapeutic.      a change is needed today because INR is out of range.  DVT    They have a TTR of 47.4  which is not at target (TTR target/goal is 100%) and requires close follow up to prevent a adverse event (the lower the TTR the higher risk of clots, strokes, or bleeding).       Plan:  Hold two days then decrease weekly warfarin dose as noted      Follow up:  Follow up appointment in 1 week(s)       Other info:  Pt educated to contact our clinic with any changes in medications or s/s of bleeding or thrombosis    CHEST guidelines recommend frequent INR monitoring at regular intervals (a few days up to a max of 12 weeks) to ensure they are on the proper dose of warfarin and not having any complications from therapy.  INRs can dramatically change over a short time period due to diet, medications, and medical conditions.

## 2019-01-28 ENCOUNTER — ANTICOAGULATION VISIT (OUTPATIENT)
Dept: MEDICAL GROUP | Facility: PHYSICIAN GROUP | Age: 74
End: 2019-01-28
Payer: MEDICARE

## 2019-01-28 DIAGNOSIS — Z79.01 CHRONIC ANTICOAGULATION: Primary | ICD-10-CM

## 2019-01-28 LAB — INR PPP: 3.6 (ref 2–3.5)

## 2019-01-28 PROCEDURE — 99211 OFF/OP EST MAY X REQ PHY/QHP: CPT | Performed by: FAMILY MEDICINE

## 2019-01-28 PROCEDURE — 85610 PROTHROMBIN TIME: CPT | Performed by: FAMILY MEDICINE

## 2019-01-28 NOTE — PROGRESS NOTES
Anticoagulation Summary  As of 1/28/2019    INR goal:   2.0-3.0   TTR:   46.5 % (1.1 y)   Today's INR:   3.6!   Warfarin maintenance plan:   5 mg (5 mg x 1) on Mon, Wed, Fri; 7.5 mg (5 mg x 1.5) all other days   Weekly warfarin total:   45 mg   Plan last modified:   Sheila Olivares, PharmD (1/28/2019)   Next INR check:   2/4/2019   Target end date:   Indefinite    Indications    Deep vein thrombosis (CMS-HCC) [I82.409] [I82.409]  Pulmonary embolism (CMS-HCC) [I26.99] [I26.99]  Atrial fibrillation (CMS-HCC) [I48.91] [I48.91]  Chronic anticoagulation [Z79.01]             Anticoagulation Episode Summary     INR check location:   [facility]    Preferred lab:       Send INR reminders to:       Comments:         Anticoagulation Care Providers     Provider Role Specialty Phone number    Graciela Andersen M.D. Responsible Family Medicine [phone]    [facility] Responsible  [phone]        Anticoagulation Patient Findings      HPI:  Tj Bauman seen in clinic today, on anticoagulation therapy with warfarin for Hx DVT  Reason for today's visit (per our collaborative practice policy) is because their last INR was 5.7 on 1/21/19. Intervention at the last visit: pt held two days of warfarin and a dose reduction  Changes to current medical/health status since last appt: pt not eating much still, ate more this week than last.  No - signs/symptoms of bleeding and/or thrombosis since the last appt.    Yes - pt doesn't feel like eating, ate more this week than last -interval changes to diet or any interval changes to medications since last appt.   No - complications or cost restrictions with current therapy.   BP declined    A/P   INR  supra-therapeutic.   Possible reason(s) INR is not in range today: dietary  Pt already took his warfarin dose today, so will have pt HOLD his warfarin dose tomorrow and then start a 15% reduced weekly warfarin regimen. Pt will continue to work on his diet/appitete.      Follow up appointment in 1 weeks to reduce risk of adverse events related to this high risk medication, Warfarin.    Purpose of next visit:  They are at a increased risk of bleeding because INR above goal.    Other info:  Pt educated to contact our clinic with any changes in medications or s/s of bleeding or thrombosis  CHEST guidelines recommend frequent INR monitoring at regular intervals (a few days up to a max of 12 weeks) to ensure they are on the proper dose of warfarin and not having any complications from therapy. INRs can dramatically change over a short time period due to diet, medications, and medical conditions.     Shiela Olivares, PharmD

## 2019-02-01 ENCOUNTER — TELEPHONE (OUTPATIENT)
Dept: VASCULAR LAB | Facility: MEDICAL CENTER | Age: 74
End: 2019-02-01

## 2019-02-04 ENCOUNTER — ANTICOAGULATION VISIT (OUTPATIENT)
Dept: MEDICAL GROUP | Facility: PHYSICIAN GROUP | Age: 74
End: 2019-02-04
Payer: MEDICARE

## 2019-02-04 DIAGNOSIS — I82.401 ACUTE DEEP VEIN THROMBOSIS (DVT) OF RIGHT LOWER EXTREMITY, UNSPECIFIED VEIN (HCC): ICD-10-CM

## 2019-02-04 DIAGNOSIS — Z79.01 CHRONIC ANTICOAGULATION: ICD-10-CM

## 2019-02-04 LAB — INR PPP: 1.8 (ref 2–3.5)

## 2019-02-04 PROCEDURE — 99211 OFF/OP EST MAY X REQ PHY/QHP: CPT | Performed by: FAMILY MEDICINE

## 2019-02-04 PROCEDURE — 85610 PROTHROMBIN TIME: CPT | Performed by: FAMILY MEDICINE

## 2019-02-04 NOTE — PROGRESS NOTES
Anticoagulation Summary  As of 2019    INR goal:   2.0-3.0   TTR:   46.7 % (1.1 y)   INR used for dosin.8! (2019)   Warfarin maintenance plan:   5 mg (5 mg x 1) every Tue, Thu; 7.5 mg (5 mg x 1.5) all other days   Weekly warfarin total:   47.5 mg   Plan last modified:   Sis Mae (2019)   Next INR check:   2019   Target end date:   Indefinite    Indications    Deep vein thrombosis (CMS-HCC) [I82.409] [I82.409]  Pulmonary embolism (CMS-HCC) [I26.99] [I26.99]  Atrial fibrillation (CMS-HCC) [I48.91] [I48.91]  Chronic anticoagulation [Z79.01]             Anticoagulation Episode Summary     INR check location:   [facility]    Preferred lab:       Send INR reminders to:       Comments:         Anticoagulation Care Providers     Provider Role Specialty Phone number    Graciela Andersen M.D. Responsible Family Medicine [phone]    [facility] Responsible  [phone]        Anticoagulation Patient Findings  Patient Findings     Negatives:   Signs/symptoms of thrombosis, Signs/symptoms of bleeding, Laboratory test error suspected, Change in health, Change in alcohol use, Change in activity, Upcoming invasive procedure, Emergency department visit, Upcoming dental procedure, Missed doses, Extra doses, Change in medications, Change in diet/appetite, Hospital admission, Bruising, Other complaints        History of Present Illness: follow up appointment for chronic anticoagulation with the high risk medication, warfarin for DVT/PE    Last INR was out of range, dosage adjusted: pt is now sub therapeutic today.  Will increase weekly dose by 6%. Follow up in 1 weeks, to reduce the risk of adverse events related to this high risk medication, warfarin.    Sis Mae, Clinical Pharmacist  Pt declines vitals today

## 2019-02-05 DIAGNOSIS — I48.91 ATRIAL FIBRILLATION, UNSPECIFIED TYPE (HCC): ICD-10-CM

## 2019-02-11 ENCOUNTER — ANTICOAGULATION VISIT (OUTPATIENT)
Dept: VASCULAR LAB | Facility: MEDICAL CENTER | Age: 74
End: 2019-02-11
Attending: INTERNAL MEDICINE
Payer: MEDICARE

## 2019-02-11 VITALS — DIASTOLIC BLOOD PRESSURE: 84 MMHG | HEART RATE: 96 BPM | SYSTOLIC BLOOD PRESSURE: 125 MMHG

## 2019-02-11 DIAGNOSIS — I48.91 ATRIAL FIBRILLATION, UNSPECIFIED TYPE (HCC): ICD-10-CM

## 2019-02-11 DIAGNOSIS — I26.99 OTHER PULMONARY EMBOLISM WITHOUT ACUTE COR PULMONALE, UNSPECIFIED CHRONICITY (HCC): ICD-10-CM

## 2019-02-11 DIAGNOSIS — Z79.01 CHRONIC ANTICOAGULATION: ICD-10-CM

## 2019-02-11 DIAGNOSIS — I82.401 ACUTE DEEP VEIN THROMBOSIS (DVT) OF RIGHT LOWER EXTREMITY, UNSPECIFIED VEIN (HCC): ICD-10-CM

## 2019-02-11 LAB
INR BLD: 1.9 (ref 0.9–1.2)
INR PPP: 1.9 (ref 2–3.5)

## 2019-02-11 PROCEDURE — 85610 PROTHROMBIN TIME: CPT

## 2019-02-11 PROCEDURE — 99212 OFFICE O/P EST SF 10 MIN: CPT | Performed by: NURSE PRACTITIONER

## 2019-02-11 NOTE — PROGRESS NOTES
Anticoagulation Summary  As of 2019    INR goal:   2.0-3.0   TTR:   46.0 % (1.2 y)   INR used for dosin.9! (2019)   Warfarin maintenance plan:   5 mg (5 mg x 1) every Thu; 7.5 mg (5 mg x 1.5) all other days   Weekly warfarin total:   50 mg   Plan last modified:   Krysta Heard, A.P.N. (2019)   Next INR check:   2019   Target end date:   Indefinite    Indications    Deep vein thrombosis (CMS-HCC) [I82.409] [I82.409]  Pulmonary embolism (CMS-HCC) [I26.99] [I26.99]  Atrial fibrillation (CMS-HCC) [I48.91] [I48.91]  Chronic anticoagulation [Z79.01]             Anticoagulation Episode Summary     INR check location:   [facility]    Preferred lab:       Send INR reminders to:       Comments:         Anticoagulation Care Providers     Provider Role Specialty Phone number    Graciela Andersen M.D. Responsible Family Medicine [phone]    [facility] Responsible  [phone]        Anticoagulation Patient Findings      HPI:  Tj Bauman seen in clinic today for follow up on anticoagulation therapy in the presence of DVT, PE, AF hx. Denies any changes to current medical/health status since last appointment. Denies any medication or diet changes. No current symptoms of bleeding or thrombosis reported.    A/P:   INR subtherapeutic. Will increase regimen. No recent VTEs. Low CHADS 2 score. BP recorded in vitals.    Follow up appointment in 2 week(s) per pt's preference.    Next Appointment: 2019 at 3:15 pm.     Krysta AIKEN

## 2019-02-25 ENCOUNTER — ANTICOAGULATION VISIT (OUTPATIENT)
Dept: MEDICAL GROUP | Facility: PHYSICIAN GROUP | Age: 74
End: 2019-02-25
Payer: MEDICARE

## 2019-02-25 DIAGNOSIS — Z79.01 CHRONIC ANTICOAGULATION: ICD-10-CM

## 2019-02-25 LAB — INR PPP: 3 (ref 2–3.5)

## 2019-02-25 PROCEDURE — 93793 ANTICOAG MGMT PT WARFARIN: CPT | Performed by: FAMILY MEDICINE

## 2019-02-25 PROCEDURE — 85610 PROTHROMBIN TIME: CPT | Performed by: FAMILY MEDICINE

## 2019-02-25 NOTE — PROGRESS NOTES
OP Anticoagulation Service Note    Date: 2/25/2019  There were no vitals filed for this visit.    Anticoagulation Summary  As of 2/25/2019    INR goal:   2.0-3.0   TTR:   47.5 % (1.2 y)   INR used for dosing:   3.0 (2/25/2019)   Warfarin maintenance plan:   5 mg (5 mg x 1) every Thu; 7.5 mg (5 mg x 1.5) all other days   Weekly warfarin total:   50 mg   Plan last modified:   KIMBERLY Thompson (2/11/2019)   Next INR check:   3/11/2019   Target end date:   Indefinite    Indications    Deep vein thrombosis (CMS-HCC) [I82.409] [I82.409]  Pulmonary embolism (CMS-HCC) [I26.99] [I26.99]  Atrial fibrillation (CMS-HCC) [I48.91] [I48.91]  Chronic anticoagulation [Z79.01]             Anticoagulation Episode Summary     INR check location:   [facility]    Preferred lab:       Send INR reminders to:       Comments:         Anticoagulation Care Providers     Provider Role Specialty Phone number    Graciela Andersen M.D. Responsible Family Medicine [phone]    [facility] Responsible  [phone]        Anticoagulation Patient Findings      HPI:   Tj Goldbergell seen in clinic today, they are here today for a INR check on anticoagulation therapy with warfarin because they have DVT and a PE    The reason for today's visit is to prevent morbidity and mortality from a Blood clot  and to reduce the risk of bleeding while on a anticoagulant.     Additional education provided today regarding reducing bleed risk and dietary constraints:  About how vitamin K and foods work with warfarin and the bleeding risk on a anticoagulant     Any upcoming procedures:   none    Confirmed warfarin dosing regimen  Interval Changes with foods rich in vitamin K: No  Interval Changes in ETOH:   No  Interval Changes in smoking status:  No  Interval Changes in medication:  No   Cost restriction:  No    S/S of bleeding or bruising:  No  Signs/symptoms  thrombosis since the last appt:  No  Bleed risk is:  moderate,     3  vitals included with today's appt :  (BP, HR, weight, ht, RR)     Assessment:   INR  therapeutic.        no change is needed today because INR is in range.      They have a TTR of 47.5  which is not at target (TTR target/goal is 100%) and requires close follow up to prevent a adverse event (the lower the TTR the higher risk of clots, strokes, or bleeding).       Plan:  Continue weekly warfarin dose as noted      Follow up:  Follow up appointment in 1 week(s)       Other info:  Pt educated to contact our clinic with any changes in medications or s/s of bleeding or thrombosis    CHEST guidelines recommend frequent INR monitoring at regular intervals (a few days up to a max of 12 weeks) to ensure they are on the proper dose of warfarin and not having any complications from therapy.  INRs can dramatically change over a short time period due to diet, medications, and medical conditions.

## 2019-03-04 ENCOUNTER — HOSPITAL ENCOUNTER (OUTPATIENT)
Dept: RADIOLOGY | Facility: MEDICAL CENTER | Age: 74
End: 2019-03-04

## 2019-03-04 ENCOUNTER — ANTICOAGULATION VISIT (OUTPATIENT)
Dept: MEDICAL GROUP | Facility: PHYSICIAN GROUP | Age: 74
End: 2019-03-04
Payer: MEDICARE

## 2019-03-04 DIAGNOSIS — Z79.01 CHRONIC ANTICOAGULATION: Primary | ICD-10-CM

## 2019-03-04 LAB — INR PPP: 2.8 (ref 2–3.5)

## 2019-03-04 PROCEDURE — 85610 PROTHROMBIN TIME: CPT | Performed by: FAMILY MEDICINE

## 2019-03-04 PROCEDURE — 93793 ANTICOAG MGMT PT WARFARIN: CPT | Performed by: FAMILY MEDICINE

## 2019-03-04 NOTE — PROGRESS NOTES
OP Anticoagulation Service Note    Date: 3/4/2019  There were no vitals filed for this visit.    Anticoagulation Summary  As of 3/4/2019    INR goal:   2.0-3.0   TTR:   48.3 % (1.2 y)   INR used for dosin.8 (3/4/2019)   Warfarin maintenance plan:   5 mg (5 mg x 1) every Thu; 7.5 mg (5 mg x 1.5) all other days   Weekly warfarin total:   50 mg   Plan last modified:   KIMBERLY Thompson (2019)   Next INR check:   3/18/2019   Target end date:   Indefinite    Indications    Deep vein thrombosis (CMS-HCC) [I82.409] [I82.409]  Pulmonary embolism (CMS-HCC) [I26.99] [I26.99]  Atrial fibrillation (CMS-HCC) [I48.91] [I48.91]  Chronic anticoagulation [Z79.01]             Anticoagulation Episode Summary     INR check location:   [facility]    Preferred lab:       Send INR reminders to:       Comments:         Anticoagulation Care Providers     Provider Role Specialty Phone number    Graciela Andersen M.D. Responsible Family Medicine [phone]    [facility] Responsible  [phone]        Anticoagulation Patient Findings      HPI:   Tj Bauman seen in clinic today, they are here today for a INR check on anticoagulation therapy with warfarin because they have DVT and PE    The reason for today's visit is to prevent morbidity and mortality from a blood clot  and to reduce the risk of bleeding while on a anticoagulant.     Additional education provided today regarding reducing bleed risk and dietary constraints:  About how vitamin K and foods work with warfarin and the bleeding risk on a anticoagulant     Any upcoming procedures:   none    Confirmed warfarin dosing regimen  Interval Changes with foods rich in vitamin K: No  Interval Changes in ETOH:   No  Interval Changes in smoking status:  No  Interval Changes in medication:  No   Cost restriction:  No    S/S of bleeding or bruising:  No  Signs/symptoms  thrombosis since the last appt:  No  Bleed risk is:  moderate,     3 vitals  included with today's appt :  (BP, HR, weight, ht, RR)     Assessment:   INR  therapeutic.        no change is needed today because INR is in range.      They have a TTR of 48.3  which is not at target (TTR target/goal is 100%) and requires close follow up to prevent a adverse event (the lower the TTR the higher risk of clots, strokes, or bleeding).       Plan:  Continue weekly warfarin dose as noted      Follow up:  Follow up appointment in 2 week(s)       Other info:  Pt educated to contact our clinic with any changes in medications or s/s of bleeding or thrombosis    CHEST guidelines recommend frequent INR monitoring at regular intervals (a few days up to a max of 12 weeks) to ensure they are on the proper dose of warfarin and not having any complications from therapy.  INRs can dramatically change over a short time period due to diet, medications, and medical conditions.

## 2019-03-05 ENCOUNTER — HOSPITAL ENCOUNTER (OUTPATIENT)
Dept: HOSPITAL 8 - CARD | Age: 74
Discharge: HOME | End: 2019-03-05
Attending: INTERNAL MEDICINE
Payer: MEDICARE

## 2019-03-05 DIAGNOSIS — I35.0: ICD-10-CM

## 2019-03-05 DIAGNOSIS — J44.9: Primary | ICD-10-CM

## 2019-03-05 PROCEDURE — 94060 EVALUATION OF WHEEZING: CPT

## 2019-03-05 PROCEDURE — 94729 DIFFUSING CAPACITY: CPT

## 2019-03-18 ENCOUNTER — ANTICOAGULATION VISIT (OUTPATIENT)
Dept: MEDICAL GROUP | Facility: PHYSICIAN GROUP | Age: 74
End: 2019-03-18
Payer: MEDICARE

## 2019-03-18 DIAGNOSIS — Z79.01 CHRONIC ANTICOAGULATION: ICD-10-CM

## 2019-03-18 LAB — INR PPP: 2.4 (ref 2–3.5)

## 2019-03-18 PROCEDURE — 93793 ANTICOAG MGMT PT WARFARIN: CPT | Performed by: FAMILY MEDICINE

## 2019-03-18 PROCEDURE — 85610 PROTHROMBIN TIME: CPT | Performed by: FAMILY MEDICINE

## 2019-03-18 NOTE — PROGRESS NOTES
OP Anticoagulation Service Note    Date: 3/18/2019  There were no vitals filed for this visit.    Anticoagulation Summary  As of 3/18/2019    INR goal:   2.0-3.0   TTR:   49.9 % (1.3 y)   INR used for dosin.4 (3/18/2019)   Warfarin maintenance plan:   5 mg (5 mg x 1) every Thu; 7.5 mg (5 mg x 1.5) all other days   Weekly warfarin total:   50 mg   Plan last modified:   KIMBERLY Thompson (2019)   Next INR check:   2019   Target end date:   Indefinite    Indications    Deep vein thrombosis (CMS-HCC) [I82.409] [I82.409]  Pulmonary embolism (CMS-HCC) [I26.99] [I26.99]  Atrial fibrillation (CMS-HCC) [I48.91] [I48.91]  Chronic anticoagulation [Z79.01]             Anticoagulation Episode Summary     INR check location:   [facility]    Preferred lab:       Send INR reminders to:       Comments:         Anticoagulation Care Providers     Provider Role Specialty Phone number    Graciela Andersen M.D. Responsible Family Medicine [phone]    [facility] Responsible  [phone]        Anticoagulation Patient Findings      HPI:   Tj Bauman seen in clinic today, they are here today for a INR check on anticoagulation therapy with warfarin because they have DVT and PE    The reason for today's visit is to prevent morbidity and mortality from a blood clot  and to reduce the risk of bleeding while on a anticoagulant.     Additional education provided today regarding reducing bleed risk and dietary constraints:  About how vitamin K and foods work with warfarin and the bleeding risk on a anticoagulant     Any upcoming procedures:   none    Confirmed warfarin dosing regimen  Interval Changes with foods rich in vitamin K: No  Interval Changes in ETOH:   No  Interval Changes in smoking status:  No  Interval Changes in medication:  No   Cost restriction:  No    S/S of bleeding or bruising:  No  Signs/symptoms  thrombosis since the last appt:  No  Bleed risk is:  moderate,     3 vitals  included with today's appt :  (BP, HR, weight, ht, RR)     Assessment:   INR  therapeutic.        no change is needed today because INR is in range.      They have a TTR of 49  which is not at target (TTR target/goal is 100%) and requires close follow up to prevent a adverse event (the lower the TTR the higher risk of clots, strokes, or bleeding).       Plan:  Continue weekly warfarin dose as noted      Follow up:  Follow up appointment in 2 week(s)       Other info:  Pt educated to contact our clinic with any changes in medications or s/s of bleeding or thrombosis    CHEST guidelines recommend frequent INR monitoring at regular intervals (a few days up to a max of 12 weeks) to ensure they are on the proper dose of warfarin and not having any complications from therapy.  INRs can dramatically change over a short time period due to diet, medications, and medical conditions.

## 2019-03-26 ENCOUNTER — TELEPHONE (OUTPATIENT)
Dept: PULMONOLOGY | Facility: HOSPICE | Age: 74
End: 2019-03-26

## 2019-03-26 ENCOUNTER — OFFICE VISIT (OUTPATIENT)
Dept: PULMONOLOGY | Facility: HOSPICE | Age: 74
End: 2019-03-26
Payer: MEDICARE

## 2019-03-26 VITALS
TEMPERATURE: 97.7 F | WEIGHT: 189 LBS | HEIGHT: 73 IN | DIASTOLIC BLOOD PRESSURE: 72 MMHG | BODY MASS INDEX: 25.05 KG/M2 | SYSTOLIC BLOOD PRESSURE: 120 MMHG | OXYGEN SATURATION: 95 % | RESPIRATION RATE: 16 BRPM | HEART RATE: 125 BPM

## 2019-03-26 DIAGNOSIS — J44.9 CHRONIC OBSTRUCTIVE PULMONARY DISEASE, UNSPECIFIED COPD TYPE (HCC): ICD-10-CM

## 2019-03-26 DIAGNOSIS — J61 ASBESTOSIS (HCC): ICD-10-CM

## 2019-03-26 DIAGNOSIS — Z72.0 TOBACCO USE: ICD-10-CM

## 2019-03-26 DIAGNOSIS — R01.1 SYSTOLIC MURMUR: ICD-10-CM

## 2019-03-26 DIAGNOSIS — J98.11 ATELECTASIS OF RIGHT LUNG: ICD-10-CM

## 2019-03-26 DIAGNOSIS — Z23 NEED FOR VACCINATION: ICD-10-CM

## 2019-03-26 PROCEDURE — 99214 OFFICE O/P EST MOD 30 MIN: CPT | Performed by: INTERNAL MEDICINE
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
## 2019-03-26 NOTE — PROGRESS NOTES
Chief Complaint   Patient presents with   • Results     CT Re       HPI: This patient is a 73 y.o. Male returns after a prolonged hiatus for follow-up of abnormal chest CAT scan.  He is a lifelong smoker and not interested in quitting.  He has COPD by pulmonary function testing however had declined inhalers to date.  He has chronic rounded atelectasis on chest CAT scan with a right lower lobe 5.7 x 5.7 cm opacity noted since 2010.  Whole body PET scan in December 2012 was normal.  He had an updated chest CAT scan with contrast February 4, 2019 which showed stable right lower lobe atelectasis however development of bilateral calcified pleural plaques, with irregular left pleura.  Subsequent PET scan February 6, 2019 showed no abnormal uptake within the lungs.  In recent months he has noted worsening exertional dyspnea and cough.  He has valvular heart disease followed by Dr. Meneses, cardiologist, and requires valve replacement.  He states he had recent pulmonary function testing at [facility], which have been requested.  He believes he was exposed to asbestos in 1964 while in the [employer].  He continues to golf although has had to give up his other activities on account of his respiratory symptoms.    Past Medical History:   Diagnosis Date   • Arrhythmia     afib   • Arthritis    • Breath shortness     post surgery   • Cancer (HCC)     skin   • Dental disorder    • [place] measles    • Gout    • Heart burn    • Heart murmur    • Hypertension    • Mumps    • Personal history of venous thrombosis and embolism     right leg   • Psychiatric problem     PTSD   • Snoring    • Unspecified cataract     bbilateral IOLI       Social History     Social History   • Marital status:      Spouse name: N/A   • Number of children: N/A   • Years of education: N/A     Occupational History   • Not on file.     Social History Main Topics   • Smoking status: Current Every Day Smoker     Packs/day: 1.00     Years: 50.00      Types: Cigarettes   • Smokeless tobacco: Never Used      Comment: pt does not wish to quit smoking at this time   • Alcohol use No      Comment: occacionaly   • Drug use: No   • Sexual activity: Not on file     Other Topics Concern   • Not on file     Social History Narrative   • No narrative on file       Family History   Problem Relation Age of Onset   • Alcohol/Drug Mother    • Psychiatry Father    • Cancer Sister        Current Outpatient Prescriptions on File Prior to Visit   Medication Sig Dispense Refill   • tamsulosin (FLOMAX) 0.4 MG capsule Take  by mouth every day.  11   • amlodipine-benazepril (LOTREL) 5-40 MG per capsule Take 1 Cap by mouth every day.     • warfarin (COUMADIN) 10 MG TABS Take 10 mg by mouth every day. Dosage varies with labwork     • allopurinol (ZYLOPRIM) 300 MG TABS Take 300 mg by mouth every day.     • morphine ER (MS CONTIN) 30 MG Tab CR tablet Take 30 mg by mouth as needed.  0   • gabapentin (NEURONTIN) 300 MG Cap TAKE 1 TO 2 CAPSULES BY MOUTH AT NIGHT  1   • doxycycline (VIBRAMYCIN) 100 MG Tab Take 100 mg by mouth 2 times a day.  2   • cyclobenzaprine (FLEXERIL) 10 MG Tab Take 10 mg by mouth as needed.  4   • Ferrous Sulfate (IRON) 325 (65 Fe) MG Tab Take 1 Tab by mouth 2 Times a Day.     • fexofenadine (ALLEGRA) 180 MG tablet Take 180 mg by mouth as needed.     • Oxycodone-Acetaminophen (PERCOCET-10)  MG Tab Take 1-2 Tabs by mouth every four hours as needed for Severe Pain.     • sulfacetamide (SULAMYD) 10 % Solution Place 1 Drop in both eyes every 3 hours. (Patient not taking: Reported on 2/22/2018) 1 Bottle 0   • clorazepate (TRANXENE) 7.5 MG tablet Take 15 mg by mouth every bedtime.     • oxycodone-acetaminophen (PERCOCET) 7.5-325 MG per tablet Take 1 Tab by mouth every four hours as needed for Mild Pain.       No current facility-administered medications on file prior to visit.        Allergies: Dilaudid [hydromorphone] and Indocin [indometacin sodium]    ROS:  "  Constitutional: Denies fevers, chills, night sweats, fatigue or weight loss  Eyes: Denies vision loss, pain, drainage, double vision  Ears, Nose, Throat: Denies earache, difficulty hearing, tinnitus, nasal congestion, hoarseness  Cardiovascular: Denies chest pain, tightness, palpitations, orthopnea or edema  Respiratory: As in HPI  Sleep: Denies daytime sleepiness, snoring, apneas, insomnia, morning headaches  GI: Denies heartburn, dysphagia, nausea, abdominal pain, diarrhea or constipation  : Denies frequent urination, hematuria, discharge or painful urination  Musculoskeletal: Denies back pain, painful joints, sore muscles  Neurological: Denies weakness or headaches  Skin: No rashes    Blood pressure 120/72, pulse (!) 125, temperature 36.5 °C (97.7 °F), temperature source Temporal, resp. rate 16, height 1.854 m (6' 1\"), weight 85.7 kg (189 lb), SpO2 95 %.    Physical Exam:  Appearance: Well-nourished, well-developed, in no acute distress  HEENT: Normocephalic, atraumatic, white sclera, PERRLA, oropharynx clear  Neck: No adenopathy or masses  Respiratory: no intercostal retractions or accessory muscle use  Lungs auscultation: Clear to auscultation bilaterally, diminished breath sounds  Cardiovascular: Regular rate rhythm. No murmurs, rubs or gallops.  No LE edema  Abdomen: soft, nondistended  Gait: Normal  Digits: No clubbing, cyanosis  Motor: No focal deficits  Orientation: Oriented to time, person and place    Diagnosis:  1. Chronic obstructive pulmonary disease, unspecified COPD type (HCC)  Fluticasone-Umeclidin-Vilant (TRELEGY ELLIPTA) 100-62.5-25 MCG/INH AEROSOL POWDER, BREATH ACTIVATED   2. Atelectasis of right lung     3. Asbestosis (HCC)     4. Tobacco use     5. Need for vaccination  Pneumococcal Conjugate Vaccine 13-Valent   6. Systolic murmur         Plan:  The patient has chronic right lower lobe rounded atelectasis, which has been stable and is benign.  He has however developed extensive calcified " pleural plaques consistent with asbestos related pleural disease.  His whole body PET scan is normal.  He had alleged asbestos exposure in 1964.  He has developed worsening exertional dyspnea and cough which may be secondary to his lung disease versus progressive valve disease.  We will request medical records from Dr. Meneses including echocardiography and pulmonary function testing from [facility].  Recommend starting inhaled bronchodilators with Trelegy Ellipta 1 puff QD; inhaler instructions provided.  Pneumococcal 13 advised.  Smoking cessation encouraged.  Follow-up chest CAT scan without contrast q6 months.  Return in about 3 months (around 6/26/2019).

## 2019-04-01 ENCOUNTER — ANTICOAGULATION VISIT (OUTPATIENT)
Dept: MEDICAL GROUP | Facility: PHYSICIAN GROUP | Age: 74
End: 2019-04-01
Payer: MEDICARE

## 2019-04-01 DIAGNOSIS — Z79.01 CHRONIC ANTICOAGULATION: ICD-10-CM

## 2019-04-01 LAB — INR PPP: 1.4 (ref 2–3.5)

## 2019-04-01 PROCEDURE — 99211 OFF/OP EST MAY X REQ PHY/QHP: CPT | Performed by: FAMILY MEDICINE

## 2019-04-01 PROCEDURE — 85610 PROTHROMBIN TIME: CPT | Performed by: FAMILY MEDICINE

## 2019-04-01 NOTE — PROGRESS NOTES
OP Anticoagulation Service Note    Date: 2019  There were no vitals filed for this visit.    Anticoagulation Summary  As of 2019    INR goal:   2.0-3.0   TTR:   49.6 % (1.3 y)   INR used for dosin.4! (2019)   Warfarin maintenance plan:   7.5 mg (5 mg x 1.5) every day   Weekly warfarin total:   52.5 mg   Plan last modified:   Rai Ferrell, PharmD (2019)   Next INR check:   2019   Target end date:   Indefinite    Indications    Deep vein thrombosis (CMS-HCC) [I82.409] [I82.409]  Pulmonary embolism (CMS-HCC) [I26.99] [I26.99]  Atrial fibrillation (CMS-HCC) [I48.91] [I48.91]  Chronic anticoagulation [Z79.01]             Anticoagulation Episode Summary     [facility] check location:   [facility]    Preferred lab:       Send INR reminders to:       Comments:         Anticoagulation Care Providers     Provider Role Specialty Phone number    Graciela Andersen M.D. Responsible Family Medicine [phone]    [facility] Responsible  [phone]        Anticoagulation Patient Findings      HPI:   Tj Bauman seen in clinic today, they are here today for a INR check on anticoagulation therapy with warfarin because they have DVT    The reason for today's visit is to prevent morbidity and mortality from a blood clot  and to reduce the risk of bleeding while on a anticoagulant.     Additional education provided today regarding reducing bleed risk and dietary constraints:  About how vitamin K and foods work with warfarin and the bleeding risk on a anticoagulant     Any upcoming procedures:   none    Confirmed warfarin dosing regimen  Interval Changes with foods rich in vitamin K: No  Interval Changes in ETOH:   No  Interval Changes in smoking status:  No  Interval Changes in medication:  No   Cost restriction:  No    S/S of bleeding or bruising:  No  Signs/symptoms  thrombosis since the last appt:  No  Bleed risk is:  moderate,     3 vitals included with today's appt :  (BP,  HR, weight, ht, RR)     Assessment:   INR  sub-therapeutic.        a change is needed today because INR is out of range.      They have a TTR of 49  which is not at target (TTR target/goal is 100%) and requires close follow up to prevent a adverse event (the lower the TTR the higher risk of clots, strokes, or bleeding).       Plan:  Continue weekly warfarin dose as noted      Follow up:  Follow up appointment in 1 week(s)       Other info:  Pt educated to contact our clinic with any changes in medications or s/s of bleeding or thrombosis    CHEST guidelines recommend frequent INR monitoring at regular intervals (a few days up to a max of 12 weeks) to ensure they are on the proper dose of warfarin and not having any complications from therapy.  INRs can dramatically change over a short time period due to diet, medications, and medical conditions.

## 2019-04-08 ENCOUNTER — ANTICOAGULATION VISIT (OUTPATIENT)
Dept: MEDICAL GROUP | Facility: PHYSICIAN GROUP | Age: 74
End: 2019-04-08
Payer: MEDICARE

## 2019-04-08 DIAGNOSIS — Z79.01 CHRONIC ANTICOAGULATION: ICD-10-CM

## 2019-04-08 LAB — INR PPP: 2.3 (ref 2–3.5)

## 2019-04-08 PROCEDURE — 85610 PROTHROMBIN TIME: CPT | Performed by: FAMILY MEDICINE

## 2019-04-08 PROCEDURE — 93793 ANTICOAG MGMT PT WARFARIN: CPT | Performed by: FAMILY MEDICINE

## 2019-04-08 NOTE — PROGRESS NOTES
OP Anticoagulation Service Note    Date: 2019  There were no vitals filed for this visit.    Anticoagulation Summary  As of 2019    INR goal:   2.0-3.0   TTR:   49.3 % (1.3 y)   INR used for dosin.3 (2019)   Warfarin maintenance plan:   7.5 mg (5 mg x 1.5) every day   Weekly warfarin total:   52.5 mg   Plan last modified:   Rai Ferrell, PharmD (2019)   Next INR check:   2019   Target end date:   Indefinite    Indications    Deep vein thrombosis (CMS-HCC) [I82.409] [I82.409]  Pulmonary embolism (CMS-HCC) [I26.99] [I26.99]  Atrial fibrillation (CMS-HCC) [I48.91] [I48.91]  Chronic anticoagulation [Z79.01]             Anticoagulation Episode Summary     [facility] check location:   [facility]    Preferred lab:       Send INR reminders to:       Comments:         Anticoagulation Care Providers     Provider Role Specialty Phone number    Graciela Andersen M.D. Responsible Family Medicine [phone]    [facility] Responsible  [phone]        Anticoagulation Patient Findings      HPI:   Tj Bauman seen in clinic today, they are here today for a INR check on anticoagulation therapy with warfarin because they have DVT     The reason for today's visit is to prevent morbidity and mortality from a blood clot  and to reduce the risk of bleeding while on a anticoagulant.     Additional education provided today regarding reducing bleed risk and dietary constraints:  About how vitamin K and foods work with warfarin and the bleeding risk on a anticoagulant     Any upcoming procedures:   none    Confirmed warfarin dosing regimen  Interval Changes with foods rich in vitamin K: No  Interval Changes in ETOH:   No  Interval Changes in smoking status:  No  Interval Changes in medication:  No   Cost restriction:  No    S/S of bleeding or bruising:  No  Signs/symptoms  thrombosis since the last appt:  No  Bleed risk is:  moderate,     3 vitals included with today's appt :  (BP,  HR, weight, ht, RR)     Assessment:   INR  therapeutic.        No change is needed today because INR is in range.    They have a TTR of 49.3 which is not at target (TTR target/goal is 100%) and requires close follow up to prevent a adverse event (the lower the TTR the higher risk of clots, strokes, or bleeding).       Plan:  Continue weekly doses noted    Follow up:  Follow up appointment in 2 week(s)       Other info:  Pt educated to contact our clinic with any changes in medications or s/s of bleeding or thrombosis    CHEST guidelines recommend frequent INR monitoring at regular intervals (a few days up to a max of 12 weeks) to ensure they are on the proper dose of warfarin and not having any complications from therapy.  INRs can dramatically change over a short time period due to diet, medications, and medical conditions.

## 2019-04-22 ENCOUNTER — ANTICOAGULATION VISIT (OUTPATIENT)
Dept: MEDICAL GROUP | Facility: PHYSICIAN GROUP | Age: 74
End: 2019-04-22
Payer: MEDICARE

## 2019-04-22 DIAGNOSIS — Z79.01 CHRONIC ANTICOAGULATION: ICD-10-CM

## 2019-04-22 LAB — INR PPP: 1.8 (ref 2–3.5)

## 2019-04-22 PROCEDURE — 99211 OFF/OP EST MAY X REQ PHY/QHP: CPT | Performed by: FAMILY MEDICINE

## 2019-04-22 PROCEDURE — 85610 PROTHROMBIN TIME: CPT | Performed by: FAMILY MEDICINE

## 2019-04-22 NOTE — PROGRESS NOTES
OP Anticoagulation Service Note    Date: 2019  There were no vitals filed for this visit.    Anticoagulation Summary  As of 2019    INR goal:   2.0-3.0   TTR:   49.6 % (1.4 y)   INR used for dosin.8! (2019)   Warfarin maintenance plan:   7.5 mg (5 mg x 1.5) every day   Weekly warfarin total:   52.5 mg   Plan last modified:   Rai Ferrell, PharmD (2019)   Next INR check:   2019   Target end date:   Indefinite    Indications    Deep vein thrombosis (CMS-HCC) [I82.409] [I82.409]  Pulmonary embolism (CMS-HCC) [I26.99] [I26.99]  Atrial fibrillation (CMS-HCC) [I48.91] [I48.91]  Chronic anticoagulation [Z79.01]             Anticoagulation Episode Summary     [facility] check location[facility]   [facility]    Preferred lab:       Send INR reminders to:       Comments:         Anticoagulation Care Providers     Provider Role Specialty Phone number    Graciela Andersen M.D. Responsible Family Medicine [phone]    [facility] Responsible  [phone]        Anticoagulation Patient Findings      HPI:   Tj Bauman seen in clinic today, they are here today for a INR check on anticoagulation therapy with warfarin because they have DVT    The reason for today's visit is to prevent morbidity and mortality from a stroke  and to reduce the risk of bleeding while on a anticoagulant.     Additional education provided today regarding reducing bleed risk and dietary constraints:  About how vitamin K and foods work with warfarin and the bleeding risk on a anticoagulant     Any upcoming procedures:   He has procedure on Wednesday the .  He was told by his cardiologist to hold warfarin for 2 days prior.  They are possibly replacing his valve.    Confirmed warfarin dosing regimen  Interval Changes with foods rich in vitamin K: No  Interval Changes in ETOH:   No  Interval Changes in smoking status:  No  Interval Changes in medication:  No   Cost restriction:  No    S/S of bleeding or  bruising:  No  Signs/symptoms  thrombosis since the last appt:  No  Bleed risk is:  moderate,     3 vitals included with today's appt :  (BP, HR, weight, ht, RR)     Assessment:   INR  sub-therapeutic.  Declined vitals today.  He reports they have been within normal limits       a change is needed today because INR is out of range.      They have a TTR of 49   which is not at target (TTR target/goal is 100%) and requires close follow up to prevent a adverse event (the lower the TTR the higher risk of clots, strokes, or bleeding).       Plan:  Hold warfarin for 3 days then resume warfarin either Wednesday after the procedure or Thursday based on the doctor's recommendations.    Follow up:  Follow up appointment in 1 week(s)      Other info:  Pt educated to contact our clinic with any changes in medications or s/s of bleeding or thrombosis    CHEST guidelines recommend frequent INR monitoring at regular intervals (a few days up to a max of 12 weeks) to ensure they are on the proper dose of warfarin and not having any complications from therapy.  INRs can dramatically change over a short time period due to diet, medications, and medical conditions.

## 2019-04-25 ENCOUNTER — HOSPITAL ENCOUNTER (OUTPATIENT)
Dept: HOSPITAL 8 - CACL | Age: 74
Discharge: HOME | End: 2019-04-25
Attending: INTERNAL MEDICINE
Payer: MEDICARE

## 2019-04-25 VITALS — HEIGHT: 73 IN | WEIGHT: 180.34 LBS | BODY MASS INDEX: 23.9 KG/M2

## 2019-04-25 VITALS — DIASTOLIC BLOOD PRESSURE: 74 MMHG | SYSTOLIC BLOOD PRESSURE: 126 MMHG

## 2019-04-25 DIAGNOSIS — I10: ICD-10-CM

## 2019-04-25 DIAGNOSIS — Z88.0: ICD-10-CM

## 2019-04-25 DIAGNOSIS — I34.2: ICD-10-CM

## 2019-04-25 DIAGNOSIS — Z88.8: ICD-10-CM

## 2019-04-25 DIAGNOSIS — F17.210: ICD-10-CM

## 2019-04-25 DIAGNOSIS — Z79.01: ICD-10-CM

## 2019-04-25 DIAGNOSIS — I35.0: Primary | ICD-10-CM

## 2019-04-25 DIAGNOSIS — J44.9: ICD-10-CM

## 2019-04-25 DIAGNOSIS — Z88.6: ICD-10-CM

## 2019-04-25 LAB
ANION GAP SERPL CALC-SCNC: 10 MMOL/L (ref 5–15)
APTT BLD: 31 SECONDS (ref 25–31)
BASOPHILS # BLD AUTO: 0.06 X10^3/UL (ref 0–0.1)
BASOPHILS NFR BLD AUTO: 1 % (ref 0–1)
CALCIUM SERPL-MCNC: 9 MG/DL (ref 8.5–10.1)
CHLORIDE SERPL-SCNC: 102 MMOL/L (ref 98–107)
CREAT SERPL-MCNC: 1.25 MG/DL (ref 0.7–1.3)
EOSINOPHIL # BLD AUTO: 0.09 X10^3/UL (ref 0–0.4)
EOSINOPHIL NFR BLD AUTO: 1 % (ref 1–7)
ERYTHROCYTE [DISTWIDTH] IN BLOOD BY AUTOMATED COUNT: 15.9 % (ref 9.4–14.8)
INR PPP: 0.9 (ref 0.93–1.1)
LYMPHOCYTES # BLD AUTO: 1.03 X10^3/UL (ref 1–3.4)
LYMPHOCYTES NFR BLD AUTO: 13 % (ref 22–44)
MCH RBC QN AUTO: 34.3 PG (ref 27.5–34.5)
MCHC RBC AUTO-ENTMCNC: 33.5 G/DL (ref 33.2–36.2)
MCV RBC AUTO: 102.5 FL (ref 81–97)
MD: NO
MONOCYTES # BLD AUTO: 0.57 X10^3/UL (ref 0.2–0.8)
MONOCYTES NFR BLD AUTO: 7 % (ref 2–9)
NEUTROPHILS # BLD AUTO: 6.05 X10^3/UL (ref 1.8–6.8)
NEUTROPHILS NFR BLD AUTO: 78 % (ref 42–75)
PLATELET # BLD AUTO: 272 X10^3/UL (ref 130–400)
PMV BLD AUTO: 7.3 FL (ref 7.4–10.4)
PROTHROMBIN TIME: 9.5 SECONDS (ref 9.6–11.5)
RBC # BLD AUTO: 3.9 X10^6/UL (ref 4.38–5.82)

## 2019-04-25 PROCEDURE — 93456 R HRT CORONARY ARTERY ANGIO: CPT

## 2019-04-25 PROCEDURE — 99157 MOD SED OTHER PHYS/QHP EA: CPT

## 2019-04-25 PROCEDURE — 85610 PROTHROMBIN TIME: CPT

## 2019-04-25 PROCEDURE — 85025 COMPLETE CBC W/AUTO DIFF WBC: CPT

## 2019-04-25 PROCEDURE — 99156 MOD SED OTH PHYS/QHP 5/>YRS: CPT

## 2019-04-25 PROCEDURE — 36415 COLL VENOUS BLD VENIPUNCTURE: CPT

## 2019-04-25 PROCEDURE — 85730 THROMBOPLASTIN TIME PARTIAL: CPT

## 2019-04-25 PROCEDURE — 80048 BASIC METABOLIC PNL TOTAL CA: CPT

## 2019-04-25 PROCEDURE — C1769 GUIDE WIRE: HCPCS

## 2019-04-25 PROCEDURE — C1894 INTRO/SHEATH, NON-LASER: HCPCS

## 2019-04-29 ENCOUNTER — ANTICOAGULATION VISIT (OUTPATIENT)
Dept: MEDICAL GROUP | Facility: PHYSICIAN GROUP | Age: 74
End: 2019-04-29
Payer: MEDICARE

## 2019-04-29 DIAGNOSIS — Z79.01 CHRONIC ANTICOAGULATION: ICD-10-CM

## 2019-04-29 LAB — INR PPP: 1.4 (ref 2–3.5)

## 2019-04-29 PROCEDURE — 85610 PROTHROMBIN TIME: CPT | Performed by: FAMILY MEDICINE

## 2019-04-29 PROCEDURE — 99211 OFF/OP EST MAY X REQ PHY/QHP: CPT | Performed by: FAMILY MEDICINE

## 2019-04-29 RX ORDER — LEVOCETIRIZINE DIHYDROCHLORIDE 5 MG/1
TABLET, FILM COATED ORAL
COMMUNITY

## 2019-04-29 NOTE — PROGRESS NOTES
OP Anticoagulation Service Note    Date: 2019  There were no vitals filed for this visit.    Anticoagulation Summary  As of 2019    INR goal:   2.0-3.0   TTR:   48.9 % (1.4 y)   INR used for dosin.40! (2019)   Warfarin maintenance plan:   7.5 mg (5 mg x 1.5) every day   Weekly warfarin total:   52.5 mg   Plan last modified:   Rai Ferrell, PharmD (2019)   Next INR check:   2019   Target end date:   Indefinite    Indications    Deep vein thrombosis (CMS-HCC) [I82.409] [I82.409]  Pulmonary embolism (CMS-HCC) [I26.99] [I26.99]  Atrial fibrillation (CMS-HCC) [I48.91] [I48.91]  Chronic anticoagulation [Z79.01]             Anticoagulation Episode Summary     [facility] check location[facility]   [facility]    Preferred lab:       Send INR reminders to:       Comments:         Anticoagulation Care Providers     Provider Role Specialty Phone number    Graciela Andersen M.D. Responsible Family Medicine [phone]    [facility] Responsible  [phone]        Anticoagulation Patient Findings      HPI:   Tj Bauman seen in clinic today, they are here today for a INR check on anticoagulation therapy with warfarin because they have DVT    The reason for today's visit is to prevent morbidity and mortality from a blood clot  and to reduce the risk of bleeding while on a anticoagulant.     Additional education provided today regarding reducing bleed risk and dietary constraints:  About how vitamin K and foods work with warfarin and the bleeding risk on a anticoagulant     Any upcoming procedures:   none    Confirmed warfarin dosing regimen  Interval Changes with foods rich in vitamin K: No  Interval Changes in ETOH:   No  Interval Changes in smoking status:  No  Interval Changes in medication:  No   Cost restriction:  No    S/S of bleeding or bruising:  No  Signs/symptoms  thrombosis since the last appt:  No  Bleed risk is:  moderate,     3 vitals included with today's appt  :  (BP, HR, weight, ht, RR)     Assessment:   INR  sub-therapeutic.        a change is needed today because INR is out of range.      They have a TTR of 48.9  which is not at target (TTR target/goal is 100%) and requires close follow up to prevent a adverse event (the lower the TTR the higher risk of clots, strokes, or bleeding).       Plan:  Increase weekly warfarin dose as noted      Follow up:  Follow up appointment in 1 week(s)       Other info:  Pt educated to contact our clinic with any changes in medications or s/s of bleeding or thrombosis    CHEST guidelines recommend frequent INR monitoring at regular intervals (a few days up to a max of 12 weeks) to ensure they are on the proper dose of warfarin and not having any complications from therapy.  INRs can dramatically change over a short time period due to diet, medications, and medical conditions.

## 2019-05-06 ENCOUNTER — ANTICOAGULATION VISIT (OUTPATIENT)
Dept: MEDICAL GROUP | Facility: PHYSICIAN GROUP | Age: 74
End: 2019-05-06
Payer: MEDICARE

## 2019-05-06 DIAGNOSIS — Z79.01 CHRONIC ANTICOAGULATION: ICD-10-CM

## 2019-05-06 LAB — INR PPP: 2.3 (ref 2–3.5)

## 2019-05-06 PROCEDURE — 85610 PROTHROMBIN TIME: CPT | Performed by: FAMILY MEDICINE

## 2019-05-06 PROCEDURE — 99211 OFF/OP EST MAY X REQ PHY/QHP: CPT | Performed by: FAMILY MEDICINE

## 2019-05-06 NOTE — PROGRESS NOTES
OP Anticoagulation Service Note    Date: 2019  There were no vitals filed for this visit.    Anticoagulation Summary  As of 2019    INR goal:   2.0-3.0   TTR:   48.7 % (1.4 y)   INR used for dosin.30 (2019)   Warfarin maintenance plan:   10 mg (5 mg x 2) every Mon, Fri; 7.5 mg (5 mg x 1.5) all other days   Weekly warfarin total:   57.5 mg   Plan last modified:   Rai Ferrell, PharmD (2019)   Next INR check:   2019   Target end date:   Indefinite    Indications    Deep vein thrombosis (CMS-HCC) [I82.409] [I82.409]  Pulmonary embolism (CMS-HCC) [I26.99] [I26.99]  Atrial fibrillation (CMS-HCC) [I48.91] [I48.91]  Chronic anticoagulation [Z79.01]             Anticoagulation Episode Summary     [facility]R check location[facility]   [facility]    Preferred lab:       Send INR reminders to:       Comments:         Anticoagulation Care Providers     Provider Role Specialty Phone number    Graciela Andersen M.D. Responsible Family Medicine [phone]    [facility] Responsible  [phone]        Anticoagulation Patient Findings      HPI:   Tj Goldbergell seen in clinic today, they are here today for a INR check on anticoagulation therapy with warfarin because they have DVT     The reason for today's visit is to prevent morbidity and mortality from a Stroke or blood clot  and to reduce the risk of bleeding while on a anticoagulant.     Additional education provided today regarding reducing bleed risk and dietary constraints:  About how vitamin K and foods work with warfarin and the bleeding risk on a anticoagulant     Any upcoming procedures:   none    Confirmed warfarin dosing regimen  Interval Changes with foods rich in vitamin K: No  Interval Changes in ETOH:   No  Interval Changes in smoking status:  No  Interval Changes in medication:  No   Cost restriction:  No    S/S of bleeding or bruising:  No  Signs/symptoms  thrombosis since the last appt:  No  Bleed risk is:  moderate,      3 vitals included with today's appt :  (BP, HR, weight, ht, RR)     Assessment:   INR -therapeutic.       A change is needed today because INR is in range.  Last week his INR was 1.4 and I gave him an extra 10 mg x 2 days then resume his normal dose.  We will need to increase his weekly dose to match this weekly amount of 57.5 mg.    They have a TTR of 48.7 which is not at target (TTR target/goal is 100%) and requires close follow up to prevent a adverse event (the lower the TTR the higher risk of clots, strokes, or bleeding).       Plan:  Increase weekly dose as noted    Follow up:  Follow up appointment in 2 week(s) per patient      Other info:  Pt educated to contact our clinic with any changes in medications or s/s of bleeding or thrombosis    CHEST guidelines recommend frequent INR monitoring at regular intervals (a few days up to a max of 12 weeks) to ensure they are on the proper dose of warfarin and not having any complications from therapy.  INRs can dramatically change over a short time period due to diet, medications, and medical conditions.
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
## 2019-05-16 ENCOUNTER — OFFICE VISIT (OUTPATIENT)
Dept: SURGERY | Facility: MEDICAL CENTER | Age: 74
End: 2019-05-16
Payer: MEDICARE

## 2019-05-16 VITALS
TEMPERATURE: 98.4 F | WEIGHT: 183.86 LBS | HEART RATE: 117 BPM | DIASTOLIC BLOOD PRESSURE: 68 MMHG | SYSTOLIC BLOOD PRESSURE: 124 MMHG | OXYGEN SATURATION: 93 % | HEIGHT: 73 IN | BODY MASS INDEX: 24.37 KG/M2

## 2019-05-16 DIAGNOSIS — I35.0 SEVERE AORTIC STENOSIS: ICD-10-CM

## 2019-05-16 DIAGNOSIS — I05.0 MITRAL VALVE STENOSIS, UNSPECIFIED ETIOLOGY: ICD-10-CM

## 2019-05-16 PROCEDURE — 99205 OFFICE O/P NEW HI 60 MIN: CPT | Performed by: THORACIC SURGERY (CARDIOTHORACIC VASCULAR SURGERY)

## 2019-05-16 ASSESSMENT — ENCOUNTER SYMPTOMS
MYALGIAS: 1
GASTROINTESTINAL NEGATIVE: 1
DIZZINESS: 1
EYES NEGATIVE: 1
SHORTNESS OF BREATH: 1
PSYCHIATRIC NEGATIVE: 1

## 2019-05-16 NOTE — PATIENT INSTRUCTIONS
You have been scheduled for open heart surgery. A surgery scheduler will be calling you with a date for your preoperative testing.    INSTRUCTIONS FOR SURGERY:    Your surgery is scheduled for __June 14, 2019______ at __0730 am  at [facility]. (Times for surgery may change occasionally if an emergency arises.)       On ____June 13, 2019_________ at 11:30am preoperative testing and paperwork must be completed at [facility], [address] [place] [address].       Go in the [facility] Entrance and check in at the First Floor Admitting desk. Tell them you are pre-registering for your heart surgery. You will be directed from there to the Star Unit on the 2nd floor. Your preoperative testing will include blood work, urinalysis and chest x-ray and may include other testing such as carotid duplex and vein mapping. Lab work may not be done more than 72 hours before surgery.  It is ok to eat and drink before lab work    You will also meet with a cardiac nurse for pre-operative teaching.        DO NOT EAT OR DRINK ANYTHING AFTER MIDNIGHT THE NIGHT BEFORE SURGERY    PLEASE STOP THE FOLLOWING MEDICATIONS ON THE TIMES STATED BELOW:     STOP 4 DAYS PRIOR TO SURGERY: ELIQUIS, XARELTO, PRADAXA, EFFIENT                       STOP 5 DAYS PRIOR TO SURGERY: COUMADIN, EXTRA STRENGTH ASPIRIN                 STOP 7 DAYS PRIOR TO SURGERY: PLAVIX, BRILANTA                 STOP IMMEDIATELY: FISH OIL, GLUCOSAMINE, VITAMIN E AND GINKO BILOBA         On the day of surgery at 4:30am, you will admit to [facility]. Go to the Emergency Room entrance and check in at the admitting desk in the Emergency Room. Tell them you are there for heart surgery.  PLEASE DO NOT BE LATE.      Should you have any additional questions please call the office

## 2019-05-20 ENCOUNTER — ANTICOAGULATION VISIT (OUTPATIENT)
Dept: MEDICAL GROUP | Facility: PHYSICIAN GROUP | Age: 74
End: 2019-05-20
Payer: MEDICARE

## 2019-05-20 DIAGNOSIS — Z79.01 CHRONIC ANTICOAGULATION: ICD-10-CM

## 2019-05-20 LAB — INR PPP: 3.2 (ref 2–3.5)

## 2019-05-20 NOTE — PROGRESS NOTES
OP Anticoagulation Service Note    Date: 5/20/2019  There were no vitals filed for this visit.    Anticoagulation Summary  As of 5/20/2019    INR goal:   2.0-3.0   TTR:   49.5 % (1.4 y)   INR used for dosing:   3.20! (5/20/2019)   Warfarin maintenance plan:   10 mg (5 mg x 2) every Mon, Fri; 7.5 mg (5 mg x 1.5) all other days   Weekly warfarin total:   57.5 mg   Plan last modified:   Rai Ferrell, PharmD (5/6/2019)   Next INR check:   6/5/2019   Target end date:   Indefinite    Indications    Deep vein thrombosis (CMS-HCC) [I82.409] [I82.409]  Pulmonary embolism (CMS-HCC) [I26.99] [I26.99]  Atrial fibrillation (CMS-HCC) [I48.91] [I48.91]  Chronic anticoagulation [Z79.01]             Anticoagulation Episode Summary     [facility] check location[facility]   [facility]    Preferred lab:       Send INR reminders to:       Comments:   not able to see at [facility]       Anticoagulation Care Providers     Provider Role Specialty Phone number    Graciela Andersen M.D. Responsible Family Medicine [phone]    [facility] Responsible  [phone]        Anticoagulation Patient Findings      HPI:   Tj Goldbergell seen in clinic today, they are here today for a INR check on anticoagulation therapy with warfarin because they have DVT and PE    The reason for today's visit is to prevent morbidity and mortality from a blood clot  and to reduce the risk of bleeding while on a anticoagulant.     Dose the patient in the medical group have a [facility] primary care provider and proper insurance:  Graciela Andersen M.D.  [id number]  [facility] [id number]    Additional education provided today regarding reducing bleed risk and dietary constraints:  About how vitamin K and foods work with warfarin and the bleeding risk on a anticoagulant     Any upcoming procedures:   He will be having a open heart surgery in a few weeks.  He would benefit from using Lovenox.  We will see him the week before his procedure to coordinate  injections and check his INR prior to the procedure.    Confirmed warfarin dosing regimen  Interval Changes with foods rich in vitamin K: No  Interval Changes in ETOH:   No  Interval Changes in smoking status:  No  Interval Changes in medication:  No   Cost restriction:  No    S/S of bleeding or bruising:  No  Signs/symptoms  thrombosis since the last appt:  No  Bleed risk is:  moderate,     3 vitals included with today's appt :  (BP, HR, weight, ht, RR)     Assessment:   INR  supra-therapeutic.     a change is needed today because INR is out of range.      He would likely benefit from using Lovenox for his upcoming procedure.  He will also need to hold warfarin for 5 days.  His labs have been within normal limits so I will not order labs as he prefers not to get blood work if he could avoid it.    They have a TTR of 49.5  which is not at target (TTR target/goal is 100%) and requires close follow up to prevent a adverse event (the lower the TTR the higher risk of clots, strokes, or bleeding).       Plan:   7.5mg Friday (per patient) continue weekly warfarin dose as noted      Follow up:  Follow up appointment in 2 week(s)       Other info:  Pt educated to contact our clinic with any changes in medications or s/s of bleeding or thrombosis    CHEST guidelines recommend frequent INR monitoring at regular intervals (a few days up to a max of 12 weeks) to ensure they are on the proper dose of warfarin and not having any complications from therapy.  INRs can dramatically change over a short time period due to diet, medications, and medical conditions.

## 2019-05-31 ENCOUNTER — ANTICOAGULATION MONITORING (OUTPATIENT)
Dept: VASCULAR LAB | Facility: MEDICAL CENTER | Age: 74
End: 2019-05-31

## 2019-05-31 DIAGNOSIS — Z79.01 CHRONIC ANTICOAGULATION: ICD-10-CM

## 2019-05-31 LAB — INR PPP: 3.1 (ref 2–3.5)

## 2019-05-31 NOTE — PROGRESS NOTES
Anticoagulation Summary  As of 5/31/2019    INR goal:   2.0-3.0   TTR:   48.5 % (1.5 y)   INR used for dosing:   3.10! (5/31/2019)   Warfarin maintenance plan:   10 mg (5 mg x 2) every Mon, Fri; 7.5 mg (5 mg x 1.5) all other days   Weekly warfarin total:   57.5 mg   Plan last modified:   Connor GaldamezD (5/6/2019)   Next INR check:   6/7/2019   Target end date:   Indefinite    Indications    Deep vein thrombosis (CMS-HCC) [I82.409] [I82.409]  Pulmonary embolism (CMS-HCC) [I26.99] [I26.99]  Atrial fibrillation (CMS-HCC) [I48.91] [I48.91]  Chronic anticoagulation [Z79.01]             Anticoagulation Episode Summary     [facility] check location[facility]   [facility]    Preferred lab:       Send INR reminders to:       Comments:   not able to see at [facility]       Anticoagulation Care Providers     Provider Role Specialty Phone number    Graciela Andersen M.D. Responsible Family Medicine [phone]    [facility] Responsible  [phone]        Anticoagulation Patient Findings      Spoke to patient on the phone.   INR  supra-therapeutic.   Denies signs/symptoms of bleeding and/or thrombosis.   Denies changes to diet or medications.   Follow up appointment in 1 week(s).    5mg today then continue weekly warfarin dose as noted      Rai Ferrell, PharmD

## 2019-06-07 ENCOUNTER — ANTICOAGULATION MONITORING (OUTPATIENT)
Dept: VASCULAR LAB | Facility: MEDICAL CENTER | Age: 74
End: 2019-06-07

## 2019-06-07 DIAGNOSIS — Z79.01 CHRONIC ANTICOAGULATION: ICD-10-CM

## 2019-06-07 DIAGNOSIS — I48.91 ATRIAL FIBRILLATION, UNSPECIFIED TYPE (HCC): ICD-10-CM

## 2019-06-07 LAB — INR PPP: 2.7 (ref 2–3.5)

## 2019-06-07 NOTE — PROGRESS NOTES
Anticoagulation Summary  As of 2019    INR goal:   2.0-3.0   TTR:   48.9 % (1.5 y)   INR used for dosin.70 (2019)   Warfarin maintenance plan:   10 mg (5 mg x 2) every Mon, Fri; 7.5 mg (5 mg x 1.5) all other days   Weekly warfarin total:   57.5 mg   Plan last modified:   Rai Ferrell, PharmD (2019)   Next INR check:      Target end date:   Indefinite    Indications    Deep vein thrombosis (CMS-HCC) [I82.409] [I82.409]  Pulmonary embolism (CMS-HCC) [I26.99] [I26.99]  Atrial fibrillation (CMS-[facility]) [I48.91] [I48.91]  Chronic anticoagulation [Z79.01]             Anticoagulation Episode Summary     [facility] check location[facility]   [facility]    Preferred lab:       Send INR reminders to:       Comments:   not able to see at [facility]       Anticoagulation Care Providers     Provider Role Specialty Phone number    Graciela Andersen M.D. Responsible Family Medicine [phone]    [facility] Responsible  [phone]        Anticoagulation Patient Findings    LM for patient to discuss upcoming procedure 19.  Given hx, patient would benefit from lovenox bridge per schedule below.  He no-showed appointment on 19 to discuss.  Asked that he call back to clinic today.     : Last dose of warfarin  : NO warfarin  6/10: NO warfarin, Lovenox 80mg two times daily  : NO warfarin, Lovenox 80mg two times daily  : NO warfarin, Lovenox 80mg two times daily  : NO warfarin, NO Lovenox  : Procedure day, restart warfarin per physician discretion  6/15: Warfarin 10mg, restart Lovenox 80mg two times daily  : Warfarin 10mg, Lovenox 80mg two times daily  : Recheck INR  Continue Lovenox until INR >2.0    Alexander Gonzalez, PharmD

## 2019-06-10 ENCOUNTER — ANTICOAGULATION MONITORING (OUTPATIENT)
Dept: MEDICAL GROUP | Facility: PHYSICIAN GROUP | Age: 74
End: 2019-06-10

## 2019-06-10 ENCOUNTER — ANTICOAGULATION MONITORING (OUTPATIENT)
Dept: VASCULAR LAB | Facility: MEDICAL CENTER | Age: 74
End: 2019-06-10

## 2019-06-10 DIAGNOSIS — Z79.01 CHRONIC ANTICOAGULATION: ICD-10-CM

## 2019-06-10 NOTE — PROGRESS NOTES
Anticoagulation Summary  As of 6/10/2019    INR goal:   2.0-3.0   TTR:   48.9 % (1.5 y)   INR used for dosing:      Warfarin maintenance plan:   10 mg (5 mg x 2) every Mon, Fri; 7.5 mg (5 mg x 1.5) all other days   Weekly warfarin total:   57.5 mg   Plan last modified:   Rai Ferrell, PharmD (5/6/2019)   Next INR check:   6/17/2019   Target end date:   Indefinite    Indications    Deep vein thrombosis (CMS-HCC) [I82.409] [I82.409]  Pulmonary embolism (CMS-HCC) [I26.99] [I26.99]  Atrial fibrillation (CMS-[facility]) [I48.91] [I48.91]  Chronic anticoagulation [Z79.01]             Anticoagulation Episode Summary     [facility]   [facility]    Preferred lab:       Send INR reminders to:       Comments:   not able to see at [facility]       Anticoagulation Care Providers     Provider Role Specialty Phone number    Graciela Andersen M.D. Responsible Family Medicine [phone]    [facility] Responsible  [phone]        Anticoagulation Patient Findings     spoke with pt        6/8: Last dose of warfarin  6/9: NO warfarin  6/10: NO warfarin, Lovenox 80mg two times daily  6/11: NO warfarin, Lovenox 80mg two times daily  6/12: NO warfarin, Lovenox 80mg two times daily  6/13: NO warfarin, NO Lovenox      He will them be inpt for at least a week.- follow the instructions from MD in hospital

## 2019-06-13 ENCOUNTER — HOSPITAL ENCOUNTER (INPATIENT)
Dept: HOSPITAL 8 - 5SO | Age: 74
LOS: 8 days | Discharge: HOME HEALTH | DRG: 220 | End: 2019-06-21
Attending: THORACIC SURGERY (CARDIOTHORACIC VASCULAR SURGERY) | Admitting: THORACIC SURGERY (CARDIOTHORACIC VASCULAR SURGERY)
Payer: MEDICARE

## 2019-06-13 VITALS — BODY MASS INDEX: 26 KG/M2 | HEIGHT: 73 IN | WEIGHT: 196.21 LBS

## 2019-06-13 DIAGNOSIS — F10.10: ICD-10-CM

## 2019-06-13 DIAGNOSIS — Z86.718: ICD-10-CM

## 2019-06-13 DIAGNOSIS — M10.9: ICD-10-CM

## 2019-06-13 DIAGNOSIS — D62: ICD-10-CM

## 2019-06-13 DIAGNOSIS — Z86.711: ICD-10-CM

## 2019-06-13 DIAGNOSIS — J44.9: ICD-10-CM

## 2019-06-13 DIAGNOSIS — G89.29: ICD-10-CM

## 2019-06-13 DIAGNOSIS — E87.1: ICD-10-CM

## 2019-06-13 DIAGNOSIS — I10: ICD-10-CM

## 2019-06-13 DIAGNOSIS — I44.2: ICD-10-CM

## 2019-06-13 DIAGNOSIS — Z77.090: ICD-10-CM

## 2019-06-13 DIAGNOSIS — F40.240: ICD-10-CM

## 2019-06-13 DIAGNOSIS — J98.11: ICD-10-CM

## 2019-06-13 DIAGNOSIS — Z79.01: ICD-10-CM

## 2019-06-13 DIAGNOSIS — J61: ICD-10-CM
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
## 2019-06-13 DIAGNOSIS — M25.511: ICD-10-CM

## 2019-06-13 DIAGNOSIS — Z96.652: ICD-10-CM

## 2019-06-13 DIAGNOSIS — E87.5: ICD-10-CM

## 2019-06-13 DIAGNOSIS — Z88.8: ICD-10-CM

## 2019-06-13 DIAGNOSIS — I45.89: ICD-10-CM

## 2019-06-13 DIAGNOSIS — I08.0: Primary | ICD-10-CM

## 2019-06-13 DIAGNOSIS — F17.210: ICD-10-CM

## 2019-06-13 DIAGNOSIS — D68.69: ICD-10-CM

## 2019-06-13 LAB
CULTURE INDICATED?: NO
MICROSCOPIC: (no result)

## 2019-06-13 PROCEDURE — 87081 CULTURE SCREEN ONLY: CPT

## 2019-06-13 PROCEDURE — P9012 CRYOPRECIPITATE EACH UNIT: HCPCS

## 2019-06-13 PROCEDURE — C1751 CATH, INF, PER/CENT/MIDLINE: HCPCS

## 2019-06-13 PROCEDURE — 36415 COLL VENOUS BLD VENIPUNCTURE: CPT

## 2019-06-13 PROCEDURE — 85730 THROMBOPLASTIN TIME PARTIAL: CPT

## 2019-06-13 PROCEDURE — 93005 ELECTROCARDIOGRAM TRACING: CPT

## 2019-06-13 PROCEDURE — P9045 ALBUMIN (HUMAN), 5%, 250 ML: HCPCS

## 2019-06-13 PROCEDURE — 82330 ASSAY OF CALCIUM: CPT

## 2019-06-13 PROCEDURE — 36600 WITHDRAWAL OF ARTERIAL BLOOD: CPT

## 2019-06-13 PROCEDURE — 94640 AIRWAY INHALATION TREATMENT: CPT

## 2019-06-13 PROCEDURE — C1762 CONN TISS, HUMAN(INC FASCIA): HCPCS

## 2019-06-13 PROCEDURE — 86850 RBC ANTIBODY SCREEN: CPT

## 2019-06-13 PROCEDURE — 94002 VENT MGMT INPAT INIT DAY: CPT

## 2019-06-13 PROCEDURE — P9047 ALBUMIN (HUMAN), 25%, 50ML: HCPCS

## 2019-06-13 PROCEDURE — 85018 HEMOGLOBIN: CPT

## 2019-06-13 PROCEDURE — 93306 TTE W/DOPPLER COMPLETE: CPT

## 2019-06-13 PROCEDURE — C1760 CLOSURE DEV, VASC: HCPCS

## 2019-06-13 PROCEDURE — 86900 BLOOD TYPING SEROLOGIC ABO: CPT

## 2019-06-13 PROCEDURE — 93321 DOPPLER ECHO F-UP/LMTD STD: CPT

## 2019-06-13 PROCEDURE — 93325 DOPPLER ECHO COLOR FLOW MAPG: CPT

## 2019-06-13 PROCEDURE — 94150 VITAL CAPACITY TEST: CPT

## 2019-06-13 PROCEDURE — 85025 COMPLETE CBC W/AUTO DIFF WBC: CPT

## 2019-06-13 PROCEDURE — 83735 ASSAY OF MAGNESIUM: CPT

## 2019-06-13 PROCEDURE — 84132 ASSAY OF SERUM POTASSIUM: CPT

## 2019-06-13 PROCEDURE — 71046 X-RAY EXAM CHEST 2 VIEWS: CPT

## 2019-06-13 PROCEDURE — 86923 COMPATIBILITY TEST ELECTRIC: CPT

## 2019-06-13 PROCEDURE — 93312 ECHO TRANSESOPHAGEAL: CPT

## 2019-06-13 PROCEDURE — 80048 BASIC METABOLIC PNL TOTAL CA: CPT

## 2019-06-13 PROCEDURE — 88311 DECALCIFY TISSUE: CPT

## 2019-06-13 PROCEDURE — C1768 GRAFT, VASCULAR: HCPCS

## 2019-06-13 PROCEDURE — 88305 TISSUE EXAM BY PATHOLOGIST: CPT

## 2019-06-13 PROCEDURE — 85014 HEMATOCRIT: CPT

## 2019-06-13 PROCEDURE — 84295 ASSAY OF SERUM SODIUM: CPT

## 2019-06-13 PROCEDURE — P9016 RBC LEUKOCYTES REDUCED: HCPCS

## 2019-06-13 PROCEDURE — 82040 ASSAY OF SERUM ALBUMIN: CPT

## 2019-06-13 PROCEDURE — 85347 COAGULATION TIME ACTIVATED: CPT

## 2019-06-13 PROCEDURE — 83036 HEMOGLOBIN GLYCOSYLATED A1C: CPT

## 2019-06-13 PROCEDURE — 85610 PROTHROMBIN TIME: CPT

## 2019-06-13 PROCEDURE — 82803 BLOOD GASES ANY COMBINATION: CPT

## 2019-06-13 PROCEDURE — 93880 EXTRACRANIAL BILAT STUDY: CPT

## 2019-06-13 PROCEDURE — 80053 COMPREHEN METABOLIC PANEL: CPT

## 2019-06-13 PROCEDURE — 71045 X-RAY EXAM CHEST 1 VIEW: CPT

## 2019-06-13 PROCEDURE — 81003 URINALYSIS AUTO W/O SCOPE: CPT

## 2019-06-13 PROCEDURE — P9035 PLATELET PHERES LEUKOREDUCED: HCPCS

## 2019-06-13 PROCEDURE — 82810 BLOOD GASES O2 SAT ONLY: CPT

## 2019-06-13 PROCEDURE — 82947 ASSAY GLUCOSE BLOOD QUANT: CPT

## 2019-06-13 PROCEDURE — 82962 GLUCOSE BLOOD TEST: CPT

## 2019-06-13 PROCEDURE — 85049 AUTOMATED PLATELET COUNT: CPT

## 2019-06-13 PROCEDURE — 82800 BLOOD PH: CPT

## 2019-06-14 VITALS — DIASTOLIC BLOOD PRESSURE: 43 MMHG | SYSTOLIC BLOOD PRESSURE: 97 MMHG

## 2019-06-14 VITALS — SYSTOLIC BLOOD PRESSURE: 100 MMHG | DIASTOLIC BLOOD PRESSURE: 66 MMHG

## 2019-06-14 VITALS — DIASTOLIC BLOOD PRESSURE: 48 MMHG | SYSTOLIC BLOOD PRESSURE: 99 MMHG

## 2019-06-14 VITALS — DIASTOLIC BLOOD PRESSURE: 42 MMHG | SYSTOLIC BLOOD PRESSURE: 94 MMHG

## 2019-06-14 VITALS — SYSTOLIC BLOOD PRESSURE: 108 MMHG | DIASTOLIC BLOOD PRESSURE: 60 MMHG

## 2019-06-14 VITALS — SYSTOLIC BLOOD PRESSURE: 90 MMHG | DIASTOLIC BLOOD PRESSURE: 44 MMHG

## 2019-06-14 VITALS — SYSTOLIC BLOOD PRESSURE: 104 MMHG | DIASTOLIC BLOOD PRESSURE: 66 MMHG

## 2019-06-14 LAB
ALBUMIN SERPL-MCNC: 3.6 G/DL (ref 3.4–5)
ALP SERPL-CCNC: 57 U/L (ref 45–117)
ALT SERPL-CCNC: 42 U/L (ref 12–78)
ANION GAP SERPL CALC-SCNC: 11 MMOL/L (ref 5–15)
APTT BLD: 32 SECONDS (ref 25–31)
APTT BLD: 37 SECONDS (ref 25–31)
BASOPHILS # BLD AUTO: 0.06 X10^3/UL (ref 0–0.1)
BASOPHILS NFR BLD AUTO: 1 % (ref 0–1)
BILIRUB SERPL-MCNC: 1.1 MG/DL (ref 0.2–1)
CALCIUM SERPL-MCNC: 8.6 MG/DL (ref 8.5–10.1)
CHLORIDE SERPL-SCNC: 109 MMOL/L (ref 98–107)
CREAT SERPL-MCNC: 1.26 MG/DL (ref 0.7–1.3)
EOSINOPHIL # BLD AUTO: 0.12 X10^3/UL (ref 0–0.4)
EOSINOPHIL NFR BLD AUTO: 3 % (ref 1–7)
ERYTHROCYTE [DISTWIDTH] IN BLOOD BY AUTOMATED COUNT: 14.7 % (ref 9.4–14.8)
EST. AVERAGE GLUCOSE BLD GHB EST-MCNC: 100 MG/DL (ref 0–126)
GLUCOSE BY BLOOD GAS ANALYZER: 150 MG/DL (ref 70–110)
HBA1C MFR BLD: 5.1 % (ref 4.2–6.3)
HBV CORE AB SER QL: 4.2 MMOL/L (ref 3.6–5.5)
HEMATOCRIT CALCULATED: 27 % (ref 42–52)
HEMOGLOBIN BY BLOOD GAS ANALYZ: 9.2 G/DL (ref 14–18)
INR PPP: 0.98 (ref 0.93–1.1)
INR PPP: 1.35 (ref 0.93–1.1)
LYMPHOCYTES # BLD AUTO: 1.09 X10^3/UL (ref 1–3.4)
LYMPHOCYTES NFR BLD AUTO: 23 % (ref 22–44)
MCH RBC QN AUTO: 34 PG (ref 27.5–34.5)
MCHC RBC AUTO-ENTMCNC: 33.2 G/DL (ref 33.2–36.2)
MCV RBC AUTO: 102.4 FL (ref 81–97)
MD: (no result)
MONOCYTES # BLD AUTO: 0.47 X10^3/UL (ref 0.2–0.8)
MONOCYTES NFR BLD AUTO: 10 % (ref 2–9)
NEUTROPHILS # BLD AUTO: 3.04 X10^3/UL (ref 1.8–6.8)
NEUTROPHILS NFR BLD AUTO: 64 % (ref 42–75)
O2/TOTAL GAS SETTING VFR VENT: 60 %
PLATELET # BLD AUTO: 197 X10^3/UL (ref 130–400)
PMV BLD AUTO: 7 FL (ref 7.4–10.4)
PROT SERPL-MCNC: 6.5 G/DL (ref 6.4–8.2)
PROTHROMBIN TIME: 10.3 SECONDS (ref 9.6–11.5)
PROTHROMBIN TIME: 14 SECONDS (ref 9.6–11.5)
RBC # BLD AUTO: 3.45 X10^6/UL (ref 4.38–5.82)

## 2019-06-14 PROCEDURE — 30233R1 TRANSFUSION OF NONAUTOLOGOUS PLATELETS INTO PERIPHERAL VEIN, PERCUTANEOUS APPROACH: ICD-10-PCS | Performed by: THORACIC SURGERY (CARDIOTHORACIC VASCULAR SURGERY)

## 2019-06-14 PROCEDURE — 5A1223Z PERFORMANCE OF CARDIAC PACING, CONTINUOUS: ICD-10-PCS | Performed by: THORACIC SURGERY (CARDIOTHORACIC VASCULAR SURGERY)

## 2019-06-14 PROCEDURE — 02RG08Z REPLACEMENT OF MITRAL VALVE WITH ZOOPLASTIC TISSUE, OPEN APPROACH: ICD-10-PCS | Performed by: THORACIC SURGERY (CARDIOTHORACIC VASCULAR SURGERY)

## 2019-06-14 PROCEDURE — 5A1221Z PERFORMANCE OF CARDIAC OUTPUT, CONTINUOUS: ICD-10-PCS | Performed by: THORACIC SURGERY (CARDIOTHORACIC VASCULAR SURGERY)

## 2019-06-14 PROCEDURE — 02RF08Z REPLACEMENT OF AORTIC VALVE WITH ZOOPLASTIC TISSUE, OPEN APPROACH: ICD-10-PCS | Performed by: THORACIC SURGERY (CARDIOTHORACIC VASCULAR SURGERY)

## 2019-06-14 PROCEDURE — 30233M1 TRANSFUSION OF NONAUTOLOGOUS PLASMA CRYOPRECIPITATE INTO PERIPHERAL VEIN, PERCUTANEOUS APPROACH: ICD-10-PCS | Performed by: THORACIC SURGERY (CARDIOTHORACIC VASCULAR SURGERY)

## 2019-06-14 PROCEDURE — 03HY32Z INSERTION OF MONITORING DEVICE INTO UPPER ARTERY, PERCUTANEOUS APPROACH: ICD-10-PCS

## 2019-06-14 PROCEDURE — B246ZZ4 ULTRASONOGRAPHY OF RIGHT AND LEFT HEART, TRANSESOPHAGEAL: ICD-10-PCS | Performed by: THORACIC SURGERY (CARDIOTHORACIC VASCULAR SURGERY)

## 2019-06-14 RX ADMIN — VANCOMYCIN HYDROCHLORIDE SCH MLS/HR: 1 INJECTION, POWDER, LYOPHILIZED, FOR SOLUTION INTRAVENOUS at 18:14

## 2019-06-14 RX ADMIN — SODIUM CHLORIDE, SODIUM LACTATE, POTASSIUM CHLORIDE, AND CALCIUM CHLORIDE PRN ML: .6; .31; .03; .02 INJECTION, SOLUTION INTRAVENOUS at 14:27

## 2019-06-14 RX ADMIN — OXYCODONE HYDROCHLORIDE PRN MG: 5 TABLET ORAL at 16:33

## 2019-06-14 RX ADMIN — SODIUM CHLORIDE, PRESERVATIVE FREE SCH ML: 5 INJECTION INTRAVENOUS at 19:31

## 2019-06-14 RX ADMIN — MUPIROCIN SCH APPLIC: 20 OINTMENT TOPICAL at 19:31

## 2019-06-14 RX ADMIN — SODIUM CHLORIDE, SODIUM LACTATE, POTASSIUM CHLORIDE, AND CALCIUM CHLORIDE PRN ML: .6; .31; .03; .02 INJECTION, SOLUTION INTRAVENOUS at 21:27

## 2019-06-14 RX ADMIN — MORPHINE SULFATE PRN MG: 10 INJECTION INTRAVENOUS at 18:31

## 2019-06-14 RX ADMIN — SODIUM CHLORIDE, PRESERVATIVE FREE SCH ML: 5 INJECTION INTRAVENOUS at 09:59

## 2019-06-14 RX ADMIN — INSULIN LISPRO SCH UNITS: 100 INJECTION, SOLUTION INTRAVENOUS; SUBCUTANEOUS at 19:35

## 2019-06-14 RX ADMIN — CEFUROXIME SCH MLS/HR: 1.5 INJECTION, POWDER, FOR SOLUTION INTRAVENOUS at 19:50

## 2019-06-14 RX ADMIN — INSULIN LISPRO SCH UNITS: 100 INJECTION, SOLUTION INTRAVENOUS; SUBCUTANEOUS at 16:00

## 2019-06-14 RX ADMIN — SODIUM CHLORIDE, SODIUM LACTATE, POTASSIUM CHLORIDE, AND CALCIUM CHLORIDE PRN ML: .6; .31; .03; .02 INJECTION, SOLUTION INTRAVENOUS at 23:51

## 2019-06-14 RX ADMIN — Medication SCH NOTE: at 12:51

## 2019-06-14 RX ADMIN — ONDANSETRON PRN MG: 2 INJECTION, SOLUTION INTRAMUSCULAR; INTRAVENOUS at 20:47

## 2019-06-14 RX ADMIN — MORPHINE SULFATE PRN MG: 10 INJECTION INTRAVENOUS at 20:44

## 2019-06-14 RX ADMIN — HYDROCODONE BITARTRATE AND ACETAMINOPHEN PRN TAB: 10; 325 TABLET ORAL at 23:21

## 2019-06-14 RX ADMIN — Medication SCH NOTE: at 18:38

## 2019-06-14 RX ADMIN — DOCUSATE SODIUM SCH MG: 100 CAPSULE, LIQUID FILLED ORAL at 19:32

## 2019-06-14 RX ADMIN — OXYCODONE HYDROCHLORIDE PRN MG: 5 TABLET ORAL at 19:30

## 2019-06-14 RX ADMIN — SODIUM CHLORIDE, SODIUM LACTATE, POTASSIUM CHLORIDE, AND CALCIUM CHLORIDE PRN ML: .6; .31; .03; .02 INJECTION, SOLUTION INTRAVENOUS at 22:06

## 2019-06-14 RX ADMIN — MAGNESIUM SULFATE HEPTAHYDRATE SCH MLS/HR: 500 INJECTION, SOLUTION INTRAMUSCULAR; INTRAVENOUS at 13:02

## 2019-06-14 RX ADMIN — MORPHINE SULFATE PRN MG: 10 INJECTION INTRAVENOUS at 15:16

## 2019-06-15 VITALS — DIASTOLIC BLOOD PRESSURE: 68 MMHG | SYSTOLIC BLOOD PRESSURE: 115 MMHG

## 2019-06-15 VITALS — DIASTOLIC BLOOD PRESSURE: 44 MMHG | SYSTOLIC BLOOD PRESSURE: 98 MMHG

## 2019-06-15 VITALS — DIASTOLIC BLOOD PRESSURE: 45 MMHG | SYSTOLIC BLOOD PRESSURE: 118 MMHG

## 2019-06-15 VITALS — SYSTOLIC BLOOD PRESSURE: 128 MMHG | DIASTOLIC BLOOD PRESSURE: 49 MMHG

## 2019-06-15 VITALS — DIASTOLIC BLOOD PRESSURE: 50 MMHG | SYSTOLIC BLOOD PRESSURE: 131 MMHG

## 2019-06-15 VITALS — DIASTOLIC BLOOD PRESSURE: 41 MMHG | SYSTOLIC BLOOD PRESSURE: 110 MMHG

## 2019-06-15 VITALS — DIASTOLIC BLOOD PRESSURE: 52 MMHG | SYSTOLIC BLOOD PRESSURE: 110 MMHG

## 2019-06-15 LAB
ALBUMIN SERPL-MCNC: 3.3 G/DL (ref 3.4–5)
ANION GAP SERPL CALC-SCNC: 7 MMOL/L (ref 5–15)
APTT BLD: 32 SECONDS (ref 25–31)
BASOPHILS # BLD AUTO: 0.02 X10^3/UL (ref 0–0.1)
BASOPHILS NFR BLD AUTO: 0 % (ref 0–1)
CALCIUM SERPL-MCNC: 8.2 MG/DL (ref 8.5–10.1)
CHLORIDE SERPL-SCNC: 106 MMOL/L (ref 98–107)
CREAT SERPL-MCNC: 1.17 MG/DL (ref 0.7–1.3)
EOSINOPHIL # BLD AUTO: 0 X10^3/UL (ref 0–0.4)
EOSINOPHIL NFR BLD AUTO: 0 % (ref 1–7)
ERYTHROCYTE [DISTWIDTH] IN BLOOD BY AUTOMATED COUNT: 15.2 % (ref 9.4–14.8)
INR PPP: 1.05 (ref 0.93–1.1)
LYMPHOCYTES # BLD AUTO: 0.72 X10^3/UL (ref 1–3.4)
LYMPHOCYTES NFR BLD AUTO: 6 % (ref 22–44)
MCH RBC QN AUTO: 34.1 PG (ref 27.5–34.5)
MCHC RBC AUTO-ENTMCNC: 33.1 G/DL (ref 33.2–36.2)
MCV RBC AUTO: 103 FL (ref 81–97)
MD: (no result)
MONOCYTES # BLD AUTO: 0.91 X10^3/UL (ref 0.2–0.8)
MONOCYTES NFR BLD AUTO: 8 % (ref 2–9)
NEUTROPHILS # BLD AUTO: 10.33 X10^3/UL (ref 1.8–6.8)
NEUTROPHILS NFR BLD AUTO: 86 % (ref 42–75)
O2 FLOW: 4 L/MIN
PLATELET # BLD AUTO: 120 X10^3/UL (ref 130–400)
PMV BLD AUTO: 8.1 FL (ref 7.4–10.4)
PROTHROMBIN TIME: 11 SECONDS (ref 9.6–11.5)
RBC # BLD AUTO: 2.68 X10^6/UL (ref 4.38–5.82)

## 2019-06-15 PROCEDURE — 30233N1 TRANSFUSION OF NONAUTOLOGOUS RED BLOOD CELLS INTO PERIPHERAL VEIN, PERCUTANEOUS APPROACH: ICD-10-PCS | Performed by: THORACIC SURGERY (CARDIOTHORACIC VASCULAR SURGERY)

## 2019-06-15 RX ADMIN — Medication SCH NOTE: at 09:00

## 2019-06-15 RX ADMIN — FUROSEMIDE SCH MG: 10 INJECTION, SOLUTION INTRAVENOUS at 17:17

## 2019-06-15 RX ADMIN — MORPHINE SULFATE PRN MG: 10 INJECTION INTRAVENOUS at 05:19

## 2019-06-15 RX ADMIN — Medication SCH NOTE: at 00:02

## 2019-06-15 RX ADMIN — CEFUROXIME SCH MLS/HR: 1.5 INJECTION, POWDER, FOR SOLUTION INTRAVENOUS at 08:55

## 2019-06-15 RX ADMIN — ASPIRIN SCH MG: 81 TABLET, COATED ORAL at 08:54

## 2019-06-15 RX ADMIN — SODIUM CHLORIDE, PRESERVATIVE FREE SCH ML: 5 INJECTION INTRAVENOUS at 08:54

## 2019-06-15 RX ADMIN — OXYCODONE HYDROCHLORIDE PRN MG: 5 TABLET ORAL at 23:00

## 2019-06-15 RX ADMIN — OXYCODONE HYDROCHLORIDE PRN MG: 5 TABLET ORAL at 19:50

## 2019-06-15 RX ADMIN — ALLOPURINOL SCH MG: 300 TABLET ORAL at 08:54

## 2019-06-15 RX ADMIN — INSULIN LISPRO SCH UNITS: 100 INJECTION, SOLUTION INTRAVENOUS; SUBCUTANEOUS at 07:00

## 2019-06-15 RX ADMIN — MUPIROCIN SCH APPLIC: 20 OINTMENT TOPICAL at 08:55

## 2019-06-15 RX ADMIN — DOCUSATE SODIUM SCH MG: 100 CAPSULE, LIQUID FILLED ORAL at 19:50

## 2019-06-15 RX ADMIN — Medication SCH NOTE: at 04:39

## 2019-06-15 RX ADMIN — DOCUSATE SODIUM SCH MG: 100 CAPSULE, LIQUID FILLED ORAL at 08:54

## 2019-06-15 RX ADMIN — ONDANSETRON PRN MG: 2 INJECTION, SOLUTION INTRAMUSCULAR; INTRAVENOUS at 03:52

## 2019-06-15 RX ADMIN — OXYCODONE HYDROCHLORIDE PRN MG: 5 TABLET ORAL at 09:00

## 2019-06-15 RX ADMIN — CHLORHEXIDINE GLUCONATE 0.12% ORAL RINSE SCH ML: 1.2 LIQUID ORAL at 19:50

## 2019-06-15 RX ADMIN — ONDANSETRON PRN MG: 2 INJECTION, SOLUTION INTRAMUSCULAR; INTRAVENOUS at 22:13

## 2019-06-15 RX ADMIN — FUROSEMIDE SCH MG: 10 INJECTION, SOLUTION INTRAVENOUS at 08:54

## 2019-06-15 RX ADMIN — MAGNESIUM SULFATE HEPTAHYDRATE SCH MLS/HR: 500 INJECTION, SOLUTION INTRAMUSCULAR; INTRAVENOUS at 12:02

## 2019-06-15 RX ADMIN — INSULIN LISPRO SCH UNITS: 100 INJECTION, SOLUTION INTRAVENOUS; SUBCUTANEOUS at 19:50

## 2019-06-15 RX ADMIN — TAMSULOSIN HYDROCHLORIDE SCH MG: 0.4 CAPSULE ORAL at 08:54

## 2019-06-15 RX ADMIN — SODIUM CHLORIDE, PRESERVATIVE FREE SCH ML: 5 INJECTION INTRAVENOUS at 19:50

## 2019-06-15 RX ADMIN — OXYCODONE HYDROCHLORIDE PRN MG: 5 TABLET ORAL at 12:02

## 2019-06-15 RX ADMIN — MORPHINE SULFATE PRN MG: 10 INJECTION INTRAVENOUS at 00:52

## 2019-06-15 RX ADMIN — OXYCODONE HYDROCHLORIDE PRN MG: 5 TABLET ORAL at 03:30

## 2019-06-15 RX ADMIN — VANCOMYCIN HYDROCHLORIDE SCH MLS/HR: 1 INJECTION, POWDER, LYOPHILIZED, FOR SOLUTION INTRAVENOUS at 05:24

## 2019-06-15 RX ADMIN — INSULIN LISPRO SCH UNITS: 100 INJECTION, SOLUTION INTRAVENOUS; SUBCUTANEOUS at 16:00

## 2019-06-15 RX ADMIN — MUPIROCIN SCH APPLIC: 20 OINTMENT TOPICAL at 19:50

## 2019-06-15 RX ADMIN — INSULIN LISPRO SCH UNITS: 100 INJECTION, SOLUTION INTRAVENOUS; SUBCUTANEOUS at 12:09

## 2019-06-15 RX ADMIN — HYDROCODONE BITARTRATE AND ACETAMINOPHEN PRN TAB: 10; 325 TABLET ORAL at 10:50

## 2019-06-16 VITALS — SYSTOLIC BLOOD PRESSURE: 106 MMHG | DIASTOLIC BLOOD PRESSURE: 66 MMHG

## 2019-06-16 VITALS — DIASTOLIC BLOOD PRESSURE: 63 MMHG | SYSTOLIC BLOOD PRESSURE: 111 MMHG

## 2019-06-16 LAB
ANION GAP SERPL CALC-SCNC: 6 MMOL/L (ref 5–15)
APTT BLD: 32 SECONDS (ref 25–31)
BASOPHILS # BLD AUTO: 0 X10^3/UL (ref 0–0.1)
BASOPHILS NFR BLD AUTO: 0 % (ref 0–1)
CALCIUM SERPL-MCNC: 8.3 MG/DL (ref 8.5–10.1)
CHLORIDE SERPL-SCNC: 103 MMOL/L (ref 98–107)
CREAT SERPL-MCNC: 1.08 MG/DL (ref 0.7–1.3)
EOSINOPHIL # BLD AUTO: 0 X10^3/UL (ref 0–0.4)
EOSINOPHIL NFR BLD AUTO: 0 % (ref 1–7)
ERYTHROCYTE [DISTWIDTH] IN BLOOD BY AUTOMATED COUNT: 15.4 % (ref 9.4–14.8)
INR PPP: 0.95 (ref 0.93–1.1)
LYMPHOCYTES # BLD AUTO: 0.55 X10^3/UL (ref 1–3.4)
LYMPHOCYTES NFR BLD AUTO: 4 % (ref 22–44)
MCH RBC QN AUTO: 33.6 PG (ref 27.5–34.5)
MCHC RBC AUTO-ENTMCNC: 33.3 G/DL (ref 33.2–36.2)
MCV RBC AUTO: 101.1 FL (ref 81–97)
MD: (no result)
MONOCYTES # BLD AUTO: 0.72 X10^3/UL (ref 0.2–0.8)
MONOCYTES NFR BLD AUTO: 6 % (ref 2–9)
NEUTROPHILS # BLD AUTO: 11.26 X10^3/UL (ref 1.8–6.8)
NEUTROPHILS NFR BLD AUTO: 90 % (ref 42–75)
O2 FLOW: 2 L/MIN
PLATELET # BLD AUTO: 97 X10^3/UL (ref 130–400)
PMV BLD AUTO: 8.6 FL (ref 7.4–10.4)
PROTHROMBIN TIME: 10 SECONDS (ref 9.6–11.5)
RBC # BLD AUTO: 2.63 X10^6/UL (ref 4.38–5.82)

## 2019-06-16 RX ADMIN — MUPIROCIN SCH APPLIC: 20 OINTMENT TOPICAL at 21:54

## 2019-06-16 RX ADMIN — INSULIN LISPRO SCH UNITS: 100 INJECTION, SOLUTION INTRAVENOUS; SUBCUTANEOUS at 21:00

## 2019-06-16 RX ADMIN — DOCUSATE SODIUM SCH MG: 100 CAPSULE, LIQUID FILLED ORAL at 08:39

## 2019-06-16 RX ADMIN — INSULIN LISPRO SCH UNITS: 100 INJECTION, SOLUTION INTRAVENOUS; SUBCUTANEOUS at 16:00

## 2019-06-16 RX ADMIN — OXYCODONE HYDROCHLORIDE PRN MG: 5 TABLET ORAL at 08:40

## 2019-06-16 RX ADMIN — FUROSEMIDE SCH MG: 10 INJECTION, SOLUTION INTRAVENOUS at 17:25

## 2019-06-16 RX ADMIN — DOCUSATE SODIUM SCH MG: 100 CAPSULE, LIQUID FILLED ORAL at 21:14

## 2019-06-16 RX ADMIN — OXYCODONE HYDROCHLORIDE PRN MG: 5 TABLET ORAL at 21:14

## 2019-06-16 RX ADMIN — CHLORHEXIDINE GLUCONATE 0.12% ORAL RINSE SCH ML: 1.2 LIQUID ORAL at 08:40

## 2019-06-16 RX ADMIN — Medication SCH NOTE: at 10:55

## 2019-06-16 RX ADMIN — SODIUM CHLORIDE, PRESERVATIVE FREE SCH ML: 5 INJECTION INTRAVENOUS at 07:12

## 2019-06-16 RX ADMIN — ASPIRIN SCH MG: 81 TABLET, COATED ORAL at 08:40

## 2019-06-16 RX ADMIN — ALLOPURINOL SCH MG: 300 TABLET ORAL at 08:39

## 2019-06-16 RX ADMIN — TAMSULOSIN HYDROCHLORIDE SCH MG: 0.4 CAPSULE ORAL at 08:39

## 2019-06-16 RX ADMIN — OXYCODONE HYDROCHLORIDE PRN MG: 5 TABLET ORAL at 12:52

## 2019-06-16 RX ADMIN — INSULIN LISPRO SCH UNITS: 100 INJECTION, SOLUTION INTRAVENOUS; SUBCUTANEOUS at 07:00

## 2019-06-16 RX ADMIN — SODIUM CHLORIDE, PRESERVATIVE FREE SCH ML: 5 INJECTION INTRAVENOUS at 08:40

## 2019-06-16 RX ADMIN — SODIUM CHLORIDE, PRESERVATIVE FREE SCH ML: 5 INJECTION INTRAVENOUS at 21:15

## 2019-06-16 RX ADMIN — MAGNESIUM SULFATE HEPTAHYDRATE SCH MLS/HR: 500 INJECTION, SOLUTION INTRAMUSCULAR; INTRAVENOUS at 16:18

## 2019-06-16 RX ADMIN — OXYCODONE HYDROCHLORIDE PRN MG: 5 TABLET ORAL at 02:11

## 2019-06-16 RX ADMIN — BENAZEPRIL HYDROCHLORIDE SCH MG: 20 TABLET, COATED ORAL at 08:39

## 2019-06-16 RX ADMIN — FUROSEMIDE SCH MG: 10 INJECTION, SOLUTION INTRAVENOUS at 08:40

## 2019-06-16 RX ADMIN — Medication SCH NOTE: at 09:00

## 2019-06-16 RX ADMIN — OXYCODONE HYDROCHLORIDE PRN MG: 5 TABLET ORAL at 05:04

## 2019-06-16 RX ADMIN — SODIUM CHLORIDE, PRESERVATIVE FREE SCH ML: 5 INJECTION INTRAVENOUS at 21:00

## 2019-06-16 RX ADMIN — INSULIN LISPRO SCH UNITS: 100 INJECTION, SOLUTION INTRAVENOUS; SUBCUTANEOUS at 11:00

## 2019-06-16 RX ADMIN — CHLORHEXIDINE GLUCONATE 0.12% ORAL RINSE SCH ML: 1.2 LIQUID ORAL at 21:14

## 2019-06-16 RX ADMIN — MUPIROCIN SCH APPLIC: 20 OINTMENT TOPICAL at 08:40

## 2019-06-16 RX ADMIN — OXYCODONE HYDROCHLORIDE PRN MG: 5 TABLET ORAL at 17:24

## 2019-06-17 VITALS — SYSTOLIC BLOOD PRESSURE: 101 MMHG | DIASTOLIC BLOOD PRESSURE: 64 MMHG

## 2019-06-17 VITALS — DIASTOLIC BLOOD PRESSURE: 76 MMHG | SYSTOLIC BLOOD PRESSURE: 143 MMHG

## 2019-06-17 VITALS — DIASTOLIC BLOOD PRESSURE: 74 MMHG | SYSTOLIC BLOOD PRESSURE: 146 MMHG

## 2019-06-17 VITALS — DIASTOLIC BLOOD PRESSURE: 58 MMHG | SYSTOLIC BLOOD PRESSURE: 121 MMHG

## 2019-06-17 VITALS — SYSTOLIC BLOOD PRESSURE: 104 MMHG | DIASTOLIC BLOOD PRESSURE: 58 MMHG

## 2019-06-17 LAB
ANION GAP SERPL CALC-SCNC: 3 MMOL/L (ref 5–15)
BASOPHILS # BLD AUTO: 0.01 X10^3/UL (ref 0–0.1)
BASOPHILS NFR BLD AUTO: 0 % (ref 0–1)
CALCIUM SERPL-MCNC: 8.2 MG/DL (ref 8.5–10.1)
CHLORIDE SERPL-SCNC: 96 MMOL/L (ref 98–107)
CREAT SERPL-MCNC: 1.05 MG/DL (ref 0.7–1.3)
EOSINOPHIL # BLD AUTO: 0.01 X10^3/UL (ref 0–0.4)
EOSINOPHIL NFR BLD AUTO: 0 % (ref 1–7)
ERYTHROCYTE [DISTWIDTH] IN BLOOD BY AUTOMATED COUNT: 14.5 % (ref 9.4–14.8)
INR PPP: 0.97 (ref 0.93–1.1)
LYMPHOCYTES # BLD AUTO: 0.52 X10^3/UL (ref 1–3.4)
LYMPHOCYTES NFR BLD AUTO: 4 % (ref 22–44)
MCH RBC QN AUTO: 34.4 PG (ref 27.5–34.5)
MCHC RBC AUTO-ENTMCNC: 33.5 G/DL (ref 33.2–36.2)
MCV RBC AUTO: 102.7 FL (ref 81–97)
MD: (no result)
MONOCYTES # BLD AUTO: 0.87 X10^3/UL (ref 0.2–0.8)
MONOCYTES NFR BLD AUTO: 7 % (ref 2–9)
NEUTROPHILS # BLD AUTO: 10.36 X10^3/UL (ref 1.8–6.8)
NEUTROPHILS NFR BLD AUTO: 88 % (ref 42–75)
PLATELET # BLD AUTO: 92 X10^3/UL (ref 130–400)
PMV BLD AUTO: 7.5 FL (ref 7.4–10.4)
PROTHROMBIN TIME: 10.2 SECONDS (ref 9.6–11.5)
RBC # BLD AUTO: 2.52 X10^6/UL (ref 4.38–5.82)

## 2019-06-17 RX ADMIN — HYDROCODONE BITARTRATE AND ACETAMINOPHEN PRN TAB: 5; 325 TABLET ORAL at 16:14

## 2019-06-17 RX ADMIN — ACETAMINOPHEN PRN MG: 325 TABLET, FILM COATED ORAL at 20:37

## 2019-06-17 RX ADMIN — METOPROLOL TARTRATE SCH MG: 25 TABLET, FILM COATED ORAL at 17:27

## 2019-06-17 RX ADMIN — BENAZEPRIL HYDROCHLORIDE SCH MG: 20 TABLET, COATED ORAL at 08:47

## 2019-06-17 RX ADMIN — Medication SCH NOTE: at 08:47

## 2019-06-17 RX ADMIN — OXYCODONE HYDROCHLORIDE PRN MG: 5 TABLET ORAL at 04:51

## 2019-06-17 RX ADMIN — INSULIN LISPRO SCH UNITS: 100 INJECTION, SOLUTION INTRAVENOUS; SUBCUTANEOUS at 11:00

## 2019-06-17 RX ADMIN — ASPIRIN SCH MG: 81 TABLET, COATED ORAL at 08:46

## 2019-06-17 RX ADMIN — OXYCODONE HYDROCHLORIDE PRN MG: 5 TABLET ORAL at 20:37

## 2019-06-17 RX ADMIN — ACETAMINOPHEN PRN MG: 325 TABLET, FILM COATED ORAL at 04:51

## 2019-06-17 RX ADMIN — OXYCODONE HYDROCHLORIDE PRN MG: 5 TABLET ORAL at 08:46

## 2019-06-17 RX ADMIN — TAMSULOSIN HYDROCHLORIDE SCH MG: 0.4 CAPSULE ORAL at 08:46

## 2019-06-17 RX ADMIN — SODIUM CHLORIDE, PRESERVATIVE FREE SCH ML: 5 INJECTION INTRAVENOUS at 20:38

## 2019-06-17 RX ADMIN — OXYCODONE HYDROCHLORIDE PRN MG: 5 TABLET ORAL at 13:16

## 2019-06-17 RX ADMIN — FUROSEMIDE SCH MG: 10 INJECTION, SOLUTION INTRAMUSCULAR; INTRAVENOUS at 07:57

## 2019-06-17 RX ADMIN — METOPROLOL TARTRATE SCH MG: 25 TABLET, FILM COATED ORAL at 07:55

## 2019-06-17 RX ADMIN — OXYCODONE HYDROCHLORIDE PRN MG: 5 TABLET ORAL at 00:34

## 2019-06-17 RX ADMIN — INSULIN LISPRO SCH UNITS: 100 INJECTION, SOLUTION INTRAVENOUS; SUBCUTANEOUS at 20:37

## 2019-06-17 RX ADMIN — INSULIN LISPRO SCH UNITS: 100 INJECTION, SOLUTION INTRAVENOUS; SUBCUTANEOUS at 16:00

## 2019-06-17 RX ADMIN — SODIUM CHLORIDE, PRESERVATIVE FREE SCH ML: 5 INJECTION INTRAVENOUS at 08:45

## 2019-06-17 RX ADMIN — DOCUSATE SODIUM SCH MG: 100 CAPSULE, LIQUID FILLED ORAL at 08:46

## 2019-06-17 RX ADMIN — POTASSIUM CHLORIDE SCH MEQ: 20 TABLET, EXTENDED RELEASE ORAL at 16:23

## 2019-06-17 RX ADMIN — CHLORHEXIDINE GLUCONATE 0.12% ORAL RINSE SCH ML: 1.2 LIQUID ORAL at 08:45

## 2019-06-17 RX ADMIN — MUPIROCIN SCH APPLIC: 20 OINTMENT TOPICAL at 08:45

## 2019-06-17 RX ADMIN — OXYCODONE HYDROCHLORIDE PRN MG: 5 TABLET ORAL at 17:27

## 2019-06-17 RX ADMIN — FUROSEMIDE SCH MG: 10 INJECTION, SOLUTION INTRAMUSCULAR; INTRAVENOUS at 16:23

## 2019-06-17 RX ADMIN — ALLOPURINOL SCH MG: 300 TABLET ORAL at 08:46

## 2019-06-17 RX ADMIN — ACETAMINOPHEN PRN MG: 325 TABLET, FILM COATED ORAL at 00:34

## 2019-06-17 RX ADMIN — GUAIFENESIN SCH MG: 600 TABLET, EXTENDED RELEASE ORAL at 20:37

## 2019-06-17 RX ADMIN — GUAIFENESIN SCH MG: 600 TABLET, EXTENDED RELEASE ORAL at 08:48

## 2019-06-17 RX ADMIN — GUAIFENESIN SCH MG: 600 TABLET, EXTENDED RELEASE ORAL at 00:10

## 2019-06-17 RX ADMIN — DOCUSATE SODIUM SCH MG: 100 CAPSULE, LIQUID FILLED ORAL at 20:37

## 2019-06-17 RX ADMIN — POTASSIUM CHLORIDE SCH MEQ: 20 TABLET, EXTENDED RELEASE ORAL at 07:55

## 2019-06-17 RX ADMIN — INSULIN LISPRO SCH UNITS: 100 INJECTION, SOLUTION INTRAVENOUS; SUBCUTANEOUS at 07:00

## 2019-06-17 RX ADMIN — MUPIROCIN SCH APPLIC: 20 OINTMENT TOPICAL at 20:38

## 2019-06-17 RX ADMIN — Medication SCH NOTE: at 11:34

## 2019-06-18 VITALS — SYSTOLIC BLOOD PRESSURE: 91 MMHG | DIASTOLIC BLOOD PRESSURE: 60 MMHG

## 2019-06-18 VITALS — SYSTOLIC BLOOD PRESSURE: 109 MMHG | DIASTOLIC BLOOD PRESSURE: 74 MMHG

## 2019-06-18 VITALS — SYSTOLIC BLOOD PRESSURE: 94 MMHG | DIASTOLIC BLOOD PRESSURE: 61 MMHG

## 2019-06-18 VITALS — SYSTOLIC BLOOD PRESSURE: 111 MMHG | DIASTOLIC BLOOD PRESSURE: 75 MMHG

## 2019-06-18 VITALS — DIASTOLIC BLOOD PRESSURE: 64 MMHG | SYSTOLIC BLOOD PRESSURE: 111 MMHG

## 2019-06-18 VITALS — SYSTOLIC BLOOD PRESSURE: 143 MMHG | DIASTOLIC BLOOD PRESSURE: 75 MMHG

## 2019-06-18 VITALS — DIASTOLIC BLOOD PRESSURE: 63 MMHG | SYSTOLIC BLOOD PRESSURE: 95 MMHG

## 2019-06-18 VITALS — DIASTOLIC BLOOD PRESSURE: 63 MMHG | SYSTOLIC BLOOD PRESSURE: 102 MMHG

## 2019-06-18 VITALS — DIASTOLIC BLOOD PRESSURE: 76 MMHG | SYSTOLIC BLOOD PRESSURE: 153 MMHG

## 2019-06-18 VITALS — SYSTOLIC BLOOD PRESSURE: 91 MMHG | DIASTOLIC BLOOD PRESSURE: 62 MMHG

## 2019-06-18 LAB
ANION GAP SERPL CALC-SCNC: 5 MMOL/L (ref 5–15)
BASOPHILS # BLD AUTO: 0 X10^3/UL (ref 0–0.1)
BASOPHILS NFR BLD AUTO: 0 % (ref 0–1)
CALCIUM SERPL-MCNC: 8.3 MG/DL (ref 8.5–10.1)
CHLORIDE SERPL-SCNC: 96 MMOL/L (ref 98–107)
CREAT SERPL-MCNC: 1.06 MG/DL (ref 0.7–1.3)
EOSINOPHIL # BLD AUTO: 0.04 X10^3/UL (ref 0–0.4)
EOSINOPHIL NFR BLD AUTO: 0 % (ref 1–7)
ERYTHROCYTE [DISTWIDTH] IN BLOOD BY AUTOMATED COUNT: 14.4 % (ref 9.4–14.8)
INR PPP: 0.96 (ref 0.93–1.1)
LYMPHOCYTES # BLD AUTO: 0.48 X10^3/UL (ref 1–3.4)
LYMPHOCYTES NFR BLD AUTO: 5 % (ref 22–44)
MCH RBC QN AUTO: 34.4 PG (ref 27.5–34.5)
MCHC RBC AUTO-ENTMCNC: 33.3 G/DL (ref 33.2–36.2)
MCV RBC AUTO: 103.4 FL (ref 81–97)
MD: NO
MONOCYTES # BLD AUTO: 0.92 X10^3/UL (ref 0.2–0.8)
MONOCYTES NFR BLD AUTO: 10 % (ref 2–9)
NEUTROPHILS # BLD AUTO: 8.15 X10^3/UL (ref 1.8–6.8)
NEUTROPHILS NFR BLD AUTO: 85 % (ref 42–75)
PLATELET # BLD AUTO: 120 X10^3/UL (ref 130–400)
PMV BLD AUTO: 8.2 FL (ref 7.4–10.4)
PROTHROMBIN TIME: 10.1 SECONDS (ref 9.6–11.5)
RBC # BLD AUTO: 2.5 X10^6/UL (ref 4.38–5.82)

## 2019-06-18 RX ADMIN — GUAIFENESIN SCH MG: 600 TABLET, EXTENDED RELEASE ORAL at 08:27

## 2019-06-18 RX ADMIN — HYDROCODONE BITARTRATE AND ACETAMINOPHEN PRN TAB: 10; 325 TABLET ORAL at 23:11

## 2019-06-18 RX ADMIN — SODIUM CHLORIDE, PRESERVATIVE FREE SCH ML: 5 INJECTION INTRAVENOUS at 21:26

## 2019-06-18 RX ADMIN — METOPROLOL TARTRATE SCH MG: 25 TABLET, FILM COATED ORAL at 17:04

## 2019-06-18 RX ADMIN — DOCUSATE SODIUM SCH MG: 100 CAPSULE, LIQUID FILLED ORAL at 21:26

## 2019-06-18 RX ADMIN — GUAIFENESIN SCH MG: 600 TABLET, EXTENDED RELEASE ORAL at 21:26

## 2019-06-18 RX ADMIN — FUROSEMIDE SCH MG: 10 INJECTION, SOLUTION INTRAMUSCULAR; INTRAVENOUS at 08:26

## 2019-06-18 RX ADMIN — OXYCODONE HYDROCHLORIDE PRN MG: 5 TABLET ORAL at 01:00

## 2019-06-18 RX ADMIN — Medication SCH NOTE: at 08:40

## 2019-06-18 RX ADMIN — BENAZEPRIL HYDROCHLORIDE SCH MG: 20 TABLET, COATED ORAL at 08:27

## 2019-06-18 RX ADMIN — OXYCODONE HYDROCHLORIDE PRN MG: 5 TABLET ORAL at 09:18

## 2019-06-18 RX ADMIN — SODIUM CHLORIDE, PRESERVATIVE FREE SCH ML: 5 INJECTION INTRAVENOUS at 08:27

## 2019-06-18 RX ADMIN — FUROSEMIDE SCH MG: 10 INJECTION, SOLUTION INTRAMUSCULAR; INTRAVENOUS at 17:04

## 2019-06-18 RX ADMIN — POTASSIUM CHLORIDE SCH MEQ: 20 TABLET, EXTENDED RELEASE ORAL at 08:26

## 2019-06-18 RX ADMIN — OXYCODONE HYDROCHLORIDE PRN MG: 5 TABLET ORAL at 12:20

## 2019-06-18 RX ADMIN — POTASSIUM CHLORIDE SCH MEQ: 20 TABLET, EXTENDED RELEASE ORAL at 17:05

## 2019-06-18 RX ADMIN — MUPIROCIN SCH APPLIC: 20 OINTMENT TOPICAL at 08:39

## 2019-06-18 RX ADMIN — OXYCODONE HYDROCHLORIDE PRN MG: 5 TABLET ORAL at 15:29

## 2019-06-18 RX ADMIN — Medication SCH NOTE: at 08:39

## 2019-06-18 RX ADMIN — AMIODARONE HYDROCHLORIDE PRN MLS/HR: 50 INJECTION, SOLUTION INTRAVENOUS at 14:35

## 2019-06-18 RX ADMIN — ASPIRIN SCH MG: 81 TABLET, COATED ORAL at 08:26

## 2019-06-18 RX ADMIN — SODIUM CHLORIDE, PRESERVATIVE FREE SCH ML: 5 INJECTION INTRAVENOUS at 21:00

## 2019-06-18 RX ADMIN — METOPROLOL TARTRATE SCH MG: 25 TABLET, FILM COATED ORAL at 05:32

## 2019-06-18 RX ADMIN — Medication PRN EACH: at 14:47

## 2019-06-18 RX ADMIN — ALLOPURINOL SCH MG: 300 TABLET ORAL at 08:26

## 2019-06-18 RX ADMIN — OXYCODONE HYDROCHLORIDE PRN MG: 5 TABLET ORAL at 05:34

## 2019-06-18 RX ADMIN — DOCUSATE SODIUM SCH MG: 100 CAPSULE, LIQUID FILLED ORAL at 08:26

## 2019-06-18 RX ADMIN — TAMSULOSIN HYDROCHLORIDE SCH MG: 0.4 CAPSULE ORAL at 08:26

## 2019-06-18 RX ADMIN — MUPIROCIN SCH APPLIC: 20 OINTMENT TOPICAL at 21:00

## 2019-06-18 RX ADMIN — OXYCODONE HYDROCHLORIDE PRN MG: 5 TABLET ORAL at 23:19

## 2019-06-19 VITALS — DIASTOLIC BLOOD PRESSURE: 74 MMHG | SYSTOLIC BLOOD PRESSURE: 119 MMHG

## 2019-06-19 VITALS — DIASTOLIC BLOOD PRESSURE: 71 MMHG | SYSTOLIC BLOOD PRESSURE: 119 MMHG

## 2019-06-19 VITALS — SYSTOLIC BLOOD PRESSURE: 113 MMHG | DIASTOLIC BLOOD PRESSURE: 70 MMHG

## 2019-06-19 VITALS — SYSTOLIC BLOOD PRESSURE: 154 MMHG | DIASTOLIC BLOOD PRESSURE: 82 MMHG

## 2019-06-19 LAB
ANION GAP SERPL CALC-SCNC: 5 MMOL/L (ref 5–15)
BASOPHILS # BLD AUTO: 0 X10^3/UL (ref 0–0.1)
BASOPHILS NFR BLD AUTO: 0 % (ref 0–1)
CALCIUM SERPL-MCNC: 8.6 MG/DL (ref 8.5–10.1)
CHLORIDE SERPL-SCNC: 94 MMOL/L (ref 98–107)
CREAT SERPL-MCNC: 1.08 MG/DL (ref 0.7–1.3)
EOSINOPHIL # BLD AUTO: 0.05 X10^3/UL (ref 0–0.4)
EOSINOPHIL NFR BLD AUTO: 1 % (ref 1–7)
ERYTHROCYTE [DISTWIDTH] IN BLOOD BY AUTOMATED COUNT: 14.2 % (ref 9.4–14.8)
INR PPP: 0.98 (ref 0.93–1.1)
LYMPHOCYTES # BLD AUTO: 0.78 X10^3/UL (ref 1–3.4)
LYMPHOCYTES NFR BLD AUTO: 8 % (ref 22–44)
MCH RBC QN AUTO: 34.5 PG (ref 27.5–34.5)
MCHC RBC AUTO-ENTMCNC: 33.7 G/DL (ref 33.2–36.2)
MCV RBC AUTO: 102.5 FL (ref 81–97)
MD: NO
MONOCYTES # BLD AUTO: 0.89 X10^3/UL (ref 0.2–0.8)
MONOCYTES NFR BLD AUTO: 10 % (ref 2–9)
NEUTROPHILS # BLD AUTO: 7.59 X10^3/UL (ref 1.8–6.8)
NEUTROPHILS NFR BLD AUTO: 82 % (ref 42–75)
PLATELET # BLD AUTO: 174 X10^3/UL (ref 130–400)
PMV BLD AUTO: 8.4 FL (ref 7.4–10.4)
PROTHROMBIN TIME: 10.3 SECONDS (ref 9.6–11.5)
RBC # BLD AUTO: 2.88 X10^6/UL (ref 4.38–5.82)

## 2019-06-19 RX ADMIN — HYDROCODONE BITARTRATE AND ACETAMINOPHEN PRN TAB: 5; 325 TABLET ORAL at 07:54

## 2019-06-19 RX ADMIN — POTASSIUM CHLORIDE SCH MEQ: 20 TABLET, EXTENDED RELEASE ORAL at 20:40

## 2019-06-19 RX ADMIN — SODIUM CHLORIDE, PRESERVATIVE FREE SCH ML: 5 INJECTION INTRAVENOUS at 09:00

## 2019-06-19 RX ADMIN — MUPIROCIN SCH APPLIC: 20 OINTMENT TOPICAL at 09:46

## 2019-06-19 RX ADMIN — TAMSULOSIN HYDROCHLORIDE SCH MG: 0.4 CAPSULE ORAL at 09:47

## 2019-06-19 RX ADMIN — METOPROLOL TARTRATE SCH MG: 25 TABLET, FILM COATED ORAL at 18:51

## 2019-06-19 RX ADMIN — OXYCODONE HYDROCHLORIDE PRN MG: 5 TABLET ORAL at 08:54

## 2019-06-19 RX ADMIN — METOPROLOL TARTRATE SCH MG: 25 TABLET, FILM COATED ORAL at 05:11

## 2019-06-19 RX ADMIN — SODIUM CHLORIDE, PRESERVATIVE FREE SCH ML: 5 INJECTION INTRAVENOUS at 09:47

## 2019-06-19 RX ADMIN — BENAZEPRIL HYDROCHLORIDE SCH MG: 20 TABLET, COATED ORAL at 09:48

## 2019-06-19 RX ADMIN — OXYCODONE HYDROCHLORIDE PRN MG: 5 TABLET ORAL at 13:44

## 2019-06-19 RX ADMIN — POTASSIUM CHLORIDE SCH MEQ: 20 TABLET, EXTENDED RELEASE ORAL at 09:47

## 2019-06-19 RX ADMIN — ASPIRIN SCH MG: 81 TABLET, COATED ORAL at 09:47

## 2019-06-19 RX ADMIN — Medication SCH NOTE: at 10:20

## 2019-06-19 RX ADMIN — AMIODARONE HYDROCHLORIDE PRN MLS/HR: 50 INJECTION, SOLUTION INTRAVENOUS at 17:45

## 2019-06-19 RX ADMIN — OXYCODONE HYDROCHLORIDE PRN MG: 5 TABLET ORAL at 18:51

## 2019-06-19 RX ADMIN — ALLOPURINOL SCH MG: 300 TABLET ORAL at 09:47

## 2019-06-19 RX ADMIN — SODIUM CHLORIDE, PRESERVATIVE FREE SCH ML: 5 INJECTION INTRAVENOUS at 20:39

## 2019-06-19 RX ADMIN — SODIUM CHLORIDE, PRESERVATIVE FREE SCH ML: 5 INJECTION INTRAVENOUS at 20:40

## 2019-06-19 RX ADMIN — DOCUSATE SODIUM SCH MG: 100 CAPSULE, LIQUID FILLED ORAL at 09:47

## 2019-06-19 RX ADMIN — OXYCODONE HYDROCHLORIDE PRN MG: 5 TABLET ORAL at 22:15

## 2019-06-19 RX ADMIN — DOCUSATE SODIUM SCH MG: 100 CAPSULE, LIQUID FILLED ORAL at 20:38

## 2019-06-19 RX ADMIN — DOXYCYCLINE HYCLATE SCH MG: 100 TABLET, FILM COATED ORAL at 20:38

## 2019-06-19 RX ADMIN — FUROSEMIDE SCH MG: 10 INJECTION, SOLUTION INTRAMUSCULAR; INTRAVENOUS at 07:54

## 2019-06-19 RX ADMIN — DOXYCYCLINE HYCLATE SCH MG: 100 TABLET, FILM COATED ORAL at 09:47

## 2019-06-19 RX ADMIN — Medication PRN EACH: at 17:44

## 2019-06-19 RX ADMIN — Medication SCH NOTE: at 09:00

## 2019-06-19 RX ADMIN — GUAIFENESIN SCH MG: 600 TABLET, EXTENDED RELEASE ORAL at 20:38

## 2019-06-19 RX ADMIN — GUAIFENESIN SCH MG: 600 TABLET, EXTENDED RELEASE ORAL at 09:47

## 2019-06-20 VITALS — DIASTOLIC BLOOD PRESSURE: 67 MMHG | SYSTOLIC BLOOD PRESSURE: 103 MMHG

## 2019-06-20 VITALS — DIASTOLIC BLOOD PRESSURE: 63 MMHG | SYSTOLIC BLOOD PRESSURE: 108 MMHG

## 2019-06-20 VITALS — SYSTOLIC BLOOD PRESSURE: 106 MMHG | DIASTOLIC BLOOD PRESSURE: 65 MMHG

## 2019-06-20 VITALS — DIASTOLIC BLOOD PRESSURE: 67 MMHG | SYSTOLIC BLOOD PRESSURE: 107 MMHG

## 2019-06-20 VITALS — SYSTOLIC BLOOD PRESSURE: 109 MMHG | DIASTOLIC BLOOD PRESSURE: 68 MMHG

## 2019-06-20 LAB
ANION GAP SERPL CALC-SCNC: 4 MMOL/L (ref 5–15)
CALCIUM SERPL-MCNC: 8.6 MG/DL (ref 8.5–10.1)
CHLORIDE SERPL-SCNC: 96 MMOL/L (ref 98–107)
CREAT SERPL-MCNC: 1.64 MG/DL (ref 0.7–1.3)
INR PPP: 1.03 (ref 0.93–1.1)
PROTHROMBIN TIME: 10.8 SECONDS (ref 9.6–11.5)

## 2019-06-20 RX ADMIN — OXYCODONE HYDROCHLORIDE PRN MG: 5 TABLET ORAL at 05:54

## 2019-06-20 RX ADMIN — Medication SCH NOTE: at 11:35

## 2019-06-20 RX ADMIN — AMIODARONE HYDROCHLORIDE SCH MG: 200 TABLET ORAL at 20:56

## 2019-06-20 RX ADMIN — ALLOPURINOL SCH MG: 300 TABLET ORAL at 09:42

## 2019-06-20 RX ADMIN — DOCUSATE SODIUM SCH MG: 100 CAPSULE, LIQUID FILLED ORAL at 20:57

## 2019-06-20 RX ADMIN — OXYCODONE HYDROCHLORIDE PRN MG: 5 TABLET ORAL at 21:06

## 2019-06-20 RX ADMIN — Medication SCH NOTE: at 09:00

## 2019-06-20 RX ADMIN — BENAZEPRIL HYDROCHLORIDE SCH MG: 10 TABLET, FILM COATED ORAL at 09:42

## 2019-06-20 RX ADMIN — DOXYCYCLINE HYCLATE SCH MG: 100 TABLET, FILM COATED ORAL at 20:56

## 2019-06-20 RX ADMIN — GUAIFENESIN SCH MG: 600 TABLET, EXTENDED RELEASE ORAL at 20:57

## 2019-06-20 RX ADMIN — DOCUSATE SODIUM SCH MG: 100 CAPSULE, LIQUID FILLED ORAL at 09:41

## 2019-06-20 RX ADMIN — OXYCODONE HYDROCHLORIDE PRN MG: 5 TABLET ORAL at 13:54

## 2019-06-20 RX ADMIN — OXYCODONE HYDROCHLORIDE PRN MG: 5 TABLET ORAL at 17:28

## 2019-06-20 RX ADMIN — METOPROLOL TARTRATE SCH MG: 25 TABLET, FILM COATED ORAL at 17:28

## 2019-06-20 RX ADMIN — TAMSULOSIN HYDROCHLORIDE SCH MG: 0.4 CAPSULE ORAL at 09:41

## 2019-06-20 RX ADMIN — AMIODARONE HYDROCHLORIDE SCH MG: 200 TABLET ORAL at 09:41

## 2019-06-20 RX ADMIN — GUAIFENESIN SCH MG: 600 TABLET, EXTENDED RELEASE ORAL at 09:42

## 2019-06-20 RX ADMIN — SODIUM CHLORIDE, PRESERVATIVE FREE SCH ML: 5 INJECTION INTRAVENOUS at 09:00

## 2019-06-20 RX ADMIN — DOXYCYCLINE HYCLATE SCH MG: 100 TABLET, FILM COATED ORAL at 09:42

## 2019-06-20 RX ADMIN — OXYCODONE HYDROCHLORIDE PRN MG: 5 TABLET ORAL at 09:41

## 2019-06-20 RX ADMIN — SODIUM CHLORIDE, PRESERVATIVE FREE SCH ML: 5 INJECTION INTRAVENOUS at 20:57

## 2019-06-20 RX ADMIN — METOPROLOL TARTRATE SCH MG: 25 TABLET, FILM COATED ORAL at 05:55

## 2019-06-20 RX ADMIN — ASPIRIN SCH MG: 81 TABLET, COATED ORAL at 09:41

## 2019-06-21 VITALS — SYSTOLIC BLOOD PRESSURE: 105 MMHG | DIASTOLIC BLOOD PRESSURE: 62 MMHG

## 2019-06-21 VITALS — DIASTOLIC BLOOD PRESSURE: 67 MMHG | SYSTOLIC BLOOD PRESSURE: 124 MMHG

## 2019-06-21 LAB
ANION GAP SERPL CALC-SCNC: 4 MMOL/L (ref 5–15)
CALCIUM SERPL-MCNC: 8.8 MG/DL (ref 8.5–10.1)
CHLORIDE SERPL-SCNC: 96 MMOL/L (ref 98–107)
CREAT SERPL-MCNC: 1.29 MG/DL (ref 0.7–1.3)
INR PPP: 1.1 (ref 0.93–1.1)
PROTHROMBIN TIME: 11.5 SECONDS (ref 9.6–11.5)

## 2019-06-21 RX ADMIN — GUAIFENESIN SCH MG: 600 TABLET, EXTENDED RELEASE ORAL at 08:34

## 2019-06-21 RX ADMIN — SODIUM CHLORIDE, PRESERVATIVE FREE SCH ML: 5 INJECTION INTRAVENOUS at 08:35

## 2019-06-21 RX ADMIN — OXYCODONE HYDROCHLORIDE PRN MG: 5 TABLET ORAL at 00:30

## 2019-06-21 RX ADMIN — TAMSULOSIN HYDROCHLORIDE SCH MG: 0.4 CAPSULE ORAL at 08:34

## 2019-06-21 RX ADMIN — OXYCODONE HYDROCHLORIDE PRN MG: 5 TABLET ORAL at 15:37

## 2019-06-21 RX ADMIN — METOPROLOL TARTRATE SCH MG: 25 TABLET, FILM COATED ORAL at 05:51

## 2019-06-21 RX ADMIN — BENAZEPRIL HYDROCHLORIDE SCH MG: 10 TABLET, FILM COATED ORAL at 08:34

## 2019-06-21 RX ADMIN — DOCUSATE SODIUM SCH MG: 100 CAPSULE, LIQUID FILLED ORAL at 08:34

## 2019-06-21 RX ADMIN — OXYCODONE HYDROCHLORIDE PRN MG: 5 TABLET ORAL at 08:34

## 2019-06-21 RX ADMIN — ALLOPURINOL SCH MG: 300 TABLET ORAL at 08:34

## 2019-06-21 RX ADMIN — DOXYCYCLINE HYCLATE SCH MG: 100 TABLET, FILM COATED ORAL at 08:34

## 2019-06-21 RX ADMIN — ASPIRIN SCH MG: 81 TABLET, COATED ORAL at 08:34

## 2019-06-21 RX ADMIN — AMIODARONE HYDROCHLORIDE SCH MG: 200 TABLET ORAL at 08:34

## 2019-06-21 RX ADMIN — OXYCODONE HYDROCHLORIDE PRN MG: 5 TABLET ORAL at 03:41

## 2019-06-21 RX ADMIN — METOPROLOL TARTRATE SCH MG: 25 TABLET, FILM COATED ORAL at 17:39

## 2019-06-25 ENCOUNTER — ANTICOAGULATION MONITORING (OUTPATIENT)
Dept: MEDICAL GROUP | Facility: MEDICAL CENTER | Age: 74
End: 2019-06-25

## 2019-06-25 DIAGNOSIS — Z79.01 CHRONIC ANTICOAGULATION: ICD-10-CM

## 2019-06-25 DIAGNOSIS — I48.91 ATRIAL FIBRILLATION, UNSPECIFIED TYPE (HCC): ICD-10-CM

## 2019-06-25 LAB — INR PPP: 2.7 (ref 2–3.5)

## 2019-06-25 NOTE — PROGRESS NOTES
Anticoagulation Summary  As of 2019    INR goal:   2.0-3.0   TTR:   50.5 % (1.5 y)   INR used for dosin.70 (2019)   Warfarin maintenance plan:   10 mg (5 mg x 2) every Mon, Fri; 7.5 mg (5 mg x 1.5) all other days   Weekly warfarin total:   57.5 mg   Plan last modified:   Connor GaldamezD (2019)   Next INR check:   2019   Target end date:   Indefinite    Indications    Deep vein thrombosis (CMS-HCC) [I82.409] [I82.409]  Pulmonary embolism (CMS-HCC) [I26.99] [I26.99]  Atrial fibrillation (CMS-HCC) [I48.91] [I48.91]  Chronic anticoagulation [Z79.01]             Anticoagulation Episode Summary     [facility]   [facility]    Preferred lab:       Send INR reminders to:       Comments:   not able to see at [facility]       Anticoagulation Care Providers     Provider Role Specialty Phone number    Graciela Andersen M.D. Responsible Family Medicine [phone]    [facility] Responsible  [phone]        Anticoagulation Patient Findings    Left voicemail message to report a therapeutic INR of 2.7.  Pt to continue with current warfarin dosing regimen. Requested pt contact the clinic for any s/s of unusual bleeding, bruising, clotting or any changes to diet or medication. FU 2 weeks.  Alexander Gonzalez, ConnorD, BCACP

## 2019-06-26 ENCOUNTER — HOSPITAL ENCOUNTER (EMERGENCY)
Dept: HOSPITAL 8 - ED | Age: 74
Discharge: HOME | End: 2019-06-26
Payer: MEDICARE

## 2019-06-26 VITALS — DIASTOLIC BLOOD PRESSURE: 76 MMHG | SYSTOLIC BLOOD PRESSURE: 134 MMHG

## 2019-06-26 VITALS — HEIGHT: 73 IN | WEIGHT: 189.82 LBS | BODY MASS INDEX: 25.16 KG/M2

## 2019-06-26 DIAGNOSIS — F17.200: ICD-10-CM

## 2019-06-26 DIAGNOSIS — Z86.711: ICD-10-CM

## 2019-06-26 DIAGNOSIS — I48.92: ICD-10-CM

## 2019-06-26 DIAGNOSIS — Z86.718: ICD-10-CM

## 2019-06-26 DIAGNOSIS — R06.00: Primary | ICD-10-CM

## 2019-06-26 LAB
ALBUMIN SERPL-MCNC: 3.2 G/DL (ref 3.4–5)
ALP SERPL-CCNC: 92 U/L (ref 45–117)
ALT SERPL-CCNC: 27 U/L (ref 12–78)
ANION GAP SERPL CALC-SCNC: 9 MMOL/L (ref 5–15)
BASOPHILS # BLD AUTO: 0.05 X10^3/UL (ref 0–0.1)
BASOPHILS NFR BLD AUTO: 0 % (ref 0–1)
BILIRUB SERPL-MCNC: 0.7 MG/DL (ref 0.2–1)
CALCIUM SERPL-MCNC: 8.6 MG/DL (ref 8.5–10.1)
CHLORIDE SERPL-SCNC: 101 MMOL/L (ref 98–107)
CREAT SERPL-MCNC: 1.53 MG/DL (ref 0.7–1.3)
EOSINOPHIL # BLD AUTO: 0.02 X10^3/UL (ref 0–0.4)
EOSINOPHIL NFR BLD AUTO: 0 % (ref 1–7)
ERYTHROCYTE [DISTWIDTH] IN BLOOD BY AUTOMATED COUNT: 14.9 % (ref 9.4–14.8)
INR PPP: 2.03 (ref 0.93–1.1)
LYMPHOCYTES # BLD AUTO: 0.69 X10^3/UL (ref 1–3.4)
LYMPHOCYTES NFR BLD AUTO: 5 % (ref 22–44)
MCH RBC QN AUTO: 33.8 PG (ref 27.5–34.5)
MCHC RBC AUTO-ENTMCNC: 33.5 G/DL (ref 33.2–36.2)
MCV RBC AUTO: 100.9 FL (ref 81–97)
MD: NO
MONOCYTES # BLD AUTO: 0.77 X10^3/UL (ref 0.2–0.8)
MONOCYTES NFR BLD AUTO: 6 % (ref 2–9)
NEUTROPHILS # BLD AUTO: 11.23 X10^3/UL (ref 1.8–6.8)
NEUTROPHILS NFR BLD AUTO: 88 % (ref 42–75)
PLATELET # BLD AUTO: 485 X10^3/UL (ref 130–400)
PMV BLD AUTO: 6.9 FL (ref 7.4–10.4)
PROT SERPL-MCNC: 6.6 G/DL (ref 6.4–8.2)
PROTHROMBIN TIME: 20.7 SECONDS (ref 9.6–11.5)
RBC # BLD AUTO: 2.83 X10^6/UL (ref 4.38–5.82)
T4 FREE SERPL-MCNC: 1.27 NG/DL (ref 0.76–1.46)
TROPONIN I SERPL-MCNC: 0.07 NG/ML (ref 0–0.04)
TSH SERPL-ACNC: 3.48 MIU/L (ref 0.36–3.74)

## 2019-06-26 PROCEDURE — 80053 COMPREHEN METABOLIC PANEL: CPT

## 2019-06-26 PROCEDURE — 85610 PROTHROMBIN TIME: CPT

## 2019-06-26 PROCEDURE — 87040 BLOOD CULTURE FOR BACTERIA: CPT

## 2019-06-26 PROCEDURE — 84443 ASSAY THYROID STIM HORMONE: CPT

## 2019-06-26 PROCEDURE — 93005 ELECTROCARDIOGRAM TRACING: CPT

## 2019-06-26 PROCEDURE — 71045 X-RAY EXAM CHEST 1 VIEW: CPT

## 2019-06-26 PROCEDURE — 36415 COLL VENOUS BLD VENIPUNCTURE: CPT

## 2019-06-26 PROCEDURE — 84439 ASSAY OF FREE THYROXINE: CPT

## 2019-06-26 PROCEDURE — 85025 COMPLETE CBC W/AUTO DIFF WBC: CPT

## 2019-06-26 PROCEDURE — 84484 ASSAY OF TROPONIN QUANT: CPT

## 2019-06-26 PROCEDURE — 83880 ASSAY OF NATRIURETIC PEPTIDE: CPT

## 2019-06-26 PROCEDURE — 99284 EMERGENCY DEPT VISIT MOD MDM: CPT

## 2019-06-26 NOTE — NUR
PT STATES HAVING SOB WAKING UP THIS AM, STATES HE HAS HAD A COUGH AS WELL WITH 
PAIN. PT DENIES N/V/D, TRAUMA. ERMD IN TO EVAL PT. WIFE AT BEDSIDE.

## 2019-06-26 NOTE — NUR
PT GIVEN DISCHARGE INSTRUCTIONS, UNDERSTANDING STATED. VERIFIED ALL BELONGINGS 
WITH PT ON DISCHARGE, ESCORTED IN WHEELCHAIR TO CHECK OUT. PT IN CARE OF WIFE

## 2019-06-26 NOTE — NUR
ER MD IN TO UPDATE PT. PT AT THIS TIME DOES NOT WISH TO BE ADMITTED, WILL GIVE 
PO LASIX AND RE-EVAL PT PER MD. VERIFIED PT DID NOT NEED LACTIC ACID DRAWN WITH 
ERMD

## 2019-07-02 ENCOUNTER — OFFICE VISIT (OUTPATIENT)
Dept: PULMONOLOGY | Facility: HOSPICE | Age: 74
End: 2019-07-02
Payer: MEDICARE

## 2019-07-02 ENCOUNTER — APPOINTMENT (OUTPATIENT)
Dept: RADIOLOGY | Facility: IMAGING CENTER | Age: 74
End: 2019-07-02
Attending: INTERNAL MEDICINE
Payer: MEDICARE

## 2019-07-02 VITALS
RESPIRATION RATE: 16 BRPM | OXYGEN SATURATION: 95 % | HEART RATE: 72 BPM | SYSTOLIC BLOOD PRESSURE: 102 MMHG | DIASTOLIC BLOOD PRESSURE: 50 MMHG | HEIGHT: 73 IN | BODY MASS INDEX: 24.89 KG/M2 | WEIGHT: 187.8 LBS

## 2019-07-02 DIAGNOSIS — J98.11 ATELECTASIS OF RIGHT LUNG: ICD-10-CM

## 2019-07-02 DIAGNOSIS — J44.9 CHRONIC OBSTRUCTIVE PULMONARY DISEASE, UNSPECIFIED COPD TYPE (HCC): ICD-10-CM

## 2019-07-02 DIAGNOSIS — R06.02 SOB (SHORTNESS OF BREATH): ICD-10-CM

## 2019-07-02 DIAGNOSIS — Z72.0 TOBACCO ABUSE: ICD-10-CM

## 2019-07-02 DIAGNOSIS — I38 VALVULAR DISEASE: ICD-10-CM

## 2019-07-02 DIAGNOSIS — J61 ASBESTOSIS (HCC): ICD-10-CM

## 2019-07-02 DIAGNOSIS — R06.02 SHORTNESS OF BREATH: ICD-10-CM

## 2019-07-02 PROCEDURE — 71046 X-RAY EXAM CHEST 2 VIEWS: CPT | Mod: TC | Performed by: INTERNAL MEDICINE

## 2019-07-02 PROCEDURE — 99214 OFFICE O/P EST MOD 30 MIN: CPT | Performed by: INTERNAL MEDICINE

## 2019-07-02 RX ORDER — LEVALBUTEROL INHALATION SOLUTION 1.25 MG/3ML
1.25 SOLUTION RESPIRATORY (INHALATION) EVERY 4 HOURS PRN
Qty: 75 ML | Refills: 0 | Status: SHIPPED | OUTPATIENT
Start: 2019-07-02 | End: 2019-11-05 | Stop reason: SDUPTHER

## 2019-07-02 RX ORDER — AMLODIPINE BESYLATE AND BENAZEPRIL HYDROCHLORIDE 5; 20 MG/1; MG/1
1 CAPSULE ORAL
Refills: 3 | COMMUNITY
Start: 2019-05-04

## 2019-07-02 RX ORDER — AMIODARONE HYDROCHLORIDE 200 MG/1
TABLET ORAL
Refills: 1 | COMMUNITY
Start: 2019-06-26

## 2019-07-02 RX ORDER — ASPIRIN 81 MG/1
81 TABLET ORAL
Refills: 0 | COMMUNITY
Start: 2019-06-26

## 2019-07-02 NOTE — PROGRESS NOTES
Chief Complaint   Patient presents with   • Follow-Up     COPD     HPI: This patient is a 73 y.o. Male who returns for COPD.  He is accompanied by his wife. He is a lifelong smoker and not interested in quitting. He has chronic rounded atelectasis on chest CAT scan with a right lower lobe 5.7 x 5.7 cm opacity noted since 2010.  Whole body PET scan in December 2012 was normal.  He had an updated chest CAT scan with contrast February 4, 2019 which showed stable right lower lobe atelectasis however development of bilateral calcified pleural plaques, with irregular left pleura, consistent with asbestosis.  Subsequent PET scan February 6, 2019 showed no abnormal uptake within the lungs.  In recent months he progressively worsening exertional dyspnea and recently underwent aortic and mitral valve replacement on June 14, 2019 at [facility].  He had somebleeding and paroxysmal atrial fibrillation postop.  Following hospital discharge he was readmitted with acute congestive heart failure exacerbation.  He was discharged on June 28.  He states he increasing shortness of breath at home and wants to know why he cannot breathe.  Denies cough, acute chest pain or hemoptysis.  Chest x-ray today shows no acute findings.    Past Medical History:   Diagnosis Date   • Arthritis    • Breath shortness     post surgery   • Cancer (HCC)     skin   • COPD (chronic obstructive pulmonary disease) (HCC)    • Dental disorder    • GERD (gastroesophageal reflux disease)    • [place] measles    • Gout    • Heart burn    • Heart murmur    • Hypertension    • Mumps    • Personal history of venous thrombosis and embolism     right leg   • Psychiatric problem     PTSD   • Snoring    • Unspecified cataract     bbilateral IOLI       Social History     Social History   • Marital status:      Spouse name: N/A   • Number of children: N/A   • Years of education: N/A     Occupational History   • Not on file.     Social History Main Topics   •  Smoking status: Current Every Day Smoker     Packs/day: 1.00     Years: 60.00     Types: Cigarettes   • Smokeless tobacco: Never Used      Comment: pt does not wish to quit smoking at this time   • Alcohol use 2.4 - 3.0 oz/week     4 - 5 Shots of liquor per week      Comment: occacionaly   • Drug use: No   • Sexual activity: Not on file     Other Topics Concern   • Not on file     Social History Narrative   • No narrative on file       Family History   Problem Relation Age of Onset   • Alcohol/Drug Mother    • Psychiatry Father    • Cancer Sister        Current Outpatient Prescriptions on File Prior to Visit   Medication Sig Dispense Refill   • Fluticasone-Umeclidin-Vilant (TRELEGY ELLIPTA) 100-62.5-25 MCG/INH AEROSOL POWDER, BREATH ACTIVATED Inhale 1 Puff by mouth every day. 1 Each 6   • tamsulosin (FLOMAX) 0.4 MG capsule Take  by mouth every day.  11   • Oxycodone-Acetaminophen (PERCOCET-10)  MG Tab Take 1-2 Tabs by mouth every four hours as needed for Severe Pain.     • warfarin (COUMADIN) 10 MG TABS Take 10 mg by mouth every day. Dosage varies with labwork     • allopurinol (ZYLOPRIM) 300 MG TABS Take 300 mg by mouth every day.     • Levocetirizine Dihydrochloride (XYZAL) 5 MG Tab Take  by mouth.     • Ferrous Sulfate (IRON) 325 (65 Fe) MG Tab Take 1 Tab by mouth 2 Times a Day.     • amlodipine-benazepril (LOTREL) 5-40 MG per capsule Take 1 Cap by mouth every day.       No current facility-administered medications on file prior to visit.        Allergies: Dilaudid [hydromorphone] and Indocin [indometacin sodium]    ROS:   Constitutional: Denies fevers, chills, night sweats, fatigue or weight loss  Eyes: Denies vision loss, pain, drainage, double vision  Ears, Nose, Throat: Denies earache, difficulty hearing, tinnitus, nasal congestion, hoarseness  Cardiovascular: Denies chest pain, tightness, palpitations, orthopnea,+edema  Respiratory: As in HPI  Sleep: Denies daytime sleepiness, snoring, apneas,  "insomnia, morning headaches  GI: Denies heartburn, dysphagia, nausea, abdominal pain, diarrhea or constipation  : Denies frequent urination, hematuria, discharge or painful urination  Musculoskeletal: Denies back pain, painful joints, sore muscles  Neurological: Denies weakness or headaches  Skin: No rashes    /50 (BP Location: Left arm, Patient Position: Sitting, BP Cuff Size: Adult)   Pulse 72   Resp 16   Ht 1.854 m (6' 1\")   Wt 85.2 kg (187 lb 12.8 oz)   SpO2 95%     Physical Exam:  Appearance: Well-nourished, well-developed, in no acute distress  HEENT: Normocephalic, atraumatic, white sclera, PERRLA, oropharynx clear  Neck: No adenopathy or masses  Respiratory: no intercostal retractions or accessory muscle use  Lungs auscultation: Clear to auscultation bilaterally, diminished breath sounds  Cardiovascular: Regular rate rhythm. No murmurs, rubs or gallops.  No LE edema  Abdomen: soft, nondistended  Gait: Normal  Digits: No clubbing, cyanosis  Motor: R hand numbness  Orientation: Oriented to time, person and place    Diagnosis:  1. SOB (shortness of breath)  DX-CHEST-2 VIEWS   2. Chronic obstructive pulmonary disease, unspecified COPD type (HCC)     3. Tobacco abuse     4. Valvular disease     5. Asbestosis (HCC)     6. Atelectasis of right lung         Plan:  Medical records from [place] reviewed.  The patient had aortic and mitral valve replacement over the past month, with postoperative arrhythmias and congestive heart failure.  He has felt more short of breath since discharge home.  He has underlying COPD and asbestosis as well as ongoing tobacco abuse.  Smoking cessation strongly advised-he is not prepared to quit.  Recommend start nebulizer with Xopenex 4 hours as needed.  We will proceed with chest CAT scan with contrast to exclude other pulmonary comorbidities such as embolism.  He was strongly encouraged to follow-up with Dr. Meneses, his cardiologist to address his cardiovascular " comorbidities.  Return in about 3 months (around 10/2/2019).

## 2019-07-02 NOTE — PATIENT INSTRUCTIONS
Tobacco Use Disorder  Tobacco use disorder (TUD) is a mental disorder. It is the long-term use of tobacco in spite of related health problems or difficulty with normal life activities. Tobacco is most commonly smoked as cigarettes and less commonly as cigars or pipes. Smokeless chewing tobacco and snuff are also popular. People with TUD get a feeling of extreme pleasure (euphoria) from using tobacco and have a desire to use it again and again. Repeated use of tobacco can cause problems.  The addictive effects of tobacco are due mainly to the ingredient nicotine. Nicotine also causes a rush of adrenaline (epinephrine) in the body. This leads to increased blood pressure, heart rate, and breathing rate. These changes may cause problems for people with high blood pressure, weak hearts, or lung disease. High doses of nicotine in children and pets can lead to seizures and death.  Tobacco contains a number of other unsafe chemicals. These chemicals are especially harmful when inhaled as smoke and can damage almost every organ in the body. Smokers live shorter lives than nonsmokers and are at risk of dying from a number of diseases and cancers. Tobacco smoke can also cause health problems for nonsmokers (due to inhaling secondhand smoke). Smoking is also a fire hazard.  TUD usually starts in the late teenage years and is most common in young adults between the ages of 18 and 25 years. People who start smoking earlier in life are more likely to continue smoking as adults. TUD is somewhat more common in men than women. People with TUD are at higher risk for using alcohol and other drugs of abuse.  What increases the risk?  Risk factors for TUD include:  · Having family members with the disorder.  · Being around people who use tobacco.  · Having an existing mental health issue such as schizophrenia, depression, bipolar disorder, ADHD, or posttraumatic stress disorder (PTSD).  What are the signs or symptoms?  People with  tobacco use disorder have two or more of the following signs and symptoms within 12 months:  · Use of more tobacco over a longer period than intended.  · Not able to cut down or control tobacco use.  · A lot of time spent obtaining or using tobacco.  · Strong desire or urge to use tobacco (craving). Cravings may last for 6 months or longer after quitting.  · Use of tobacco even when use leads to major problems at work, school, or home.  · Use of tobacco even when use leads to relationship problems.  · Giving up or cutting down on important life activities because of tobacco use.  · Repeatedly using tobacco in situations where it puts you or others in physical danger, like smoking in bed.  · Use of tobacco even when it is known that a physical or mental problem is likely related to tobacco use.  ¨ Physical problems are numerous and may include chronic bronchitis, emphysema, lung and other cancers, gum disease, high blood pressure, heart disease, and stroke.  ¨ Mental problems caused by tobacco may include difficulty sleeping and anxiety.  · Need to use greater amounts of tobacco to get the same effect. This means you have developed a tolerance.  · Withdrawal symptoms as a result of stopping or rapidly cutting back use. These symptoms may last a month or more after quitting and include the following:  ¨ Depressed, anxious, or irritable mood.  ¨ Difficulty concentrating.  ¨ Increased appetite.  ¨ Restlessness or trouble sleeping.  ¨ Use of tobacco to avoid withdrawal symptoms.  How is this diagnosed?  Tobacco use disorder is diagnosed by your health care provider. A diagnosis may be made by:  · Your health care provider asking questions about your tobacco use and any problems it may be causing.  · A physical exam.  · Lab tests.  · You may be referred to a mental health professional or addiction specialist.  The severity of tobacco use disorder depends on the number of signs and symptoms you have:  · Mild--Two or three  symptoms.  · Moderate--Four or five symptoms.  · Severe--Six or more symptoms.  How is this treated?  Many people with tobacco use disorder are unable to quit on their own and need help. Treatment options include the following:  · Nicotine replacement therapy (NRT). NRT provides nicotine without the other harmful chemicals in tobacco. NRT gradually lowers the dosage of nicotine in the body and reduces withdrawal symptoms. NRT is available in over-the-counter forms (gum, lozenges, and skin patches) as well as prescription forms (mouth inhaler and nasal spray).  · Medicines. This may include:  ¨ Antidepressant medicine that may reduce nicotine cravings.  ¨ A medicine that acts on nicotine receptors in the brain to reduce cravings and withdrawal symptoms. It may also block the effects of tobacco in people with TUD who relapse.  · Counseling or talk therapy. A form of talk therapy called behavioral therapy is commonly used to treat people with TUD. Behavioral therapy looks at triggers for tobacco use, how to avoid them, and how to cope with cravings. It is most effective in person or by phone but is also available in self-help forms (books and Internet websites).  · Support groups. These provide emotional support, advice, and guidance for quitting tobacco.  The most effective treatment for TUD is usually a combination of medicine, talk therapy, and support groups.  Follow these instructions at home:  · Keep all follow-up visits as directed by your health care provider. This is important.  · Take medicines only as directed by your health care provider.  · Check with your health care provider before starting new prescription or over-the-counter medicines.  Contact a health care provider if:  · You are not able to take your medicines as prescribed.  · Treatment is not helping your TUD and your symptoms get worse.  Get help right away if:  · You have serious thoughts about hurting yourself or others.  · You have trouble  breathing, chest pain, sudden weakness, or sudden numbness in part of your body.  This information is not intended to replace advice given to you by your health care provider. Make sure you discuss any questions you have with your health care provider.  Document Released: 08/23/2005 Document Revised: 08/20/2017 Document Reviewed: 02/13/2015  [employer] Interactive Patient Education © 2017 [employer]

## 2019-07-03 ENCOUNTER — TELEPHONE (OUTPATIENT)
Dept: PULMONOLOGY | Facility: HOSPICE | Age: 74
End: 2019-07-03

## 2019-07-03 NOTE — TELEPHONE ENCOUNTER
1. Caller Name: Leonor                     Call Back Number: [phone] (home)       2. Message: Leonor, pt's spouse called and said pt's Nebulizer was sent to [facility].  She would like the order sent to [facility].  Fax#[phone].  Told her I would fax the order to them.    3. Patient approves office to leave a detailed voicemail/MyChart message: N\A      DME order fax to [facility] per Leonor's request.

## 2019-07-05 ENCOUNTER — ANTICOAGULATION MONITORING (OUTPATIENT)
Dept: VASCULAR LAB | Facility: MEDICAL CENTER | Age: 74
End: 2019-07-05

## 2019-07-05 ENCOUNTER — TELEPHONE (OUTPATIENT)
Dept: PULMONOLOGY | Facility: HOSPICE | Age: 74
End: 2019-07-05

## 2019-07-05 ENCOUNTER — HOSPITAL ENCOUNTER (OUTPATIENT)
Dept: LAB | Facility: MEDICAL CENTER | Age: 74
End: 2019-07-05
Attending: INTERNAL MEDICINE
Payer: MEDICARE

## 2019-07-05 DIAGNOSIS — Z79.01 CHRONIC ANTICOAGULATION: ICD-10-CM

## 2019-07-05 LAB
ANION GAP SERPL CALC-SCNC: 10 MMOL/L (ref 0–11.9)
BASOPHILS # BLD AUTO: 0.7 % (ref 0–1.8)
BASOPHILS # BLD: 0.05 K/UL (ref 0–0.12)
BNP SERPL-MCNC: 323 PG/ML (ref 0–100)
BUN SERPL-MCNC: 17 MG/DL (ref 8–22)
CALCIUM SERPL-MCNC: 9 MG/DL (ref 8.5–10.5)
CHLORIDE SERPL-SCNC: 98 MMOL/L (ref 96–112)
CO2 SERPL-SCNC: 23 MMOL/L (ref 20–33)
CREAT SERPL-MCNC: 1.33 MG/DL (ref 0.5–1.4)
EOSINOPHIL # BLD AUTO: 0.07 K/UL (ref 0–0.51)
EOSINOPHIL NFR BLD: 0.9 % (ref 0–6.9)
ERYTHROCYTE [DISTWIDTH] IN BLOOD BY AUTOMATED COUNT: 52.7 FL (ref 35.9–50)
GLUCOSE SERPL-MCNC: 97 MG/DL (ref 65–99)
HCT VFR BLD AUTO: 33.2 % (ref 42–52)
HGB BLD-MCNC: 10.7 G/DL (ref 14–18)
IMM GRANULOCYTES # BLD AUTO: 0.06 K/UL (ref 0–0.11)
IMM GRANULOCYTES NFR BLD AUTO: 0.8 % (ref 0–0.9)
INR PPP: 3.57 (ref 0.87–1.13)
LYMPHOCYTES # BLD AUTO: 0.66 K/UL (ref 1–4.8)
LYMPHOCYTES NFR BLD: 8.9 % (ref 22–41)
MCH RBC QN AUTO: 32.4 PG (ref 27–33)
MCHC RBC AUTO-ENTMCNC: 32.2 G/DL (ref 33.7–35.3)
MCV RBC AUTO: 100.6 FL (ref 81.4–97.8)
MONOCYTES # BLD AUTO: 0.77 K/UL (ref 0–0.85)
MONOCYTES NFR BLD AUTO: 10.4 % (ref 0–13.4)
NEUTROPHILS # BLD AUTO: 5.77 K/UL (ref 1.82–7.42)
NEUTROPHILS NFR BLD: 78.3 % (ref 44–72)
NRBC # BLD AUTO: 0.02 K/UL
NRBC BLD-RTO: 0.3 /100 WBC
PLATELET # BLD AUTO: 302 K/UL (ref 164–446)
PMV BLD AUTO: 9.3 FL (ref 9–12.9)
POTASSIUM SERPL-SCNC: 4.5 MMOL/L (ref 3.6–5.5)
PROTHROMBIN TIME: 37 SEC (ref 12–14.6)
RBC # BLD AUTO: 3.3 M/UL (ref 4.7–6.1)
SODIUM SERPL-SCNC: 131 MMOL/L (ref 135–145)
WBC # BLD AUTO: 7.4 K/UL (ref 4.8–10.8)

## 2019-07-05 PROCEDURE — 83880 ASSAY OF NATRIURETIC PEPTIDE: CPT

## 2019-07-05 PROCEDURE — 80048 BASIC METABOLIC PNL TOTAL CA: CPT

## 2019-07-05 PROCEDURE — 85025 COMPLETE CBC W/AUTO DIFF WBC: CPT

## 2019-07-05 PROCEDURE — 36415 COLL VENOUS BLD VENIPUNCTURE: CPT

## 2019-07-05 PROCEDURE — 85610 PROTHROMBIN TIME: CPT

## 2019-07-05 NOTE — TELEPHONE ENCOUNTER
Terra Alarcon M.D.   You 2 hours ago (1:06 PM)      Need BUN/cr test results. (Routing comment)          Patients wife has been informed. Lab work has been requested from [facility] 07/05/19.

## 2019-07-05 NOTE — TELEPHONE ENCOUNTER
Patients wife, Marleen, called states that patient has completed blood work at [facility] this morning but we have not yet received records from Lab.     Marleen states that patient can not complete Chest CTA that was ordered until  receives blood work.     Records have been requested for Lab Work.     Patient was last seen on 07/02/19    Plan:  Medical records from [place] reviewed.  The patient had aortic and mitral valve replacement over the past month, with postoperative arrhythmias and congestive heart failure.  He has felt more short of breath since discharge home.  He has underlying COPD and asbestosis as well as ongoing tobacco abuse.  Smoking cessation strongly advised-he is not prepared to quit.  Recommend start nebulizer with Xopenex 4 hours as needed.  We will proceed with chest CAT scan with contrast to exclude other pulmonary comorbidities such as embolism.  He was strongly encouraged to follow-up with Dr. Meneses, his cardiologist to address his cardiovascular comorbidities.  Return in about 3 months (around 10/2/2019).    Patient wife was wondering if they should wait for labs to be reviewed or if they should schedule appointment?     Please Advise

## 2019-07-06 NOTE — PROGRESS NOTES
Anticoagulation Summary  As of 7/5/2019    INR goal:   2.0-3.0   TTR:   50.2 % (1.6 y)   INR used for dosing:   3.57! (7/5/2019)   Warfarin maintenance plan:   10 mg (5 mg x 2) every Mon, Fri; 7.5 mg (5 mg x 1.5) all other days   Weekly warfarin total:   57.5 mg   Plan last modified:   Rai Ferrell, PharmD (5/6/2019)   Next INR check:      Target end date:   Indefinite    Indications    Deep vein thrombosis (CMS-HCC) [I82.409] [I82.409]  Pulmonary embolism (CMS-HCC) [I26.99] [I26.99]  Atrial fibrillation (CMS-HCC) [I48.91] [I48.91]  Chronic anticoagulation [Z79.01]             Anticoagulation Episode Summary     INR check location[facility]   [facility]    Preferred lab:       Send INR reminders to:       Comments:   not able to see at [facility]       Anticoagulation Care Providers     Provider Role Specialty Phone number    Graciela Adnersen M.D. Responsible Family Medicine [phone]    [facility] Anticoagulation Services Responsible  [phone]        Anticoagulation Patient Findings          Spoke with Nolan to report a supra therapeutic INR of 3.6.  Will hold AM dose of warfarin (he already took todays dose).  Follow up in 1 weeks, to reduce the risk of adverse events related to this high risk medication, warfarin.    Sis Mae, Clinical Pharmacist

## 2019-07-08 ENCOUNTER — TELEPHONE (OUTPATIENT)
Dept: PULMONOLOGY | Facility: HOSPICE | Age: 74
End: 2019-07-08

## 2019-07-08 ENCOUNTER — HOSPITAL ENCOUNTER (OUTPATIENT)
Dept: HOSPITAL 8 - RAD | Age: 74
Discharge: HOME | End: 2019-07-08
Attending: INTERNAL MEDICINE
Payer: MEDICARE

## 2019-07-08 DIAGNOSIS — I31.3: Primary | ICD-10-CM

## 2019-07-08 DIAGNOSIS — Z95.2: ICD-10-CM

## 2019-07-08 DIAGNOSIS — J98.4: ICD-10-CM

## 2019-07-08 PROCEDURE — 71275 CT ANGIOGRAPHY CHEST: CPT

## 2019-07-08 NOTE — TELEPHONE ENCOUNTER
Patient's wife calling to review labs that were drawn that were needed for CTA - per patient,    Please Advise

## 2019-07-08 NOTE — TELEPHONE ENCOUNTER
Terra Alarcon M.D.   You 16 minutes ago (2:02 PM)      Bun/cr are WNL- can proceed with CT scan (Routing comment)

## 2019-07-11 ENCOUNTER — TELEPHONE (OUTPATIENT)
Dept: PULMONOLOGY | Facility: HOSPICE | Age: 74
End: 2019-07-11

## 2019-07-11 NOTE — TELEPHONE ENCOUNTER
Patient's wife Leonor calling, they would like CT results that were done at [facility] 7/8/19.(report scanned in and images being pushed) Next appointment is 10/17/19 with Dr. Alarcon.

## 2019-07-12 ENCOUNTER — HOSPITAL ENCOUNTER (OUTPATIENT)
Dept: LAB | Facility: MEDICAL CENTER | Age: 74
End: 2019-07-12
Attending: NURSE PRACTITIONER
Payer: MEDICARE

## 2019-07-12 DIAGNOSIS — Z79.01 CHRONIC ANTICOAGULATION: ICD-10-CM

## 2019-07-12 LAB
INR PPP: 4.27 (ref 0.87–1.13)
PROTHROMBIN TIME: 42.8 SEC (ref 12–14.6)

## 2019-07-12 PROCEDURE — 85610 PROTHROMBIN TIME: CPT

## 2019-07-12 PROCEDURE — 36415 COLL VENOUS BLD VENIPUNCTURE: CPT

## 2019-07-12 NOTE — TELEPHONE ENCOUNTER
Patient's wife Leonor calling again for results. I assured her that yes, we did retrieve CT results from [facility] and it was placed in the system for review. Message routed to provider.

## 2019-07-12 NOTE — TELEPHONE ENCOUNTER
Terra Alarcon M.D.   to Christina Charles, Med Ass't           9:31 AM   Please call patient- CT scan was stable and showed no acute process (no fluid/PNA/blood clots).

## 2019-07-15 ENCOUNTER — ANTICOAGULATION MONITORING (OUTPATIENT)
Dept: VASCULAR LAB | Facility: MEDICAL CENTER | Age: 74
End: 2019-07-15

## 2019-07-15 DIAGNOSIS — I48.91 ATRIAL FIBRILLATION, UNSPECIFIED TYPE (HCC): ICD-10-CM

## 2019-07-15 DIAGNOSIS — Z79.01 CHRONIC ANTICOAGULATION: ICD-10-CM

## 2019-07-15 NOTE — PROGRESS NOTES
OP Telephone Anticoagulation Service Note    Date: 7/15/2019      Anticoagulation Summary  As of 7/15/2019    INR goal:   2.0-3.0   TTR:   49.6 % (1.6 y)   INR used for dosin.27! (2019)   Warfarin maintenance plan:   5 mg (5 mg x 1) every Mon, Fri; 7.5 mg (5 mg x 1.5) all other days   Weekly warfarin total:   47.5 mg   Plan last modified:   Jesu Ruiz, Pharmacy Intern (7/15/2019)   Next INR check:   2019   Target end date:   Indefinite    Indications    Deep vein thrombosis (CMS-HCC) [I82.409] [I82.409]  Pulmonary embolism (CMS-HCC) [I26.99] [I26.99]  Atrial fibrillation (CMS-HCC) [I48.91] [I48.91]  Chronic anticoagulation [Z79.01]             Anticoagulation Episode Summary     [facility] check location[facility]   [facility]    Preferred lab:       Send INR reminders to:       Comments:   not able to see at [facility]       Anticoagulation Care Providers     Provider Role Specialty Phone number    Graciela Andersen M.D. Responsible Family Medicine [phone]    [facility] Anticoagulation Services Responsible  [phone]        Anticoagulation Patient Findings        Plan: Left voicemail message to report a SUPRA-therapeutic INR of 4.27 (goal 2.0-3.0).  Pt to Hold warfarin x 2 days, then due to recent amiodarone start, reduced weekly regimen by reducing MF dose from 10 mg to 5 mg, equating to a ~17% dose reduction. Requested pt contact the clinic for any s/s of unusual bleeding, bruising, clotting or any changes to diet or medication.  Follow up in 3 day(s).      Jesu Ruiz, Pharmacy Intern

## 2019-07-15 NOTE — PROGRESS NOTES
"CHIEF COMPLAINT: Post-op visit     PROCEDURE:6/14/2019 Mitral and aortic valve replacement    HPI: Mr. Bauman has been doing well post-operatively.  He is no longer requiring pain medication.  He continues to suero chronic pain with his left leg after multiple surgeries.  He has complaints of dyspnea with minimal exertion.  He also complains of some right handed tingling/movement restrictions.    /68 (BP Location: Left arm, Patient Position: Sitting, BP Cuff Size: Adult)   Pulse 80   Temp 36.9 °C (98.4 °F) (Temporal)   Ht 1.854 m (6' 1\")   Wt 79.2 kg (174 lb 9.7 oz)   SpO2 92%     PHYSICAL EXAM:  CARDIAC: S1, S2, no murmurs, gallops, rub  PULMONARY: Air entry bilaterally.  Diminished lung sounds appreciated on the right side, middle and lower right lobe.  SKIN: no edema  INCISIONS: Sternum stable, wounds well healed    PLAN:  Reviewed recent CT scan from 7/8/2019.  Ordered chest xray today due to diminished lung sounds as listed above. Ordered PT for patient's right hand.  Discussed continued post operative precautions and lifting precautions.  Discussed proper SBE precautions.  Patient has follow up scheduled with cardiology, primary care and pulmonologist.      Continue coumadin for 3 months or per your cardiologists recommendations.    Overall, we are very pleased with the patient’s progress and we have instructed the patient to follow-up with us in the future should they have any concerns related to there surgery, otherwise, we will see the patient on a prn basis. The patient will continue to follow-up with you and there primary care physician. The patient has been informed that any further prescription refills should be done through there primary care physician and/or cardiologist.  They acknowledged understanding.  Thank you for allowing us to participate in the care of this very pleasant patient and please let us know if there is any way we may be of further assistance.  "

## 2019-07-16 NOTE — PROGRESS NOTES
[facility]    Pt didn't hold warfarin yesterday, will hold today and begin reduced regimen.  Follow up in 3 days.     Sina Alcantara, PharmD

## 2019-07-19 ENCOUNTER — ANTICOAGULATION VISIT (OUTPATIENT)
Dept: VASCULAR LAB | Facility: MEDICAL CENTER | Age: 74
End: 2019-07-19
Attending: INTERNAL MEDICINE
Payer: MEDICARE

## 2019-07-19 ENCOUNTER — APPOINTMENT (RX ONLY)
Dept: URBAN - METROPOLITAN AREA CLINIC 4 | Facility: CLINIC | Age: 74
Setting detail: DERMATOLOGY
End: 2019-07-19

## 2019-07-19 VITALS — HEART RATE: 74 BPM | DIASTOLIC BLOOD PRESSURE: 47 MMHG | SYSTOLIC BLOOD PRESSURE: 90 MMHG

## 2019-07-19 DIAGNOSIS — Z79.01 CHRONIC ANTICOAGULATION: ICD-10-CM

## 2019-07-19 DIAGNOSIS — T50.905 ADVERSE EFFECT OF UNSPECIFIED DRUGS, MEDICAMENTS AND BIOLOGICAL SUBSTANCES: ICD-10-CM

## 2019-07-19 DIAGNOSIS — L56.0 DRUG PHOTOTOXIC RESPONSE: ICD-10-CM

## 2019-07-19 PROBLEM — T50.905A ADVERSE EFFECT OF UNSPECIFIED DRUGS, MEDICAMENTS AND BIOLOGICAL SUBSTANCES, INITIAL ENCOUNTER: Status: ACTIVE | Noted: 2019-07-19

## 2019-07-19 LAB
INR BLD: 6.6 (ref 0.9–1.2)
INR PPP: 6.6 (ref 2–3.5)

## 2019-07-19 PROCEDURE — ? COUNSELING

## 2019-07-19 PROCEDURE — 99212 OFFICE O/P EST SF 10 MIN: CPT

## 2019-07-19 PROCEDURE — 85610 PROTHROMBIN TIME: CPT

## 2019-07-19 PROCEDURE — 99213 OFFICE O/P EST LOW 20 MIN: CPT

## 2019-07-19 PROCEDURE — ? PRESCRIPTION

## 2019-07-19 RX ORDER — MUPIROCIN 20 MG/G
OINTMENT TOPICAL
Qty: 1 | Refills: 3 | Status: ERX | COMMUNITY
Start: 2019-07-19

## 2019-07-19 RX ADMIN — MUPIROCIN: 20 OINTMENT TOPICAL at 00:00

## 2019-07-19 ASSESSMENT — LOCATION SIMPLE DESCRIPTION DERM
LOCATION SIMPLE: LEFT PRETIBIAL REGION
LOCATION SIMPLE: RIGHT THIGH
LOCATION SIMPLE: RIGHT THUMB
LOCATION SIMPLE: RIGHT INDEX FINGER
LOCATION SIMPLE: LEFT THUMB
LOCATION SIMPLE: LEFT THIGH
LOCATION SIMPLE: RIGHT PRETIBIAL REGION
LOCATION SIMPLE: LEFT HAND
LOCATION SIMPLE: LEFT INDEX FINGER

## 2019-07-19 ASSESSMENT — LOCATION DETAILED DESCRIPTION DERM
LOCATION DETAILED: RIGHT PROXIMAL DORSAL THUMB
LOCATION DETAILED: LEFT RADIAL DORSAL HAND
LOCATION DETAILED: 1ST WEB SPACE RIGHT HAND
LOCATION DETAILED: LEFT PROXIMAL DORSAL INDEX FINGER
LOCATION DETAILED: RIGHT PROXIMAL PRETIBIAL REGION
LOCATION DETAILED: RIGHT ANTERIOR DISTAL THIGH
LOCATION DETAILED: LEFT PROXIMAL PRETIBIAL REGION
LOCATION DETAILED: RIGHT PROXIMAL DORSAL INDEX FINGER
LOCATION DETAILED: LEFT ANTERIOR DISTAL THIGH
LOCATION DETAILED: LEFT PROXIMAL ULNAR THUMB

## 2019-07-19 ASSESSMENT — LOCATION ZONE DERM
LOCATION ZONE: HAND
LOCATION ZONE: LEG
LOCATION ZONE: FINGER

## 2019-07-19 NOTE — HPI: SKIN LESIONS
Is This A New Presentation, Or A Follow-Up?: Skin Lesions
How Severe Is Your Skin Lesion?: moderate
Additional History: Pt went to see his doctor and took him off of Doxycycline thinking the lesions on his thumbs was a reaction from the sun.

## 2019-07-19 NOTE — PROGRESS NOTES
Anticoagulation Summary  As of 2019    INR goal:   2.0-3.0   TTR:   49.0 % (1.6 y)   INR used for dosin.60! (2019)   Warfarin maintenance plan:   5 mg (5 mg x 1) every day   Weekly warfarin total:   35 mg   Plan last modified:   Sina Alcantara, PharmD (2019)   Next INR check:   2019   Target end date:   Indefinite    Indications    Deep vein thrombosis (CMS-HCC) [I82.409] [I82.409]  Pulmonary embolism (CMS-HCC) [I26.99] [I26.99]  Atrial fibrillation (CMS-HCC) [I48.91] [I48.91]  Chronic anticoagulation [Z79.01]             Anticoagulation Episode Summary     INR check location[facility]   [facility]    Preferred lab:       Send INR reminders to:       Comments:   not able to see at [facility]       Anticoagulation Care Providers     Provider Role Specialty Phone number    Graciela Andersen M.D. Responsible Family Medicine [phone]    [facility] Anticoagulation Services Responsible  [phone]        Anticoagulation Patient Findings      HPI:  Tj Bauman seen in clinic today, on anticoagulation therapy with warfarin for DVT and Afib.   Changes to current medical/health status since last appt: none  Denies signs/symptoms of bleeding and/or thrombosis since the last appt.    Denies any interval changes to diet  Pt started amiodarone about 2 weeks ago.   Denies any complications or cost restrictions with current therapy.   BP recorded in vitals.  BP is low but pt denies any s/s of hypotension.   Confirmed dosing regimen.     A/P   INR  SURPA-therapeutic secondary to amiodarone initiation.   Hold warfarin x3 days then begin reduced regimen.      Follow up appointment in 5 days per pt's mobility.     Sina Alcantara, PharmD

## 2019-07-19 NOTE — PROCEDURE: COUNSELING
Detail Level: Detailed
Patient Specific Counseling (Will Not Stick From Patient To Patient): Keep covered and use sunblock 30 and above. Try to stay out of the sun.

## 2019-07-22 ENCOUNTER — HOSPITAL ENCOUNTER (OUTPATIENT)
Dept: RADIOLOGY | Facility: MEDICAL CENTER | Age: 74
End: 2019-07-22
Attending: PHYSICIAN ASSISTANT
Payer: MEDICARE

## 2019-07-22 ENCOUNTER — OFFICE VISIT (OUTPATIENT)
Dept: SURGERY | Facility: MEDICAL CENTER | Age: 74
End: 2019-07-22
Payer: MEDICARE

## 2019-07-22 VITALS
OXYGEN SATURATION: 92 % | WEIGHT: 174.6 LBS | HEART RATE: 80 BPM | BODY MASS INDEX: 23.14 KG/M2 | DIASTOLIC BLOOD PRESSURE: 68 MMHG | SYSTOLIC BLOOD PRESSURE: 124 MMHG | TEMPERATURE: 98.4 F | HEIGHT: 73 IN

## 2019-07-22 DIAGNOSIS — R06.02 SHORTNESS OF BREATH: ICD-10-CM

## 2019-07-22 DIAGNOSIS — Z98.890 POST-OPERATIVE STATE: ICD-10-CM

## 2019-07-22 DIAGNOSIS — R29.898 RIGHT HAND WEAKNESS: ICD-10-CM

## 2019-07-22 PROCEDURE — 71046 X-RAY EXAM CHEST 2 VIEWS: CPT

## 2019-07-24 ENCOUNTER — ANTICOAGULATION VISIT (OUTPATIENT)
Dept: VASCULAR LAB | Facility: MEDICAL CENTER | Age: 74
End: 2019-07-24
Attending: INTERNAL MEDICINE
Payer: MEDICARE

## 2019-07-24 DIAGNOSIS — Z79.01 CHRONIC ANTICOAGULATION: ICD-10-CM

## 2019-07-24 LAB
INR BLD: 3.9 (ref 0.9–1.2)
INR PPP: 3.9 (ref 2–3.5)

## 2019-07-24 PROCEDURE — 85610 PROTHROMBIN TIME: CPT

## 2019-07-24 PROCEDURE — 99212 OFFICE O/P EST SF 10 MIN: CPT | Performed by: PHARMACIST

## 2019-07-24 NOTE — PROGRESS NOTES
Anticoagulation Summary  As of 7/24/2019    INR goal:   2.0-3.0   TTR:   48.6 % (1.6 y)   INR used for dosing:   3.90! (7/24/2019)   Warfarin maintenance plan:   2.5 mg (5 mg x 0.5) every Mon, Fri; 5 mg (5 mg x 1) all other days   Weekly warfarin total:   30 mg   Plan last modified:   Alexander Gonzalez, PharmD (7/24/2019)   Next INR check:   7/31/2019   Target end date:   Indefinite    Indications    Deep vein thrombosis (CMS-HCC) [I82.409] [I82.409]  Pulmonary embolism (CMS-HCC) [I26.99] [I26.99]  Atrial fibrillation (CMS-HCC) [I48.91] [I48.91]  Chronic anticoagulation [Z79.01]             Anticoagulation Episode Summary     INR check location:   [facility]    Preferred lab:       Send INR reminders to:       Comments:   not able to see at [facility]       Anticoagulation Care Providers     Provider Role Specialty Phone number    Graciela Andersen M.D. Responsible Family Medicine [phone]    [facility] Responsible  [phone]         Anticoagulation Patient Findings    HPI:  Tj Bailey Bauman seen in clinic today, on anticoagulation therapy with warfarin for DVT and A fib.  Changes to current medical/health status since last appt: none  Denies signs/symptoms of bleeding and/or thrombosis since the last appt.    Denies any interval changes to diet  Denies any interval changes to medications since last appt.   Denies any complications or cost restrictions with current therapy.     A/P   INR  supra-therapeutic.   Pt was started on amiodarone one month ago.  Pt already took dose today.  Hold tomorrow's dose.  Take a half tablet on Fri and Mon  Follow up next week.    Follow up appointment in 1 week(s).    Christen Gonzalez, PharmD

## 2019-07-31 ENCOUNTER — ANTICOAGULATION VISIT (OUTPATIENT)
Dept: VASCULAR LAB | Facility: MEDICAL CENTER | Age: 74
End: 2019-07-31
Attending: INTERNAL MEDICINE
Payer: MEDICARE

## 2019-07-31 VITALS — HEART RATE: 104 BPM | DIASTOLIC BLOOD PRESSURE: 88 MMHG | SYSTOLIC BLOOD PRESSURE: 135 MMHG

## 2019-07-31 DIAGNOSIS — I48.91 ATRIAL FIBRILLATION, UNSPECIFIED TYPE (HCC): ICD-10-CM

## 2019-07-31 DIAGNOSIS — I82.90 DEEP VEIN THROMBOSIS (DVT) OF NON-EXTREMITY VEIN, UNSPECIFIED CHRONICITY: ICD-10-CM

## 2019-07-31 DIAGNOSIS — Z79.01 CHRONIC ANTICOAGULATION: ICD-10-CM

## 2019-07-31 DIAGNOSIS — I26.99 OTHER PULMONARY EMBOLISM WITHOUT ACUTE COR PULMONALE, UNSPECIFIED CHRONICITY (HCC): ICD-10-CM

## 2019-07-31 LAB — INR PPP: 4.4 (ref 2–3.5)

## 2019-07-31 PROCEDURE — 85610 PROTHROMBIN TIME: CPT

## 2019-07-31 PROCEDURE — 99212 OFFICE O/P EST SF 10 MIN: CPT | Performed by: NURSE PRACTITIONER

## 2019-07-31 NOTE — PROGRESS NOTES
Anticoagulation Summary  As of 2019    INR goal:   2.0-3.0   TTR:   48.0 % (1.6 y)   INR used for dosin.40! (2019)   Warfarin maintenance plan:   5 mg (5 mg x 1) every Sun, Tue, u; 2.5 mg (5 mg x 0.5) all other days   Weekly warfarin total:   25 mg   Plan last modified:   Krysta Heard, A.P.N. (2019)   Next INR check:   2019   Target end date:   Indefinite    Indications    Deep vein thrombosis (CMS-HCC) [I82.409] [I82.409]  Pulmonary embolism (CMS-HCC) [I26.99] [I26.99]  Atrial fibrillation (CMS-HCC) [I48.91] [I48.91]  Chronic anticoagulation [Z79.01]             Anticoagulation Episode Summary     INR check location:   [facility]    Preferred lab:       Send INR reminders to:       Comments:   not able to see at [facility]       Anticoagulation Care Providers     Provider Role Specialty Phone number    Graciela Andersen M.D. Responsible Family Medicine [phone]    [facility] Responsible  [phone]        Anticoagulation Patient Findings      HPI:  Tj Bauman seen in clinic today for follow up on anticoagulation therapy in the presence of AF, VTE hx.   Denies any changes to current medical/health status since last appointment.   Denies any medication or diet changes.   No current symptoms of bleeding or thrombosis reported.    A/P:   INR supratherapeutic despite a dose hold and weekly decrease.   Will HOLD tonight and decrease regimen further. He knows to avoid falls and monitor for prolonged bleeding.   BP recorded in vitals.    Follow up appointment in 1 week(s).    Next Appointment: 2019 at 11:45 am.    Krysta AIKEN

## 2019-08-01 LAB — INR BLD: 4.4 (ref 0.9–1.2)

## 2019-08-09 ENCOUNTER — ANTICOAGULATION VISIT (OUTPATIENT)
Dept: VASCULAR LAB | Facility: MEDICAL CENTER | Age: 74
End: 2019-08-09
Attending: INTERNAL MEDICINE
Payer: MEDICARE

## 2019-08-09 VITALS — DIASTOLIC BLOOD PRESSURE: 72 MMHG | SYSTOLIC BLOOD PRESSURE: 106 MMHG

## 2019-08-09 DIAGNOSIS — Z79.01 CHRONIC ANTICOAGULATION: ICD-10-CM

## 2019-08-09 LAB
INR BLD: 1.8 (ref 0.9–1.2)
INR PPP: 1.8 (ref 2–3.5)

## 2019-08-09 PROCEDURE — 99212 OFFICE O/P EST SF 10 MIN: CPT

## 2019-08-09 PROCEDURE — 85610 PROTHROMBIN TIME: CPT

## 2019-08-09 NOTE — PROGRESS NOTES
Anticoagulation Summary  As of 2019    INR goal:   2.0-3.0   TTR:   47.9 % (1.6 y)   INR used for dosin.80! (2019)   Warfarin maintenance plan:   2.5 mg (5 mg x 0.5) every Mon, Wed, Fri; 5 mg (5 mg x 1) all other days   Weekly warfarin total:   27.5 mg   Plan last modified:   Gavin Warren PharmD (2019)   Next INR check:   2019   Target end date:   Indefinite    Indications    Deep vein thrombosis (CMS-HCC) [I82.409] [I82.409]  Pulmonary embolism (CMS-HCC) [I26.99] [I26.99]  Atrial fibrillation (CMS-HCC) [I48.91] [I48.91]  Chronic anticoagulation [Z79.01]             Anticoagulation Episode Summary     INR check location:   Anticoagulation Clinic    Preferred lab:       Send INR reminders to:       Comments:   not able to see at [facility]       Anticoagulation Care Providers     Provider Role Specialty Phone number    Graciela Andersen M.D. Responsible Family Medicine [phone]    [facility] Responsible  [phone]        Anticoagulation Patient Findings      HPI:  Tj Bauman seen in clinic today for follow up on anticoagulation therapy in the presence of DVT, PE and Afib.   Denies any changes to current medical/health status since last appointment.   Denies any medication or diet changes.   No current symptoms of bleeding or thrombosis reported.    A/P:   INR is sub-therapeutic at 1.8 from a 4.4 last week    Patients weekly dose slightly increased    Patient states that he has stopped his amiodarone without instructions from cardiology or PCP, due to importance of medication I please encouraged him to resume this but he declined. He was instructed to call his cardiologist and PCP      Follow up appointment in 1 week(s).    Next Appointment: 2019    Connor Miles.D

## 2019-08-12 ENCOUNTER — APPOINTMENT (RX ONLY)
Dept: URBAN - METROPOLITAN AREA CLINIC 4 | Facility: CLINIC | Age: 74
Setting detail: DERMATOLOGY
End: 2019-08-12

## 2019-08-12 DIAGNOSIS — L82.1 OTHER SEBORRHEIC KERATOSIS: ICD-10-CM

## 2019-08-12 DIAGNOSIS — L81.4 OTHER MELANIN HYPERPIGMENTATION: ICD-10-CM

## 2019-08-12 DIAGNOSIS — D22 MELANOCYTIC NEVI: ICD-10-CM

## 2019-08-12 DIAGNOSIS — L57.8 OTHER SKIN CHANGES DUE TO CHRONIC EXPOSURE TO NONIONIZING RADIATION: ICD-10-CM

## 2019-08-12 DIAGNOSIS — L57.0 ACTINIC KERATOSIS: ICD-10-CM

## 2019-08-12 DIAGNOSIS — Z85.828 PERSONAL HISTORY OF OTHER MALIGNANT NEOPLASM OF SKIN: ICD-10-CM

## 2019-08-12 DIAGNOSIS — D18.0 HEMANGIOMA: ICD-10-CM

## 2019-08-12 PROBLEM — D18.01 HEMANGIOMA OF SKIN AND SUBCUTANEOUS TISSUE: Status: ACTIVE | Noted: 2019-08-12

## 2019-08-12 PROBLEM — D22.5 MELANOCYTIC NEVI OF TRUNK: Status: ACTIVE | Noted: 2019-08-12

## 2019-08-12 PROCEDURE — 17003 DESTRUCT PREMALG LES 2-14: CPT

## 2019-08-12 PROCEDURE — 99213 OFFICE O/P EST LOW 20 MIN: CPT | Mod: 25

## 2019-08-12 PROCEDURE — 17000 DESTRUCT PREMALG LESION: CPT

## 2019-08-12 PROCEDURE — ? COUNSELING

## 2019-08-12 PROCEDURE — ? LIQUID NITROGEN

## 2019-08-12 ASSESSMENT — LOCATION ZONE DERM
LOCATION ZONE: ARM
LOCATION ZONE: NECK
LOCATION ZONE: FACE
LOCATION ZONE: HAND
LOCATION ZONE: TRUNK

## 2019-08-12 ASSESSMENT — LOCATION DETAILED DESCRIPTION DERM
LOCATION DETAILED: LEFT LATERAL MALAR CHEEK
LOCATION DETAILED: RIGHT INFERIOR CENTRAL MALAR CHEEK
LOCATION DETAILED: RIGHT MEDIAL MALAR CHEEK
LOCATION DETAILED: RIGHT INFERIOR ANTERIOR NECK
LOCATION DETAILED: RIGHT DISTAL DORSAL FOREARM
LOCATION DETAILED: LEFT RADIAL DORSAL HAND
LOCATION DETAILED: RIGHT RADIAL DORSAL HAND
LOCATION DETAILED: RIGHT INFERIOR MEDIAL UPPER BACK
LOCATION DETAILED: LEFT PROXIMAL RADIAL DORSAL FOREARM
LOCATION DETAILED: RIGHT SUPERIOR UPPER BACK
LOCATION DETAILED: LEFT SUPERIOR UPPER BACK
LOCATION DETAILED: LEFT PROXIMAL DORSAL FOREARM
LOCATION DETAILED: RIGHT ULNAR DORSAL HAND

## 2019-08-12 ASSESSMENT — LOCATION SIMPLE DESCRIPTION DERM
LOCATION SIMPLE: RIGHT FOREARM
LOCATION SIMPLE: LEFT UPPER BACK
LOCATION SIMPLE: RIGHT UPPER BACK
LOCATION SIMPLE: LEFT HAND
LOCATION SIMPLE: LEFT CHEEK
LOCATION SIMPLE: LEFT FOREARM
LOCATION SIMPLE: RIGHT HAND
LOCATION SIMPLE: RIGHT CHEEK
LOCATION SIMPLE: RIGHT ANTERIOR NECK

## 2019-08-12 NOTE — PROCEDURE: LIQUID NITROGEN
Detail Level: Simple
Render Note In Bullet Format When Appropriate: No
Aperture Size (Optional): C
Consent: The patient's consent was obtained including but not limited to risks of crusting, scabbing, blistering, scarring, darker or lighter pigmentary change, recurrence, incomplete removal and infection.
Number Of Freeze-Thaw Cycles: 2 freeze-thaw cycles
Post-Care Instructions: I reviewed with the patient in detail post-care instructions. Patient is to wear sunprotection, and avoid picking at any of the treated lesions. Pt may apply Vaseline to crusted or scabbing areas.
Duration Of Freeze Thaw-Cycle (Seconds): 3

## 2019-08-16 ENCOUNTER — ANTICOAGULATION VISIT (OUTPATIENT)
Dept: VASCULAR LAB | Facility: MEDICAL CENTER | Age: 74
End: 2019-08-16
Attending: INTERNAL MEDICINE
Payer: MEDICARE

## 2019-08-16 DIAGNOSIS — Z79.01 CHRONIC ANTICOAGULATION: ICD-10-CM

## 2019-08-16 LAB
INR BLD: 2.6 (ref 0.9–1.2)
INR PPP: 2.6 (ref 2–3.5)

## 2019-08-16 PROCEDURE — 85610 PROTHROMBIN TIME: CPT

## 2019-08-16 PROCEDURE — 99211 OFF/OP EST MAY X REQ PHY/QHP: CPT | Performed by: PHARMACIST

## 2019-08-16 NOTE — PROGRESS NOTES
Anticoagulation Summary  As of 2019    INR goal:   2.0-3.0   TTR:   48.2 % (1.7 y)   INR used for dosin.60 (2019)   Warfarin maintenance plan:   2.5 mg (5 mg x 0.5) every Mon, Wed, Fri; 5 mg (5 mg x 1) all other days   Weekly warfarin total:   27.5 mg   Plan last modified:   Connor HidalgoD (2019)   Next INR check:   2019   Target end date:   Indefinite    Indications    Deep vein thrombosis (CMS-HCC) [I82.409] [I82.409]  Pulmonary embolism (CMS-HCC) [I26.99] [I26.99]  Atrial fibrillation (CMS-HCC) [I48.91] [I48.91]  Chronic anticoagulation [Z79.01]             Anticoagulation Episode Summary     INR check location:   Anticoagulation Clinic    Preferred lab:       Send INR reminders to:       Comments:   not able to see at [facility]       Anticoagulation Care Providers     Provider Role Specialty Phone number    Graciela Andersen M.D. Responsible Family Medicine [phone]    [facility]ible  [phone]                Anticoagulation Patient Findings      HPI:  Tj Bailey Bauman seen in clinic today, on anticoagulation therapy with warfarin for VTE  Changes to current medical/health status since last appt: denies  Denies signs/symptoms of bleeding and/or thrombosis since the last appt.    Denies any interval changes to diet  Denies any interval changes to medications since last appt.   Denies any complications or cost restrictions with current therapy.   BP declined      A/P   INR  is-therapeutic.   Will continue with the same warfarin dosing.     Follow up appointment in 1 week(s).    Sheila Olivares, PharmD

## 2019-08-23 ENCOUNTER — ANTICOAGULATION VISIT (OUTPATIENT)
Dept: VASCULAR LAB | Facility: MEDICAL CENTER | Age: 74
End: 2019-08-23
Attending: INTERNAL MEDICINE
Payer: MEDICARE

## 2019-08-23 VITALS — SYSTOLIC BLOOD PRESSURE: 113 MMHG | DIASTOLIC BLOOD PRESSURE: 68 MMHG | HEART RATE: 102 BPM

## 2019-08-23 DIAGNOSIS — Z79.01 CHRONIC ANTICOAGULATION: ICD-10-CM

## 2019-08-23 LAB
INR BLD: 2.9 (ref 0.9–1.2)
INR PPP: 2.9 (ref 2–3.5)

## 2019-08-23 PROCEDURE — 85610 PROTHROMBIN TIME: CPT

## 2019-08-23 PROCEDURE — 99211 OFF/OP EST MAY X REQ PHY/QHP: CPT

## 2019-08-23 NOTE — PROGRESS NOTES
Anticoagulation Summary  As of 2019    INR goal:   2.0-3.0   TTR:   48.8 % (1.7 y)   INR used for dosin.90 (2019)   Warfarin maintenance plan:   2.5 mg (5 mg x 0.5) every Mon, Wed, Fri; 5 mg (5 mg x 1) all other days   Weekly warfarin total:   27.5 mg   Plan last modified:   Gavin Warren PharmD (2019)   Next INR check:   2019   Target end date:   Indefinite    Indications    Deep vein thrombosis (CMS-HCC) [I82.409] [I82.409]  Pulmonary embolism (CMS-HCC) [I26.99] [I26.99]  Atrial fibrillation (CMS-HCC) [I48.91] [I48.91]  Chronic anticoagulation [Z79.01]             Anticoagulation Episode Summary     INR check location:   Anticoagulation Clinic    Preferred lab:       Send INR reminders to:       Comments:   not able to see at [facility]       Anticoagulation Care Providers     Provider Role Specialty Phone number    Graciela Andersen M.D. Responsible Family Medicine [phone]    [facility] Responsible  [phone]        Anticoagulation Patient Findings      HPI:  Tj Bauman seen in clinic today for follow up on anticoagulation therapy in the presence of DVT and PE .   Denies any changes to current medical/health status since last appointment.   Denies any medication or diet changes.   No current symptoms of bleeding or thrombosis reported.    A/P:   INR is therapeutic.   Continue current regimen.   BP recorded in vitals.    Follow up appointment in 2 week(s).    Next Appointment: 2019    Connor Miles.D

## 2019-09-06 ENCOUNTER — ANTICOAGULATION VISIT (OUTPATIENT)
Dept: VASCULAR LAB | Facility: MEDICAL CENTER | Age: 74
End: 2019-09-06
Attending: INTERNAL MEDICINE
Payer: MEDICARE

## 2019-09-06 VITALS — SYSTOLIC BLOOD PRESSURE: 138 MMHG | DIASTOLIC BLOOD PRESSURE: 83 MMHG | HEART RATE: 107 BPM

## 2019-09-06 DIAGNOSIS — I82.409 DEEP VEIN THROMBOSIS (DVT) OF LOWER EXTREMITY, UNSPECIFIED CHRONICITY, UNSPECIFIED LATERALITY, UNSPECIFIED VEIN (HCC): ICD-10-CM

## 2019-09-06 DIAGNOSIS — Z79.01 CHRONIC ANTICOAGULATION: ICD-10-CM

## 2019-09-06 DIAGNOSIS — I48.91 ATRIAL FIBRILLATION, UNSPECIFIED TYPE (HCC): ICD-10-CM

## 2019-09-06 DIAGNOSIS — I26.99 OTHER PULMONARY EMBOLISM WITHOUT ACUTE COR PULMONALE, UNSPECIFIED CHRONICITY (HCC): ICD-10-CM

## 2019-09-06 LAB — INR PPP: 1.7 (ref 2–3.5)

## 2019-09-06 PROCEDURE — 99212 OFFICE O/P EST SF 10 MIN: CPT

## 2019-09-06 PROCEDURE — 85610 PROTHROMBIN TIME: CPT

## 2019-09-06 NOTE — PROGRESS NOTES
Anticoagulation Summary  As of 2019    INR goal:   2.0-3.0   TTR:   49.4 % (1.7 y)   INR used for dosin.70! (2019)   Warfarin maintenance plan:   2.5 mg (5 mg x 0.5) every Mon, Wed, Fri; 5 mg (5 mg x 1) all other days   Weekly warfarin total:   27.5 mg   Plan last modified:   Gavin Warren, PharmD (2019)   Next INR check:   2019   Target end date:   Indefinite    Indications    Deep vein thrombosis (CMS-HCC) [I82.409] [I82.409]  Pulmonary embolism (CMS-HCC) [I26.99] [I26.99]  Atrial fibrillation (CMS-HCC) [I48.91] [I48.91]  Chronic anticoagulation [Z79.01]             Anticoagulation Episode Summary     INR check location:   Anticoagulation Clinic    Preferred lab:       Send INR reminders to:       Comments:   not able to see at [facility]       Anticoagulation Care Providers     Provider Role Specialty Phone number    Graciela Andersen M.D. Responsible Family Medicine [phone]    [facility] Responsible  [phone]        Anticoagulation Patient Findings  Patient Findings     Negatives:   Signs/symptoms of thrombosis, Signs/symptoms of bleeding, Laboratory test error suspected, Change in health, Change in alcohol use, Change in activity, Upcoming invasive procedure, Emergency department visit, Upcoming dental procedure, Missed doses, Extra doses, Change in medications, Change in diet/appetite, Hospital admission, Bruising, Other complaints          HPI:  Tj Bauman seen in clinic today, on anticoagulation therapy with warfarin for DVT  Changes to current medical/health status since last appt: none  Denies signs/symptoms of bleeding and/or thrombosis since the last appt.    Denies any interval changes to diet  Denies any interval changes to medications since last appt.   Denies any complications or cost restrictions with current therapy.   BP recorded in vitals.  Confirmed current dosing regimen.     Patient's previous INR was therapeutic at 2.9 on  8/23/19, at which time patient was instructed to continue with current warfarin regimen. He returns to clinic today to recheck INR to ensure it is therapeutic and thus preventing possible clotting and/or bleeding/bruising complications.    A/P   INR is SUB-therapeutic today at 1.7.  Patient will bolus with 5mg TONIGHT, as a one time boost, then will resume his current dosing regimen. Patient will follow up again in 1 week.     Next appt: Friday, Sept 13, 2019  1:30pm    Cally Arora PharmD

## 2019-09-11 LAB — INR BLD: 1.7 (ref 0.9–1.2)

## 2019-09-13 ENCOUNTER — ANTICOAGULATION VISIT (OUTPATIENT)
Dept: VASCULAR LAB | Facility: MEDICAL CENTER | Age: 74
End: 2019-09-13
Attending: INTERNAL MEDICINE
Payer: MEDICARE

## 2019-09-13 VITALS — DIASTOLIC BLOOD PRESSURE: 94 MMHG | HEART RATE: 112 BPM | SYSTOLIC BLOOD PRESSURE: 132 MMHG

## 2019-09-13 DIAGNOSIS — I48.91 ATRIAL FIBRILLATION, UNSPECIFIED TYPE (HCC): ICD-10-CM

## 2019-09-13 DIAGNOSIS — I26.99 OTHER PULMONARY EMBOLISM WITHOUT ACUTE COR PULMONALE, UNSPECIFIED CHRONICITY (HCC): ICD-10-CM

## 2019-09-13 DIAGNOSIS — Z79.01 CHRONIC ANTICOAGULATION: ICD-10-CM

## 2019-09-13 DIAGNOSIS — I82.409 DEEP VEIN THROMBOSIS (DVT) OF LOWER EXTREMITY, UNSPECIFIED CHRONICITY, UNSPECIFIED LATERALITY, UNSPECIFIED VEIN (HCC): ICD-10-CM

## 2019-09-13 LAB
INR BLD: 2.4 (ref 0.9–1.2)
INR PPP: 2.4 (ref 2–3.5)

## 2019-09-13 PROCEDURE — 99211 OFF/OP EST MAY X REQ PHY/QHP: CPT | Performed by: PHARMACIST

## 2019-09-13 NOTE — PROGRESS NOTES
Anticoagulation Summary  As of 2019    INR goal:   2.0-3.0   TTR:   49.5 % (1.7 y)   INR used for dosin.40 (2019)   Warfarin maintenance plan:   2.5 mg (5 mg x 0.5) every Mon, Wed, Fri; 5 mg (5 mg x 1) all other days   Weekly warfarin total:   27.5 mg   Plan last modified:   Connor HidalgoD (2019)   Next INR check:   2019   Target end date:   Indefinite    Indications    Deep vein thrombosis (CMS-HCC) [I82.409] [I82.409]  Pulmonary embolism (CMS-HCC) [I26.99] [I26.99]  Atrial fibrillation (CMS-HCC) [I48.91] [I48.91]  Chronic anticoagulation [Z79.01]             Anticoagulation Episode Summary     INR check location:   Anticoagulation Clinic    Preferred lab:       Send INR reminders to:       Comments:   not able to see at [facility]       Anticoagulation Care Providers     Provider Role Specialty Phone number    Graciela Andersen M.D. Responsible Family Medicine [phone]    [facility] Responsible  [phone]                Anticoagulation Patient Findings      HPI:  Tj Bailey Mitul seen in clinic today, on anticoagulation therapy with warfarin for Hx DVT  Changes to current medical/health status since last appt: denies  Denies signs/symptoms of bleeding and/or thrombosis since the last appt.    Denies any interval changes to diet  Denies any interval changes to medications since last appt.   Denies any complications or cost restrictions with current therapy.   BP recorded in vitals.       A/P   INR  is-therapeutic.   Will continue with the same warfarin dosing.     Follow up appointment in 1 week(s).    Sheila Olivares, PharmD

## 2019-09-20 ENCOUNTER — ANTICOAGULATION VISIT (OUTPATIENT)
Dept: VASCULAR LAB | Facility: MEDICAL CENTER | Age: 74
End: 2019-09-20
Attending: INTERNAL MEDICINE
Payer: MEDICARE

## 2019-09-20 DIAGNOSIS — I26.99 OTHER PULMONARY EMBOLISM WITHOUT ACUTE COR PULMONALE, UNSPECIFIED CHRONICITY (HCC): ICD-10-CM

## 2019-09-20 DIAGNOSIS — I82.409 DEEP VEIN THROMBOSIS (DVT) OF LOWER EXTREMITY, UNSPECIFIED CHRONICITY, UNSPECIFIED LATERALITY, UNSPECIFIED VEIN (HCC): ICD-10-CM

## 2019-09-20 DIAGNOSIS — Z79.01 CHRONIC ANTICOAGULATION: ICD-10-CM

## 2019-09-20 DIAGNOSIS — I48.91 ATRIAL FIBRILLATION, UNSPECIFIED TYPE (HCC): ICD-10-CM

## 2019-09-20 LAB
INR BLD: 2.2 (ref 0.9–1.2)
INR PPP: 2.2 (ref 2–3.5)

## 2019-09-20 PROCEDURE — 99211 OFF/OP EST MAY X REQ PHY/QHP: CPT

## 2019-09-20 PROCEDURE — 85610 PROTHROMBIN TIME: CPT

## 2019-09-20 NOTE — PROGRESS NOTES
Anticoagulation Summary  As of 2019    INR goal:   2.0-3.0   TTR:   50.0 % (1.8 y)   INR used for dosin.20 (2019)   Warfarin maintenance plan:   2.5 mg (5 mg x 0.5) every Mon, Wed, Fri; 5 mg (5 mg x 1) all other days   Weekly warfarin total:   27.5 mg   Plan last modified:   Gavin Warren, PharmD (2019)   Next INR check:   10/3/2019   Target end date:   Indefinite    Indications    Deep vein thrombosis (CMS-HCC) [I82.409] [I82.409]  Pulmonary embolism (CMS-HCC) [I26.99] [I26.99]  Atrial fibrillation (CMS-HCC) [I48.91] [I48.91]  Chronic anticoagulation [Z79.01]             Anticoagulation Episode Summary     INR check location:   Anticoagulation Clinic    Preferred lab:       Send INR reminders to:       Comments:   not able to see at [facility]       Anticoagulation Care Providers     Provider Role Specialty Phone number    Graciela Andersen M.D. Responsible Family Medicine [phone]    [facility] Responsible  [phone]        Anticoagulation Patient Findings  Patient Findings     Negatives:   Signs/symptoms of thrombosis, Signs/symptoms of bleeding, Laboratory test error suspected, Change in health, Change in alcohol use, Change in activity, Upcoming invasive procedure, Emergency department visit, Upcoming dental procedure, Missed doses, Extra doses, Change in medications, Change in diet/appetite, Hospital admission, Bruising, Other complaints          HPI:  Tj Bauman seen in clinic today, on anticoagulation therapy with warfarin for DVT and AF.  Changes to current medical/health status since last appt: none  Denies signs/symptoms of bleeding and/or thrombosis since the last appt.    Denies any interval changes to diet  Denies any interval changes to medications since last appt.   Denies any complications or cost restrictions with current therapy.   BP declined today.  Confirmed current dosing regimen.     Patient's previous INR was therapeutic at 2.4 on  9/13/19, at which time patient was instructed to continue with current warfarin regimen. He returns to clinic today to recheck INR to ensure it is therapeutic and thus preventing possible clotting and/or bleeding/bruising complications.    A/P   INR is therapeutic today at 2.2.  Patient instructed to continue with the current warfarin dosing regimen, and asked to follow up again in 2 weeks.    Next appt: Thursday, Oct 3, 2019  11:15am    Cally RyanD

## 2019-10-03 ENCOUNTER — ANTICOAGULATION VISIT (OUTPATIENT)
Dept: VASCULAR LAB | Facility: MEDICAL CENTER | Age: 74
End: 2019-10-03
Attending: INTERNAL MEDICINE
Payer: MEDICARE

## 2019-10-03 DIAGNOSIS — Z79.01 CHRONIC ANTICOAGULATION: ICD-10-CM

## 2019-10-03 LAB — INR PPP: 2.2 (ref 2–3.5)

## 2019-10-03 PROCEDURE — 99211 OFF/OP EST MAY X REQ PHY/QHP: CPT

## 2019-10-03 PROCEDURE — 85610 PROTHROMBIN TIME: CPT

## 2019-10-03 NOTE — PROGRESS NOTES
Anticoagulation Summary  As of 10/3/2019    INR goal:   2.0-3.0   TTR:   51.0 % (1.8 y)   INR used for dosin.20 (10/3/2019)   Warfarin maintenance plan:   2.5 mg (5 mg x 0.5) every Mon, Wed, Fri; 5 mg (5 mg x 1) all other days   Weekly warfarin total:   27.5 mg   Plan last modified:   Connor HdialgoD (2019)   Next INR check:   10/17/2019   Target end date:   Indefinite    Indications    Deep vein thrombosis (CMS-HCC) [I82.409] [I82.409]  Pulmonary embolism (CMS-HCC) [I26.99] [I26.99]  Atrial fibrillation (CMS-HCC) [I48.91] [I48.91]  Chronic anticoagulation [Z79.01]             Anticoagulation Episode Summary     INR check location:   Anticoagulation Clinic    Preferred lab:       Send INR reminders to:       Comments:   not able to see at [facility]       Anticoagulation Care Providers     Provider Role Specialty Phone number    Graciela Andersen M.D. Responsible Family Medicine [phone]    [facility]  [phone]        Anticoagulation Patient Findings      HPI:  Tj Bauman seen in clinic today, on anticoagulation therapy with warfarin for DVT.   Changes to current medical/health status since last appt: none  Denies signs/symptoms of bleeding and/or thrombosis since the last appt.    Denies any interval changes to diet  Finished medrol dose pack 7 days ago.   Denies any complications or cost restrictions with current therapy.   Declines vitals.  Confirmed dosing regimen.     A/P   INR  therapeutic.   Pt is to continue with current warfarin dosing regimen.     Follow up appointment in 2 week(s).    Sina Alcantara, PharmD

## 2019-10-04 LAB — INR BLD: 2.2 (ref 0.9–1.2)

## 2019-10-17 ENCOUNTER — HOSPITAL ENCOUNTER (OUTPATIENT)
Dept: RADIOLOGY | Facility: MEDICAL CENTER | Age: 74
End: 2019-10-17

## 2019-10-17 ENCOUNTER — OFFICE VISIT (OUTPATIENT)
Dept: PULMONOLOGY | Facility: HOSPICE | Age: 74
End: 2019-10-17
Payer: MEDICARE

## 2019-10-17 ENCOUNTER — ANTICOAGULATION VISIT (OUTPATIENT)
Dept: VASCULAR LAB | Facility: MEDICAL CENTER | Age: 74
End: 2019-10-17
Attending: INTERNAL MEDICINE
Payer: MEDICARE

## 2019-10-17 VITALS
HEIGHT: 73 IN | BODY MASS INDEX: 24.25 KG/M2 | WEIGHT: 183 LBS | HEART RATE: 77 BPM | RESPIRATION RATE: 16 BRPM | SYSTOLIC BLOOD PRESSURE: 128 MMHG | DIASTOLIC BLOOD PRESSURE: 72 MMHG | OXYGEN SATURATION: 91 %

## 2019-10-17 DIAGNOSIS — I26.99 OTHER PULMONARY EMBOLISM WITHOUT ACUTE COR PULMONALE, UNSPECIFIED CHRONICITY (HCC): ICD-10-CM

## 2019-10-17 DIAGNOSIS — I82.90 DEEP VEIN THROMBOSIS (DVT) OF NON-EXTREMITY VEIN, UNSPECIFIED CHRONICITY: ICD-10-CM

## 2019-10-17 DIAGNOSIS — Z79.01 CHRONIC ANTICOAGULATION: ICD-10-CM

## 2019-10-17 DIAGNOSIS — J44.9 CHRONIC OBSTRUCTIVE PULMONARY DISEASE, UNSPECIFIED COPD TYPE (HCC): ICD-10-CM

## 2019-10-17 DIAGNOSIS — Z95.4 STATUS POST HEART VALVE REPLACEMENT: ICD-10-CM

## 2019-10-17 DIAGNOSIS — Z72.0 TOBACCO ABUSE: ICD-10-CM

## 2019-10-17 DIAGNOSIS — J61 ASBESTOSIS (HCC): ICD-10-CM

## 2019-10-17 DIAGNOSIS — I48.91 ATRIAL FIBRILLATION, UNSPECIFIED TYPE (HCC): ICD-10-CM

## 2019-10-17 LAB
INR BLD: 3.2 (ref 0.9–1.2)
INR PPP: 3.2 (ref 2–3.5)

## 2019-10-17 PROCEDURE — 85610 PROTHROMBIN TIME: CPT

## 2019-10-17 PROCEDURE — 99212 OFFICE O/P EST SF 10 MIN: CPT | Performed by: NURSE PRACTITIONER

## 2019-10-17 PROCEDURE — 99214 OFFICE O/P EST MOD 30 MIN: CPT | Performed by: INTERNAL MEDICINE

## 2019-10-24 ENCOUNTER — ANTICOAGULATION VISIT (OUTPATIENT)
Dept: VASCULAR LAB | Facility: MEDICAL CENTER | Age: 74
End: 2019-10-24
Attending: INTERNAL MEDICINE
Payer: MEDICARE

## 2019-10-24 DIAGNOSIS — Z79.01 CHRONIC ANTICOAGULATION: ICD-10-CM

## 2019-10-24 LAB
INR BLD: 2.2 (ref 0.9–1.2)
INR PPP: 2.2 (ref 2–3.5)

## 2019-10-24 PROCEDURE — 99211 OFF/OP EST MAY X REQ PHY/QHP: CPT

## 2019-10-24 PROCEDURE — 85610 PROTHROMBIN TIME: CPT

## 2019-10-24 NOTE — PROGRESS NOTES
Anticoagulation Summary  As of 10/24/2019    INR goal:   2.0-3.0   TTR:   51.9 % (1.9 y)   INR used for dosin.20 (10/24/2019)   Warfarin maintenance plan:   2.5 mg (5 mg x 0.5) every Mon, Wed, Fri; 5 mg (5 mg x 1) all other days   Weekly warfarin total:   27.5 mg   Plan last modified:   Connor HidalgoD (2019)   Next INR check:   2019   Target end date:   Indefinite    Indications    Deep vein thrombosis (CMS-HCC) [I82.409] [I82.409]  Pulmonary embolism (CMS-HCC) [I26.99] [I26.99]  Atrial fibrillation (CMS-HCC) [I48.91] [I48.91]  Chronic anticoagulation [Z79.01]             Anticoagulation Episode Summary     INR check location:   Anticoagulation Clinic    Preferred lab:       Send INR reminders to:       Comments:   not able to see at [facility]       Anticoagulation Care Providers     Provider Role Specialty Phone number    Graciela Andersen M.D. Responsible Family Medicine [phone]    [facility] Responsible  [phone]        Anticoagulation Patient Findings      HPI:  Tj Bauman seen in clinic today, on anticoagulation therapy with warfarin for DVT.   Changes to current medical/health status since last appt: none  Denies signs/symptoms of bleeding and/or thrombosis since the last appt.    Denies any interval changes to diet  Denies any interval changes to medications since last appt.   Denies any complications or cost restrictions with current therapy.   Declines vitals.  Confirmed dosing regimen.     A/P   INR  therapeutic.   Pt is to continue with current warfarin dosing regimen.     Follow up appointment in 2 week(s).    Sina Alcantara, PharmD

## 2019-11-05 DIAGNOSIS — J44.9 CHRONIC OBSTRUCTIVE PULMONARY DISEASE, UNSPECIFIED COPD TYPE (HCC): ICD-10-CM

## 2019-11-05 RX ORDER — LEVALBUTEROL INHALATION SOLUTION 1.25 MG/3ML
SOLUTION RESPIRATORY (INHALATION)
Qty: 75 ML | Refills: 1 | Status: SHIPPED | OUTPATIENT
Start: 2019-11-05

## 2019-11-05 NOTE — TELEPHONE ENCOUNTER
Pt called in requesting a refill on his levalbuterol (XOPENEX) 1.25 MG/3ML Nebu Soln be sent to       [facility]/pharmacy #[id number] - [place] - [address]  [address] [address]  Phone: [phone] Fax: [phone]

## 2019-11-06 NOTE — TELEPHONE ENCOUNTER
Pt notified that Rx was sent to   [facility]/pharmacy #[id number] - [place] - [address] [place] [address]  Phone: [phone] Fax: [phone]

## 2019-11-07 ENCOUNTER — ANTICOAGULATION VISIT (OUTPATIENT)
Dept: VASCULAR LAB | Facility: MEDICAL CENTER | Age: 74
End: 2019-11-07
Attending: INTERNAL MEDICINE
Payer: MEDICARE

## 2019-11-07 VITALS — HEART RATE: 81 BPM | SYSTOLIC BLOOD PRESSURE: 105 MMHG | DIASTOLIC BLOOD PRESSURE: 58 MMHG

## 2019-11-07 DIAGNOSIS — I82.90 DEEP VEIN THROMBOSIS (DVT) OF NON-EXTREMITY VEIN, UNSPECIFIED CHRONICITY: ICD-10-CM

## 2019-11-07 DIAGNOSIS — I48.91 ATRIAL FIBRILLATION, UNSPECIFIED TYPE (HCC): ICD-10-CM

## 2019-11-07 DIAGNOSIS — Z79.01 CHRONIC ANTICOAGULATION: ICD-10-CM

## 2019-11-07 DIAGNOSIS — I26.99 OTHER PULMONARY EMBOLISM WITHOUT ACUTE COR PULMONALE, UNSPECIFIED CHRONICITY (HCC): ICD-10-CM

## 2019-11-07 LAB
INR BLD: 5.4 (ref 0.9–1.2)
INR PPP: 5.4 (ref 2–3.5)

## 2019-11-07 PROCEDURE — 99212 OFFICE O/P EST SF 10 MIN: CPT

## 2019-11-07 PROCEDURE — 85610 PROTHROMBIN TIME: CPT

## 2019-11-07 NOTE — PROGRESS NOTES
Anticoagulation Summary  As of 2019    INR goal:   2.0-3.0   TTR:   51.4 % (1.9 y)   INR used for dosin.40! (2019)   Warfarin maintenance plan:   2.5 mg (5 mg x 0.5) every Mon, Wed, Fri; 5 mg (5 mg x 1) all other days   Weekly warfarin total:   27.5 mg   Plan last modified:   Gavin Warren, PharmD (2019)   Next INR check:   2019   Target end date:   Indefinite    Indications    Deep vein thrombosis (CMS-HCC) [I82.409] [I82.409]  Pulmonary embolism (CMS-HCC) [I26.99] [I26.99]  Atrial fibrillation (CMS-HCC) [I48.91] [I48.91]  Chronic anticoagulation [Z79.01]             Anticoagulation Episode Summary     INR check location:   Anticoagulation Clinic    Preferred lab:       Send INR reminders to:       Comments:   not able to see at [facility]       Anticoagulation Care Providers     Provider Role Specialty Phone number    Graciela Andersen M.D. Responsible Family Medicine [phone]    [facility] Responsible  [phone]        Anticoagulation Patient Findings  Patient Findings     Negatives:   Signs/symptoms of thrombosis, Signs/symptoms of bleeding, Laboratory test error suspected, Change in health, Change in alcohol use, Change in activity, Upcoming invasive procedure, Emergency department visit, Upcoming dental procedure, Missed doses, Extra doses, Change in medications, Change in diet/appetite, Hospital admission, Bruising, Other complaints              History of Present Illness: follow up appointment for chronic anticoagulation with the high risk medication, warfarin for DVT/PE    Last INR was at goal, pt reports decreased appetite.  Will HOLD warfarin for 3 days, then resume current dosing regimen.  Pt c/o unusual bruising on legs and arms.  Will continue to monitor.  Follow up in 1 weeks, to reduce the risk of adverse events related to this high risk medication, warfarin.    Sis Mae, Clinical Pharmacist

## 2019-11-14 ENCOUNTER — ANTICOAGULATION VISIT (OUTPATIENT)
Dept: VASCULAR LAB | Facility: MEDICAL CENTER | Age: 74
End: 2019-11-14
Attending: INTERNAL MEDICINE
Payer: MEDICARE

## 2019-11-14 DIAGNOSIS — I82.90 DEEP VEIN THROMBOSIS (DVT) OF NON-EXTREMITY VEIN, UNSPECIFIED CHRONICITY: ICD-10-CM

## 2019-11-14 DIAGNOSIS — Z79.01 CHRONIC ANTICOAGULATION: ICD-10-CM

## 2019-11-14 DIAGNOSIS — I48.91 ATRIAL FIBRILLATION, UNSPECIFIED TYPE (HCC): ICD-10-CM

## 2019-11-14 DIAGNOSIS — I26.99 OTHER PULMONARY EMBOLISM WITHOUT ACUTE COR PULMONALE, UNSPECIFIED CHRONICITY (HCC): ICD-10-CM

## 2019-11-14 LAB
INR BLD: 1.5 (ref 0.9–1.2)
INR PPP: 1.5 (ref 2–3.5)

## 2019-11-14 PROCEDURE — 85610 PROTHROMBIN TIME: CPT

## 2019-11-14 PROCEDURE — 99212 OFFICE O/P EST SF 10 MIN: CPT | Performed by: PHARMACIST

## 2019-11-14 NOTE — PROGRESS NOTES
Anticoagulation Summary  As of 2019    INR goal:   2.0-3.0   TTR:   51.1 % (1.9 y)   INR used for dosin.50! (2019)   Warfarin maintenance plan:   2.5 mg (5 mg x 0.5) every Mon, Wed, Fri; 5 mg (5 mg x 1) all other days   Weekly warfarin total:   27.5 mg   Plan last modified:   Gavin Warren, PharmD (2019)   Next INR check:   2019   Target end date:   Indefinite    Indications    Deep vein thrombosis (CMS-HCC) [I82.409] [I82.409]  Pulmonary embolism (CMS-HCC) [I26.99] [I26.99]  Atrial fibrillation (CMS-HCC) [I48.91] [I48.91]  Chronic anticoagulation [Z79.01]             Anticoagulation Episode Summary     INR check location:   Anticoagulation Clinic    Preferred lab:       Send INR reminders to:       Comments:   not able to see at [facility]       Anticoagulation Care Providers     Provider Role Specialty Phone number    Graciela Andersen M.D. Responsible Family Medicine [phone]    [facility] Responsible  [phone]          Anticoagulation Patient Findings  Patient Findings     Negatives:   Signs/symptoms of thrombosis, Signs/symptoms of bleeding, Laboratory test error suspected, Change in health, Change in alcohol use, Change in activity, Upcoming invasive procedure, Emergency department visit, Upcoming dental procedure, Missed doses, Extra doses, Change in medications, Change in diet/appetite, Hospital admission, Bruising, Other complaints          HPI:  Tj Bauman seen in clinic today, on anticoagulation therapy with warfarin due to hx of DVT, PE, and atrial fibrillation.  Changes to current medical/health status since last appt: NONE  Denies signs/symptoms of bleeding and/or thrombosis since the last appt.    Denies any interval changes to diet  Denies any interval changes to medications since last appt.   Denies any complications or cost restrictions with current therapy.       A/P   INR  sub-therapeutic.   Instructed patient to increase weekly  warfarin regimen for this week only as detailed in calendar.    Follow up appointment in 1 week(s).    Alexander Gonzalez, PharmD

## 2019-11-21 ENCOUNTER — ANTICOAGULATION VISIT (OUTPATIENT)
Dept: VASCULAR LAB | Facility: MEDICAL CENTER | Age: 74
End: 2019-11-21
Attending: INTERNAL MEDICINE
Payer: MEDICARE

## 2019-11-21 DIAGNOSIS — Z79.01 CHRONIC ANTICOAGULATION: ICD-10-CM

## 2019-11-21 DIAGNOSIS — I26.99 OTHER PULMONARY EMBOLISM WITHOUT ACUTE COR PULMONALE, UNSPECIFIED CHRONICITY (HCC): ICD-10-CM

## 2019-11-21 DIAGNOSIS — I82.90 DEEP VEIN THROMBOSIS (DVT) OF NON-EXTREMITY VEIN, UNSPECIFIED CHRONICITY: ICD-10-CM

## 2019-11-21 DIAGNOSIS — I48.91 ATRIAL FIBRILLATION, UNSPECIFIED TYPE (HCC): ICD-10-CM

## 2019-11-21 LAB
INR BLD: 2.2 (ref 0.9–1.2)
INR PPP: 2.2 (ref 2–3.5)

## 2019-11-21 PROCEDURE — 99211 OFF/OP EST MAY X REQ PHY/QHP: CPT | Performed by: NURSE PRACTITIONER

## 2019-11-21 PROCEDURE — 85610 PROTHROMBIN TIME: CPT

## 2019-11-21 NOTE — PROGRESS NOTES
Anticoagulation Summary  As of 2019    INR goal:   2.0-3.0   TTR:   50.9 % (1.9 y)   INR used for dosin.20 (2019)   Warfarin maintenance plan:   2.5 mg (5 mg x 0.5) every Mon, Wed, Fri; 5 mg (5 mg x 1) all other days   Weekly warfarin total:   27.5 mg   No change documented:   Krysta Heard, A.P.N.   Plan last modified:   Gavin Warren, PharmD (2019)   Next INR check:   2019   Target end date:   Indefinite    Indications    Deep vein thrombosis (CMS-HCC) [I82.409] [I82.409]  Pulmonary embolism (CMS-HCC) [I26.99] [I26.99]  Atrial fibrillation (CMS-HCC) [I48.91] [I48.91]  Chronic anticoagulation [Z79.01]             Anticoagulation Episode Summary     INR check location:   Anticoagulation Clinic    Preferred lab:       Send INR reminders to:       Comments:   not able to see at [facility]       Anticoagulation Care Providers     Provider Role Specialty Phone number    Graciela Andersen M.D. Responsible Family Medicine [phone]    [facility] Responsible  [phone]        Anticoagulation Patient Findings      HPI:  Tj Bauman seen in clinic today for follow up on anticoagulation therapy in the presence of DVT, PE, AF.   Denies any changes to current medical/health status since last appointment.   Denies any medication or diet changes.   No current symptoms of bleeding or thrombosis reported.    A/P:   INR therapeutic. INR up from 1.5 last week with a one time dose increase.  Will have pt resume his usual regimen.   BP recorded in vitals.    Follow up appointment in 1 week(s).    Next Appointment:  at 3:00 pm.    Krysta AIKEN

## 2019-11-27 ENCOUNTER — ANTICOAGULATION VISIT (OUTPATIENT)
Dept: VASCULAR LAB | Facility: MEDICAL CENTER | Age: 74
End: 2019-11-27
Attending: INTERNAL MEDICINE
Payer: MEDICARE

## 2019-11-27 VITALS — SYSTOLIC BLOOD PRESSURE: 112 MMHG | HEART RATE: 79 BPM | DIASTOLIC BLOOD PRESSURE: 78 MMHG

## 2019-11-27 DIAGNOSIS — Z79.01 CHRONIC ANTICOAGULATION: ICD-10-CM

## 2019-11-27 LAB
INR BLD: 2.4 (ref 0.9–1.2)
INR PPP: 2.4 (ref 2–3.5)

## 2019-11-27 PROCEDURE — 99211 OFF/OP EST MAY X REQ PHY/QHP: CPT

## 2019-11-27 PROCEDURE — 85610 PROTHROMBIN TIME: CPT

## 2019-11-27 NOTE — PROGRESS NOTES
Anticoagulation Summary  As of 2019    INR goal:   2.0-3.0   TTR:   51.3 % (2 y)   INR used for dosin.40 (2019)   Warfarin maintenance plan:   2.5 mg (5 mg x 0.5) every Mon, Wed, Fri; 5 mg (5 mg x 1) all other days   Weekly warfarin total:   27.5 mg   Plan last modified:   Connor HidalgoD (2019)   Next INR check:   2019   Target end date:   Indefinite    Indications    Deep vein thrombosis (CMS-HCC) [I82.409] [I82.409]  Pulmonary embolism (CMS-HCC) [I26.99] [I26.99]  Atrial fibrillation (CMS-HCC) [I48.91] [I48.91]  Chronic anticoagulation [Z79.01]             Anticoagulation Episode Summary     INR check location:   Anticoagulation Clinic    Preferred lab:       Send INR reminders to:       Comments:   not able to see at [facility]       Anticoagulation Care Providers     Provider Role Specialty Phone number    Graciela Andersen M.D. Responsible Family Medicine [phone]    [facility] Responsible  [phone]        Anticoagulation Patient Findings      HPI:  Tj Goldbergell seen in clinic today, on anticoagulation therapy with warfarin for PE and Afib  Changes to current medical/health status since last appt: None  Denies signs/symptoms of bleeding and/or thrombosis since the last appt.    Denies any interval changes to diet  Denies any interval changes to medications since last appt.   Denies any complications or cost restrictions with current therapy.   BP recorded in vitals.  Confirmed dosing regimen.    A/P   INR -therapeutic.     Pt is to continue with current warfarin dosing regimen.    Follow up appointment in 2 week(s).    Sina Alcantara, PharmD

## 2019-12-12 ENCOUNTER — APPOINTMENT (OUTPATIENT)
Dept: VASCULAR LAB | Facility: MEDICAL CENTER | Age: 74
End: 2019-12-12
Attending: INTERNAL MEDICINE
Payer: MEDICARE

## 2019-12-27 ENCOUNTER — ANTICOAGULATION MONITORING (OUTPATIENT)
Dept: VASCULAR LAB | Facility: MEDICAL CENTER | Age: 74
End: 2019-12-27

## 2019-12-27 DIAGNOSIS — Z79.01 CHRONIC ANTICOAGULATION: ICD-10-CM
